# Patient Record
Sex: MALE | Race: WHITE | NOT HISPANIC OR LATINO | Employment: OTHER | URBAN - METROPOLITAN AREA
[De-identification: names, ages, dates, MRNs, and addresses within clinical notes are randomized per-mention and may not be internally consistent; named-entity substitution may affect disease eponyms.]

---

## 2023-02-20 LAB
ANION GAP IN SER/PLAS: 14 MMOL/L (ref 10–20)
BASOPHILS (10*3/UL) IN BLOOD BY AUTOMATED COUNT: 0.04 X10E9/L (ref 0–0.1)
BASOPHILS/100 LEUKOCYTES IN BLOOD BY AUTOMATED COUNT: 0.4 % (ref 0–2)
CALCIUM (MG/DL) IN SER/PLAS: 9.5 MG/DL (ref 8.6–10.3)
CARBON DIOXIDE, TOTAL (MMOL/L) IN SER/PLAS: 29 MMOL/L (ref 21–32)
CHLORIDE (MMOL/L) IN SER/PLAS: 104 MMOL/L (ref 98–107)
CREATININE (MG/DL) IN SER/PLAS: 1.17 MG/DL (ref 0.5–1.3)
EOSINOPHILS (10*3/UL) IN BLOOD BY AUTOMATED COUNT: 0.11 X10E9/L (ref 0–0.7)
EOSINOPHILS/100 LEUKOCYTES IN BLOOD BY AUTOMATED COUNT: 1.2 % (ref 0–6)
ERYTHROCYTE DISTRIBUTION WIDTH (RATIO) BY AUTOMATED COUNT: 12.5 % (ref 11.5–14.5)
ERYTHROCYTE MEAN CORPUSCULAR HEMOGLOBIN CONCENTRATION (G/DL) BY AUTOMATED: 32.6 G/DL (ref 32–36)
ERYTHROCYTE MEAN CORPUSCULAR VOLUME (FL) BY AUTOMATED COUNT: 98 FL (ref 80–100)
ERYTHROCYTES (10*6/UL) IN BLOOD BY AUTOMATED COUNT: 5.91 X10E12/L (ref 4.5–5.9)
GFR MALE: 68 ML/MIN/1.73M2
GLUCOSE (MG/DL) IN SER/PLAS: 81 MG/DL (ref 74–99)
HEMATOCRIT (%) IN BLOOD BY AUTOMATED COUNT: 57.9 % (ref 41–52)
HEMOGLOBIN (G/DL) IN BLOOD: 18.9 G/DL (ref 13.5–17.5)
IMMATURE GRANULOCYTES/100 LEUKOCYTES IN BLOOD BY AUTOMATED COUNT: 0.4 % (ref 0–0.9)
LEUKOCYTES (10*3/UL) IN BLOOD BY AUTOMATED COUNT: 9 X10E9/L (ref 4.4–11.3)
LYMPHOCYTES (10*3/UL) IN BLOOD BY AUTOMATED COUNT: 1.59 X10E9/L (ref 1.2–4.8)
LYMPHOCYTES/100 LEUKOCYTES IN BLOOD BY AUTOMATED COUNT: 17.7 % (ref 13–44)
MONOCYTES (10*3/UL) IN BLOOD BY AUTOMATED COUNT: 0.82 X10E9/L (ref 0.1–1)
MONOCYTES/100 LEUKOCYTES IN BLOOD BY AUTOMATED COUNT: 9.1 % (ref 2–10)
NEUTROPHILS (10*3/UL) IN BLOOD BY AUTOMATED COUNT: 6.39 X10E9/L (ref 1.2–7.7)
NEUTROPHILS/100 LEUKOCYTES IN BLOOD BY AUTOMATED COUNT: 71.2 % (ref 40–80)
PLATELETS (10*3/UL) IN BLOOD AUTOMATED COUNT: 210 X10E9/L (ref 150–450)
POTASSIUM (MMOL/L) IN SER/PLAS: 5 MMOL/L (ref 3.5–5.3)
SODIUM (MMOL/L) IN SER/PLAS: 142 MMOL/L (ref 136–145)
UREA NITROGEN (MG/DL) IN SER/PLAS: 21 MG/DL (ref 6–23)

## 2023-02-21 LAB — URINE CULTURE: NO GROWTH

## 2023-04-05 DIAGNOSIS — N52.8 OTHER MALE ERECTILE DYSFUNCTION: Primary | ICD-10-CM

## 2023-04-05 PROBLEM — G62.9 PERIPHERAL NEUROPATHY: Status: ACTIVE | Noted: 2023-04-05

## 2023-04-05 PROBLEM — R39.89 SUSPECTED UTI: Status: RESOLVED | Noted: 2023-04-05 | Resolved: 2023-04-05

## 2023-04-05 PROBLEM — J44.9 COPD (CHRONIC OBSTRUCTIVE PULMONARY DISEASE) (MULTI): Status: ACTIVE | Noted: 2023-04-05

## 2023-04-05 PROBLEM — D23.62: Status: RESOLVED | Noted: 2023-04-05 | Resolved: 2023-04-05

## 2023-04-05 PROBLEM — H60.502 ACUTE OTITIS EXTERNA OF LEFT EAR: Status: RESOLVED | Noted: 2023-04-05 | Resolved: 2023-04-05

## 2023-04-05 PROBLEM — E78.5 HYPERLIPIDEMIA: Status: ACTIVE | Noted: 2023-04-05

## 2023-04-05 PROBLEM — N45.1 EPIDIDYMITIS: Status: ACTIVE | Noted: 2023-04-05

## 2023-04-05 PROBLEM — R90.89 ABNORMAL FINDING ON MRI OF BRAIN: Status: ACTIVE | Noted: 2023-04-05

## 2023-04-05 PROBLEM — R26.2 DIFFICULTY WALKING: Status: ACTIVE | Noted: 2023-04-05

## 2023-04-05 PROBLEM — M17.10 PRIMARY LOCALIZED OSTEOARTHROSIS, LOWER LEG: Status: ACTIVE | Noted: 2023-04-05

## 2023-04-05 PROBLEM — R73.01 FASTING HYPERGLYCEMIA: Status: RESOLVED | Noted: 2023-04-05 | Resolved: 2023-04-05

## 2023-04-05 PROBLEM — H52.4 HYPEROPIA OF BOTH EYES WITH ASTIGMATISM AND PRESBYOPIA: Status: ACTIVE | Noted: 2023-04-05

## 2023-04-05 PROBLEM — N20.0 CALCULUS OF KIDNEY: Status: ACTIVE | Noted: 2023-04-05

## 2023-04-05 PROBLEM — N32.89 BLADDER MASS: Status: ACTIVE | Noted: 2023-04-05

## 2023-04-05 PROBLEM — H52.4 HYPEROPIA WITH ASTIGMATISM AND PRESBYOPIA: Status: ACTIVE | Noted: 2023-04-05

## 2023-04-05 PROBLEM — N52.9 ERECTILE DYSFUNCTION: Status: ACTIVE | Noted: 2023-04-05

## 2023-04-05 PROBLEM — H90.8 HEARING LOSS, MIXED, UNILATERAL: Status: ACTIVE | Noted: 2023-04-05

## 2023-04-05 PROBLEM — E66.9 OBESITY: Status: ACTIVE | Noted: 2023-04-05

## 2023-04-05 PROBLEM — M25.551 HIP PAIN, RIGHT: Status: RESOLVED | Noted: 2023-04-05 | Resolved: 2023-04-05

## 2023-04-05 PROBLEM — H52.203 HYPEROPIA OF BOTH EYES WITH ASTIGMATISM AND PRESBYOPIA: Status: ACTIVE | Noted: 2023-04-05

## 2023-04-05 PROBLEM — Z96.0 STATUS POST CYSTOSCOPY WITH URETERAL STENT PLACEMENT: Status: ACTIVE | Noted: 2023-04-05

## 2023-04-05 PROBLEM — R05.9 COUGH: Status: RESOLVED | Noted: 2023-04-05 | Resolved: 2023-04-05

## 2023-04-05 PROBLEM — E78.6 LOW HDL (UNDER 40): Status: ACTIVE | Noted: 2023-04-05

## 2023-04-05 PROBLEM — R06.00 DYSPNEA: Status: ACTIVE | Noted: 2023-04-05

## 2023-04-05 PROBLEM — I10 BENIGN ESSENTIAL HYPERTENSION: Status: ACTIVE | Noted: 2023-04-05

## 2023-04-05 PROBLEM — R20.2 PARESTHESIA: Status: ACTIVE | Noted: 2023-04-05

## 2023-04-05 PROBLEM — D75.1 ERYTHROCYTOSIS: Status: ACTIVE | Noted: 2023-04-05

## 2023-04-05 PROBLEM — G44.009 CLUSTER HEADACHE: Status: RESOLVED | Noted: 2023-04-05 | Resolved: 2023-04-05

## 2023-04-05 PROBLEM — M54.12 CERVICAL RADICULITIS: Status: ACTIVE | Noted: 2023-04-05

## 2023-04-05 PROBLEM — H11.121 CONJUNCTIVAL CONCRETIONS OF RIGHT EYE: Status: ACTIVE | Noted: 2023-04-05

## 2023-04-05 PROBLEM — C67.9 BLADDER CANCER (MULTI): Status: ACTIVE | Noted: 2023-04-05

## 2023-04-05 PROBLEM — M25.661 STIFFNESS OF RIGHT KNEE, NOT ELSEWHERE CLASSIFIED: Status: RESOLVED | Noted: 2023-04-05 | Resolved: 2023-04-05

## 2023-04-05 PROBLEM — E11.9 DIABETES MELLITUS TYPE 2 WITHOUT RETINOPATHY (MULTI): Status: ACTIVE | Noted: 2023-04-05

## 2023-04-05 PROBLEM — H52.03 HYPEROPIA OF BOTH EYES WITH ASTIGMATISM AND PRESBYOPIA: Status: ACTIVE | Noted: 2023-04-05

## 2023-04-05 PROBLEM — R22.32 MASS OF FINGER OF LEFT HAND: Status: ACTIVE | Noted: 2023-04-05

## 2023-04-05 PROBLEM — H52.209 HYPEROPIA WITH ASTIGMATISM AND PRESBYOPIA: Status: ACTIVE | Noted: 2023-04-05

## 2023-04-05 PROBLEM — H35.413 LATTICE DEGENERATION OF BOTH RETINAS: Status: RESOLVED | Noted: 2023-04-05 | Resolved: 2023-04-05

## 2023-04-05 PROBLEM — E11.9 DIABETES MELLITUS (MULTI): Status: RESOLVED | Noted: 2023-04-05 | Resolved: 2023-04-05

## 2023-04-05 PROBLEM — H52.00 HYPEROPIA WITH ASTIGMATISM AND PRESBYOPIA: Status: ACTIVE | Noted: 2023-04-05

## 2023-04-05 PROBLEM — R53.83 FATIGUE: Status: ACTIVE | Noted: 2023-04-05

## 2023-04-05 PROBLEM — H66.90 ACUTE OTITIS MEDIA: Status: RESOLVED | Noted: 2023-04-05 | Resolved: 2023-04-05

## 2023-04-05 PROBLEM — C34.90 MALIGNANT NEOPLASM OF LUNG (MULTI): Status: ACTIVE | Noted: 2023-04-05

## 2023-04-05 PROBLEM — J20.9 ACUTE BRONCHITIS WITH BRONCHOSPASM: Status: RESOLVED | Noted: 2023-04-05 | Resolved: 2023-04-05

## 2023-04-05 PROBLEM — Z85.118 HISTORY OF LUNG CANCER: Status: ACTIVE | Noted: 2023-04-05

## 2023-04-05 PROBLEM — H65.90 SEROUS OTITIS MEDIA: Status: RESOLVED | Noted: 2023-04-05 | Resolved: 2023-04-05

## 2023-04-05 PROBLEM — E55.9 VITAMIN D DEFICIENCY: Status: ACTIVE | Noted: 2023-04-05

## 2023-04-05 PROBLEM — D22.9 MULTIPLE NEVI: Status: ACTIVE | Noted: 2023-04-05

## 2023-04-05 PROBLEM — H69.93 DYSFUNCTION OF BOTH EUSTACHIAN TUBES: Status: ACTIVE | Noted: 2023-04-05

## 2023-04-05 PROBLEM — H25.13 NUCLEAR SCLEROSIS OF BOTH EYES: Status: ACTIVE | Noted: 2023-04-05

## 2023-04-05 RX ORDER — LISINOPRIL 10 MG/1
1 TABLET ORAL DAILY
COMMUNITY
Start: 2022-05-12

## 2023-04-05 RX ORDER — ERGOCALCIFEROL 1.25 MG/1
1.25 CAPSULE ORAL
COMMUNITY
Start: 2020-05-14 | End: 2023-06-19

## 2023-04-05 RX ORDER — MELOXICAM 15 MG/1
15 TABLET ORAL DAILY PRN
COMMUNITY
End: 2023-06-07

## 2023-04-05 RX ORDER — DOCUSATE SODIUM 100 MG/1
100 CAPSULE, LIQUID FILLED ORAL 2 TIMES DAILY
COMMUNITY
Start: 2022-05-23

## 2023-04-05 RX ORDER — METFORMIN HYDROCHLORIDE 500 MG/1
2 TABLET, EXTENDED RELEASE ORAL 2 TIMES DAILY
COMMUNITY
Start: 2020-06-16 | End: 2023-04-13

## 2023-04-05 RX ORDER — TADALAFIL 20 MG/1
20 TABLET ORAL AS NEEDED
COMMUNITY
Start: 2019-12-05 | End: 2023-08-25

## 2023-04-05 RX ORDER — GLYBURIDE 5 MG/1
1 TABLET ORAL DAILY
COMMUNITY
Start: 2020-05-14

## 2023-04-05 RX ORDER — TADALAFIL 20 MG/1
TABLET ORAL
Qty: 20 TABLET | Refills: 1 | Status: SHIPPED | OUTPATIENT
Start: 2023-04-05 | End: 2023-06-08

## 2023-04-05 RX ORDER — ALBUTEROL SULFATE 90 UG/1
2 AEROSOL, METERED RESPIRATORY (INHALATION)
COMMUNITY
End: 2023-11-16

## 2023-04-05 RX ORDER — BACILLUS CALMETTE-GUERIN 50 MG/50ML
50 POWDER, FOR SUSPENSION INTRAVESICAL
COMMUNITY
Start: 2022-08-23 | End: 2023-10-19 | Stop reason: ALTCHOICE

## 2023-04-05 RX ORDER — TRAMADOL HYDROCHLORIDE 50 MG/1
50 TABLET ORAL
COMMUNITY
Start: 2022-05-23

## 2023-04-05 RX ORDER — LOSARTAN POTASSIUM 25 MG/1
1 TABLET ORAL DAILY
COMMUNITY
Start: 2022-05-25 | End: 2023-04-11

## 2023-04-05 RX ORDER — PIOGLITAZONEHYDROCHLORIDE 30 MG/1
1 TABLET ORAL DAILY
COMMUNITY
Start: 2020-05-14 | End: 2023-04-13

## 2023-04-11 DIAGNOSIS — I10 ESSENTIAL (PRIMARY) HYPERTENSION: ICD-10-CM

## 2023-04-11 RX ORDER — LOSARTAN POTASSIUM 25 MG/1
TABLET ORAL
Qty: 100 TABLET | Refills: 2 | Status: SHIPPED | OUTPATIENT
Start: 2023-04-11 | End: 2024-02-08

## 2023-04-13 DIAGNOSIS — E11.9 TYPE 2 DIABETES MELLITUS WITHOUT COMPLICATIONS (MULTI): Primary | ICD-10-CM

## 2023-04-13 RX ORDER — METFORMIN HYDROCHLORIDE 500 MG/1
TABLET, EXTENDED RELEASE ORAL
Qty: 360 TABLET | Refills: 3 | Status: SHIPPED | OUTPATIENT
Start: 2023-04-13 | End: 2024-05-02

## 2023-04-13 RX ORDER — PIOGLITAZONEHYDROCHLORIDE 30 MG/1
TABLET ORAL
Qty: 90 TABLET | Refills: 3 | Status: SHIPPED | OUTPATIENT
Start: 2023-04-13 | End: 2023-07-19

## 2023-05-29 DIAGNOSIS — M17.12 UNILATERAL PRIMARY OSTEOARTHRITIS, LEFT KNEE: ICD-10-CM

## 2023-05-30 DIAGNOSIS — N52.8 OTHER MALE ERECTILE DYSFUNCTION: ICD-10-CM

## 2023-06-07 RX ORDER — MELOXICAM 15 MG/1
TABLET ORAL
Qty: 30 TABLET | Refills: 3 | Status: SHIPPED | OUTPATIENT
Start: 2023-06-07 | End: 2023-10-03

## 2023-06-08 RX ORDER — TADALAFIL 20 MG/1
TABLET ORAL
Qty: 20 TABLET | Refills: 1 | Status: SHIPPED | OUTPATIENT
Start: 2023-06-08 | End: 2023-06-22 | Stop reason: SDUPTHER

## 2023-06-17 DIAGNOSIS — E55.9 VITAMIN D DEFICIENCY, UNSPECIFIED: ICD-10-CM

## 2023-06-19 RX ORDER — ERGOCALCIFEROL 1.25 MG/1
CAPSULE ORAL
Qty: 12 CAPSULE | Refills: 2 | Status: SHIPPED | OUTPATIENT
Start: 2023-06-19 | End: 2024-02-05

## 2023-06-22 DIAGNOSIS — N52.8 OTHER MALE ERECTILE DYSFUNCTION: ICD-10-CM

## 2023-06-22 RX ORDER — TADALAFIL 20 MG/1
TABLET ORAL
Qty: 20 TABLET | Refills: 1 | Status: SHIPPED | OUTPATIENT
Start: 2023-06-22 | End: 2023-07-19 | Stop reason: SDUPTHER

## 2023-06-26 ENCOUNTER — HOSPITAL ENCOUNTER (OUTPATIENT)
Dept: DATA CONVERSION | Facility: HOSPITAL | Age: 68
End: 2023-06-26
Attending: ANESTHESIOLOGY
Payer: MEDICARE

## 2023-06-26 DIAGNOSIS — M54.50 LOW BACK PAIN, UNSPECIFIED: ICD-10-CM

## 2023-06-26 DIAGNOSIS — M54.16 RADICULOPATHY, LUMBAR REGION: ICD-10-CM

## 2023-07-07 LAB
ALBUMIN (G/DL) IN SER/PLAS: 4.2 G/DL (ref 3.4–5)
ANION GAP IN SER/PLAS: 9 MMOL/L (ref 10–20)
APPEARANCE, URINE: ABNORMAL
BILIRUBIN, URINE: NEGATIVE
BLOOD, URINE: NEGATIVE
CALCIUM (MG/DL) IN SER/PLAS: 9.7 MG/DL (ref 8.6–10.6)
CARBON DIOXIDE, TOTAL (MMOL/L) IN SER/PLAS: 32 MMOL/L (ref 21–32)
CHLORIDE (MMOL/L) IN SER/PLAS: 107 MMOL/L (ref 98–107)
COLOR, URINE: ABNORMAL
CREATININE (MG/DL) IN SER/PLAS: 1.16 MG/DL (ref 0.5–1.3)
ERYTHROCYTE DISTRIBUTION WIDTH (RATIO) BY AUTOMATED COUNT: 12.3 % (ref 11.5–14.5)
ERYTHROCYTE MEAN CORPUSCULAR HEMOGLOBIN CONCENTRATION (G/DL) BY AUTOMATED: 31.4 G/DL (ref 32–36)
ERYTHROCYTE MEAN CORPUSCULAR VOLUME (FL) BY AUTOMATED COUNT: 102 FL (ref 80–100)
ERYTHROCYTES (10*6/UL) IN BLOOD BY AUTOMATED COUNT: 5.86 X10E12/L (ref 4.5–5.9)
GFR MALE: 68 ML/MIN/1.73M2
GLUCOSE (MG/DL) IN SER/PLAS: 110 MG/DL (ref 74–99)
GLUCOSE, URINE: NEGATIVE MG/DL
HEMATOCRIT (%) IN BLOOD BY AUTOMATED COUNT: 59.6 % (ref 41–52)
HEMOGLOBIN (G/DL) IN BLOOD: 18.7 G/DL (ref 13.5–17.5)
KETONES, URINE: NEGATIVE MG/DL
LEUKOCYTE ESTERASE, URINE: NEGATIVE
LEUKOCYTES (10*3/UL) IN BLOOD BY AUTOMATED COUNT: 6.8 X10E9/L (ref 4.4–11.3)
MUCUS, URINE: NORMAL /LPF
NITRITE, URINE: NEGATIVE
NRBC (PER 100 WBCS) BY AUTOMATED COUNT: 0 /100 WBC (ref 0–0)
PH, URINE: 5 (ref 5–8)
PHOSPHATE (MG/DL) IN SER/PLAS: 3.9 MG/DL (ref 2.5–4.9)
PLATELETS (10*3/UL) IN BLOOD AUTOMATED COUNT: 211 X10E9/L (ref 150–450)
POTASSIUM (MMOL/L) IN SER/PLAS: 5.2 MMOL/L (ref 3.5–5.3)
PROTEIN, URINE: ABNORMAL MG/DL
RBC, URINE: NORMAL /HPF (ref 0–5)
SODIUM (MMOL/L) IN SER/PLAS: 143 MMOL/L (ref 136–145)
SPECIFIC GRAVITY, URINE: 1.02 (ref 1–1.03)
UREA NITROGEN (MG/DL) IN SER/PLAS: 26 MG/DL (ref 6–23)
UROBILINOGEN, URINE: <2 MG/DL (ref 0–1.9)
WBC, URINE: NORMAL /HPF (ref 0–5)

## 2023-07-08 LAB — URINE CULTURE: NO GROWTH

## 2023-07-12 ENCOUNTER — APPOINTMENT (OUTPATIENT)
Dept: PRIMARY CARE | Facility: CLINIC | Age: 68
End: 2023-07-12
Payer: MEDICARE

## 2023-07-19 ENCOUNTER — OFFICE VISIT (OUTPATIENT)
Dept: PRIMARY CARE | Facility: CLINIC | Age: 68
End: 2023-07-19
Payer: MEDICARE

## 2023-07-19 VITALS
RESPIRATION RATE: 16 BRPM | SYSTOLIC BLOOD PRESSURE: 124 MMHG | HEIGHT: 70 IN | BODY MASS INDEX: 32.93 KG/M2 | WEIGHT: 230 LBS | HEART RATE: 72 BPM | DIASTOLIC BLOOD PRESSURE: 76 MMHG

## 2023-07-19 DIAGNOSIS — Z96.0 STATUS POST CYSTOSCOPY WITH URETERAL STENT PLACEMENT: ICD-10-CM

## 2023-07-19 DIAGNOSIS — E78.5 HYPERLIPIDEMIA, UNSPECIFIED HYPERLIPIDEMIA TYPE: ICD-10-CM

## 2023-07-19 DIAGNOSIS — C34.90 MALIGNANT NEOPLASM OF LUNG, UNSPECIFIED LATERALITY, UNSPECIFIED PART OF LUNG (MULTI): ICD-10-CM

## 2023-07-19 DIAGNOSIS — Z85.118 HISTORY OF LUNG CANCER: ICD-10-CM

## 2023-07-19 DIAGNOSIS — R06.09 DYSPNEA ON EXERTION: ICD-10-CM

## 2023-07-19 DIAGNOSIS — G62.89 OTHER POLYNEUROPATHY: ICD-10-CM

## 2023-07-19 DIAGNOSIS — M48.00 SPINAL STENOSIS, UNSPECIFIED SPINAL REGION: ICD-10-CM

## 2023-07-19 DIAGNOSIS — I10 BENIGN ESSENTIAL HYPERTENSION: ICD-10-CM

## 2023-07-19 DIAGNOSIS — C67.9 MALIGNANT NEOPLASM OF URINARY BLADDER, UNSPECIFIED SITE (MULTI): ICD-10-CM

## 2023-07-19 DIAGNOSIS — E11.9 DIABETES MELLITUS TYPE 2 WITHOUT RETINOPATHY (MULTI): ICD-10-CM

## 2023-07-19 DIAGNOSIS — R97.20 ELEVATED PSA: ICD-10-CM

## 2023-07-19 DIAGNOSIS — Z00.00 HEALTHCARE MAINTENANCE: Primary | ICD-10-CM

## 2023-07-19 DIAGNOSIS — E55.9 VITAMIN D DEFICIENCY: ICD-10-CM

## 2023-07-19 DIAGNOSIS — J42 CHRONIC BRONCHITIS, UNSPECIFIED CHRONIC BRONCHITIS TYPE (MULTI): ICD-10-CM

## 2023-07-19 DIAGNOSIS — E66.09 CLASS 1 OBESITY DUE TO EXCESS CALORIES WITHOUT SERIOUS COMORBIDITY WITH BODY MASS INDEX (BMI) OF 33.0 TO 33.9 IN ADULT: ICD-10-CM

## 2023-07-19 PROCEDURE — 1125F AMNT PAIN NOTED PAIN PRSNT: CPT | Performed by: INTERNAL MEDICINE

## 2023-07-19 PROCEDURE — 3008F BODY MASS INDEX DOCD: CPT | Performed by: INTERNAL MEDICINE

## 2023-07-19 PROCEDURE — 1036F TOBACCO NON-USER: CPT | Performed by: INTERNAL MEDICINE

## 2023-07-19 PROCEDURE — G0009 ADMIN PNEUMOCOCCAL VACCINE: HCPCS | Performed by: INTERNAL MEDICINE

## 2023-07-19 PROCEDURE — 3078F DIAST BP <80 MM HG: CPT | Performed by: INTERNAL MEDICINE

## 2023-07-19 PROCEDURE — 99214 OFFICE O/P EST MOD 30 MIN: CPT | Performed by: INTERNAL MEDICINE

## 2023-07-19 PROCEDURE — 1159F MED LIST DOCD IN RCRD: CPT | Performed by: INTERNAL MEDICINE

## 2023-07-19 PROCEDURE — 3044F HG A1C LEVEL LT 7.0%: CPT | Performed by: INTERNAL MEDICINE

## 2023-07-19 PROCEDURE — 4010F ACE/ARB THERAPY RXD/TAKEN: CPT | Performed by: INTERNAL MEDICINE

## 2023-07-19 PROCEDURE — 3074F SYST BP LT 130 MM HG: CPT | Performed by: INTERNAL MEDICINE

## 2023-07-19 PROCEDURE — 90677 PCV20 VACCINE IM: CPT | Performed by: INTERNAL MEDICINE

## 2023-07-19 PROCEDURE — G0439 PPPS, SUBSEQ VISIT: HCPCS | Performed by: INTERNAL MEDICINE

## 2023-07-19 PROCEDURE — 99397 PER PM REEVAL EST PAT 65+ YR: CPT | Performed by: INTERNAL MEDICINE

## 2023-07-19 PROCEDURE — 1160F RVW MEDS BY RX/DR IN RCRD: CPT | Performed by: INTERNAL MEDICINE

## 2023-07-19 PROCEDURE — 1170F FXNL STATUS ASSESSED: CPT | Performed by: INTERNAL MEDICINE

## 2023-07-19 RX ORDER — MELOXICAM 15 MG/1
1 TABLET ORAL DAILY PRN
COMMUNITY
Start: 2022-02-25 | End: 2023-10-19 | Stop reason: SDUPTHER

## 2023-07-19 RX ORDER — ATORVASTATIN CALCIUM 20 MG/1
20 TABLET, FILM COATED ORAL DAILY
COMMUNITY
End: 2023-11-15

## 2023-07-19 RX ORDER — INFLUENZA A VIRUS A/VICTORIA/2570/2019 IVR-215 (H1N1) ANTIGEN (FORMALDEHYDE INACTIVATED), INFLUENZA A VIRUS A/DARWIN/9/2021 SAN-010 (H3N2) ANTIGEN (FORMALDEHYDE INACTIVATED), INFLUENZA B VIRUS B/PHUKET/3073/2013 ANTIGEN (FORMALDEHYDE INACTIVATED), AND INFLUENZA B VIRUS B/MICHIGAN/01/2021 ANTIGEN (FORMALDEHYDE INACTIVATED) 60; 60; 60; 60 UG/.7ML; UG/.7ML; UG/.7ML; UG/.7ML
INJECTION, SUSPENSION INTRAMUSCULAR
COMMUNITY
Start: 2022-10-26 | End: 2023-10-19 | Stop reason: ALTCHOICE

## 2023-07-19 RX ORDER — LOSARTAN POTASSIUM 25 MG/1
1 TABLET ORAL DAILY
COMMUNITY
Start: 2022-05-25 | End: 2023-10-19 | Stop reason: SDUPTHER

## 2023-07-19 RX ORDER — UMECLIDINIUM BROMIDE AND VILANTEROL TRIFENATATE 62.5; 25 UG/1; UG/1
1 POWDER RESPIRATORY (INHALATION) DAILY
COMMUNITY
End: 2024-01-25 | Stop reason: WASHOUT

## 2023-07-19 RX ORDER — BNT162B2 ORIGINAL AND OMICRON BA.4/BA.5 .1125; .1125 MG/2.25ML; MG/2.25ML
INJECTION, SUSPENSION INTRAMUSCULAR
COMMUNITY
Start: 2022-10-26 | End: 2023-10-19 | Stop reason: ALTCHOICE

## 2023-07-19 NOTE — PATIENT INSTRUCTIONS
Overall you are in good health for your age and established medical problems  You have no clinical evidence for active heart disease but multiple risk factors  Normal cardiac stress test December 2015  Recommend resting echocardiogram to evaluate heart function given exertional dyspnea  Age and gender appropriate cancer screening (up-to-date noting therapy for prior lung cancer and bladder cancer  Immunizations consider update pneumonia shot, annual flu shot  Diabetes mellitus-check hemoglobin A1c; discontinue pioglitazone due to bladder cancer  Peripheral neuropathy in feet likely secondary to diabetes, idiopathic, or lumbar spine disease  Obesity associated lifetime risk diabetes, hypertension, heart disease, certain cancers, obstructive sleep apnea and degenerative arthritic joints  Consider sleep apnea test  Consider medication alternatives for weight loss and diabetes

## 2023-07-20 PROBLEM — N50.89 LUMP IN THE TESTICLE: Status: ACTIVE | Noted: 2023-07-20

## 2023-07-20 PROBLEM — R79.89 LOW TESTOSTERONE: Status: ACTIVE | Noted: 2023-07-20

## 2023-07-20 PROBLEM — R31.29 MICROSCOPIC HEMATURIA: Status: ACTIVE | Noted: 2023-07-20

## 2023-07-20 PROBLEM — Z86.010 PERSONAL HISTORY OF COLONIC POLYPS: Status: ACTIVE | Noted: 2023-07-20

## 2023-07-20 PROBLEM — R39.9 UTI SYMPTOMS: Status: RESOLVED | Noted: 2023-07-20 | Resolved: 2023-07-20

## 2023-07-20 PROBLEM — R39.12 WEAK URINARY STREAM: Status: ACTIVE | Noted: 2023-07-20

## 2023-07-20 PROBLEM — R91.1 LUNG NODULE: Status: ACTIVE | Noted: 2023-07-20

## 2023-07-20 PROBLEM — M48.00 SPINAL STENOSIS: Status: ACTIVE | Noted: 2023-07-20

## 2023-07-20 PROBLEM — Z86.0100 PERSONAL HISTORY OF COLONIC POLYPS: Status: ACTIVE | Noted: 2023-07-20

## 2023-07-20 RX ORDER — IBUPROFEN 200 MG
1 CAPSULE ORAL DAILY
COMMUNITY
Start: 2020-05-14

## 2023-08-11 ENCOUNTER — LAB (OUTPATIENT)
Dept: LAB | Facility: LAB | Age: 68
End: 2023-08-11
Payer: MEDICARE

## 2023-08-11 DIAGNOSIS — E11.9 DIABETES MELLITUS TYPE 2 WITHOUT RETINOPATHY (MULTI): ICD-10-CM

## 2023-08-11 DIAGNOSIS — E78.5 HYPERLIPIDEMIA, UNSPECIFIED HYPERLIPIDEMIA TYPE: ICD-10-CM

## 2023-08-11 DIAGNOSIS — R97.20 ELEVATED PSA: ICD-10-CM

## 2023-08-11 DIAGNOSIS — C67.9 MALIGNANT NEOPLASM OF URINARY BLADDER, UNSPECIFIED SITE (MULTI): ICD-10-CM

## 2023-08-11 DIAGNOSIS — E55.9 VITAMIN D DEFICIENCY: ICD-10-CM

## 2023-08-11 LAB
ALBUMIN (G/DL) IN SER/PLAS: 4.6 G/DL (ref 3.4–5)
ANION GAP IN SER/PLAS: 14 MMOL/L (ref 10–20)
APPEARANCE, URINE: ABNORMAL
BILIRUBIN, URINE: NEGATIVE
BLOOD, URINE: NEGATIVE
CALCIDIOL (25 OH VITAMIN D3) (NG/ML) IN SER/PLAS: 96 NG/ML
CALCIUM (MG/DL) IN SER/PLAS: 9.6 MG/DL (ref 8.6–10.6)
CALCIUM OXALATE CRYSTALS, URINE: ABNORMAL /HPF
CARBON DIOXIDE, TOTAL (MMOL/L) IN SER/PLAS: 30 MMOL/L (ref 21–32)
CHLORIDE (MMOL/L) IN SER/PLAS: 106 MMOL/L (ref 98–107)
CHOLESTEROL (MG/DL) IN SER/PLAS: 130 MG/DL (ref 0–199)
CHOLESTEROL IN HDL (MG/DL) IN SER/PLAS: 47.7 MG/DL
CHOLESTEROL/HDL RATIO: 2.7
COLOR, URINE: YELLOW
CREATININE (MG/DL) IN SER/PLAS: 1.27 MG/DL (ref 0.5–1.3)
ERYTHROCYTE DISTRIBUTION WIDTH (RATIO) BY AUTOMATED COUNT: 12.9 % (ref 11.5–14.5)
ERYTHROCYTE MEAN CORPUSCULAR HEMOGLOBIN CONCENTRATION (G/DL) BY AUTOMATED: 33.1 G/DL (ref 32–36)
ERYTHROCYTE MEAN CORPUSCULAR VOLUME (FL) BY AUTOMATED COUNT: 101 FL (ref 80–100)
ERYTHROCYTES (10*6/UL) IN BLOOD BY AUTOMATED COUNT: 5.7 X10E12/L (ref 4.5–5.9)
ESTIMATED AVERAGE GLUCOSE FOR HBA1C: 123 MG/DL
GFR MALE: 61 ML/MIN/1.73M2
GLUCOSE (MG/DL) IN SER/PLAS: 116 MG/DL (ref 74–99)
GLUCOSE, URINE: NEGATIVE MG/DL
HEMATOCRIT (%) IN BLOOD BY AUTOMATED COUNT: 57.4 % (ref 41–52)
HEMOGLOBIN (G/DL) IN BLOOD: 19 G/DL (ref 13.5–17.5)
HEMOGLOBIN A1C/HEMOGLOBIN TOTAL IN BLOOD: 5.9 %
KETONES, URINE: ABNORMAL MG/DL
LDL: 59 MG/DL (ref 0–99)
LEUKOCYTE ESTERASE, URINE: NEGATIVE
LEUKOCYTES (10*3/UL) IN BLOOD BY AUTOMATED COUNT: 6.9 X10E9/L (ref 4.4–11.3)
MUCUS, URINE: ABNORMAL /LPF
NITRITE, URINE: NEGATIVE
NRBC (PER 100 WBCS) BY AUTOMATED COUNT: 0 /100 WBC (ref 0–0)
PH, URINE: 5 (ref 5–8)
PHOSPHATE (MG/DL) IN SER/PLAS: 4.7 MG/DL (ref 2.5–4.9)
PLATELETS (10*3/UL) IN BLOOD AUTOMATED COUNT: 204 X10E9/L (ref 150–450)
POTASSIUM (MMOL/L) IN SER/PLAS: 5.5 MMOL/L (ref 3.5–5.3)
PROSTATE SPECIFIC AG (NG/ML) IN SER/PLAS: 3.64 NG/ML (ref 0–4)
PROTEIN, URINE: ABNORMAL MG/DL
RBC, URINE: 2 /HPF (ref 0–5)
SODIUM (MMOL/L) IN SER/PLAS: 144 MMOL/L (ref 136–145)
SPECIFIC GRAVITY, URINE: 1.02 (ref 1–1.03)
TRIGLYCERIDE (MG/DL) IN SER/PLAS: 116 MG/DL (ref 0–149)
UREA NITROGEN (MG/DL) IN SER/PLAS: 18 MG/DL (ref 6–23)
UROBILINOGEN, URINE: <2 MG/DL (ref 0–1.9)
VLDL: 23 MG/DL (ref 0–40)
WBC, URINE: 3 /HPF (ref 0–5)

## 2023-08-11 PROCEDURE — 83036 HEMOGLOBIN GLYCOSYLATED A1C: CPT

## 2023-08-11 PROCEDURE — 82306 VITAMIN D 25 HYDROXY: CPT

## 2023-08-11 PROCEDURE — 80061 LIPID PANEL: CPT

## 2023-08-11 PROCEDURE — 36415 COLL VENOUS BLD VENIPUNCTURE: CPT

## 2023-08-11 PROCEDURE — 84153 ASSAY OF PSA TOTAL: CPT

## 2023-08-12 LAB — URINE CULTURE: NORMAL

## 2023-08-24 DIAGNOSIS — N52.9 ERECTILE DYSFUNCTION, UNSPECIFIED ERECTILE DYSFUNCTION TYPE: ICD-10-CM

## 2023-08-25 RX ORDER — TADALAFIL 20 MG/1
20 TABLET ORAL AS NEEDED
Qty: 20 TABLET | Refills: 2 | Status: SHIPPED | OUTPATIENT
Start: 2023-08-25 | End: 2023-12-08

## 2023-09-02 PROBLEM — E66.811 CLASS 1 OBESITY WITH BODY MASS INDEX (BMI) OF 30.0 TO 30.9 IN ADULT: Status: ACTIVE | Noted: 2023-09-02

## 2023-09-02 PROBLEM — R07.89 CHEST DISCOMFORT: Status: ACTIVE | Noted: 2023-09-02

## 2023-09-02 PROBLEM — E66.9 CLASS 1 OBESITY WITH BODY MASS INDEX (BMI) OF 30.0 TO 30.9 IN ADULT: Status: ACTIVE | Noted: 2023-09-02

## 2023-09-02 RX ORDER — TADALAFIL 5 MG/1
1 TABLET ORAL DAILY
COMMUNITY
End: 2023-10-19 | Stop reason: ALTCHOICE

## 2023-09-02 RX ORDER — PIOGLITAZONEHYDROCHLORIDE 30 MG/1
1 TABLET ORAL DAILY
COMMUNITY
Start: 2020-05-14

## 2023-09-02 RX ORDER — MULTIVITAMIN
1 TABLET ORAL DAILY
COMMUNITY

## 2023-09-02 RX ORDER — TIOTROPIUM BROMIDE INHALATION SPRAY 1.56 UG/1
2 SPRAY, METERED RESPIRATORY (INHALATION) DAILY
COMMUNITY
Start: 2021-04-14 | End: 2024-01-25 | Stop reason: WASHOUT

## 2023-09-02 RX ORDER — LANCETS
EACH MISCELLANEOUS DAILY
COMMUNITY

## 2023-09-03 PROBLEM — Z00.00 HEALTHCARE MAINTENANCE: Status: ACTIVE | Noted: 2023-09-03

## 2023-09-03 ASSESSMENT — ENCOUNTER SYMPTOMS
POLYDIPSIA: 0
DYSPHORIC MOOD: 0
ARTHRALGIAS: 1
DYSURIA: 0
SLEEP DISTURBANCE: 0
EYES NEGATIVE: 1
TREMORS: 0
SHORTNESS OF BREATH: 0
FATIGUE: 0
POLYPHAGIA: 0
HEMATURIA: 0
TROUBLE SWALLOWING: 0
OCCASIONAL FEELINGS OF UNSTEADINESS: 0
FREQUENCY: 0
NAUSEA: 0
ENDOCRINE NEGATIVE: 1
SORE THROAT: 0
LOSS OF SENSATION IN FEET: 1
COUGH: 0
LIGHT-HEADEDNESS: 0
APNEA: 0
CONSTIPATION: 0
BACK PAIN: 1
VOICE CHANGE: 0
HEADACHES: 0
NERVOUS/ANXIOUS: 0
DECREASED CONCENTRATION: 0
SPEECH DIFFICULTY: 0
WOUND: 0
WEAKNESS: 0
NUMBNESS: 0
CONFUSION: 0
FEVER: 0
BRUISES/BLEEDS EASILY: 0
PALPITATIONS: 0
DIZZINESS: 0
DIARRHEA: 0
ABDOMINAL PAIN: 0
RESPIRATORY NEGATIVE: 1
VOMITING: 0
UNEXPECTED WEIGHT CHANGE: 0

## 2023-09-03 ASSESSMENT — ACTIVITIES OF DAILY LIVING (ADL)
GROCERY_SHOPPING: INDEPENDENT
DRESSING: INDEPENDENT
TAKING_MEDICATION: INDEPENDENT
MANAGING_FINANCES: INDEPENDENT
BATHING: INDEPENDENT
DOING_HOUSEWORK: INDEPENDENT

## 2023-09-03 ASSESSMENT — PATIENT HEALTH QUESTIONNAIRE - PHQ9
SUM OF ALL RESPONSES TO PHQ9 QUESTIONS 1 AND 2: 0
1. LITTLE INTEREST OR PLEASURE IN DOING THINGS: NOT AT ALL
2. FEELING DOWN, DEPRESSED OR HOPELESS: NOT AT ALL

## 2023-09-03 NOTE — PROGRESS NOTES
"Subjective   Reason for Visit: Ravin Camacho is an 68 y.o. male here for a Medicare Wellness visit.     Past Medical, Surgical, and Family History reviewed and updated in chart.         HPI  Overall no acute complaints    Patient Care Team:  Jose Antonio Prado MD as PCP - General  Jose Antonio Prado MD as PCP - [employer] PCP     Review of Systems   Constitutional:  Negative for fatigue, fever and unexpected weight change.   HENT:  Positive for hearing loss. Negative for dental problem, sore throat, tinnitus, trouble swallowing and voice change.         Hearing aids, regular dental care   Eyes: Negative.  Negative for visual disturbance.        Exam 1 month, eyeglasses   Respiratory: Negative.  Negative for apnea, cough and shortness of breath.         Short of breath 1 flight of stairs   Cardiovascular:  Negative for chest pain, palpitations and leg swelling.   Gastrointestinal:  Negative for abdominal pain, constipation, diarrhea, nausea and vomiting.   Endocrine: Negative.  Negative for polydipsia, polyphagia and polyuria.   Genitourinary: Negative.  Negative for dysuria, frequency, hematuria and urgency.        Tadalafil okay   Musculoskeletal:  Positive for arthralgias and back pain. Negative for gait problem.        Knee pain, back pain, right hip pain, had right knee cortisone injection   Skin: Negative.  Negative for rash and wound.        Dermatology consult over 1 year   Allergic/Immunologic: Negative for immunocompromised state.   Neurological:  Negative for dizziness, tremors, speech difficulty, weakness, light-headedness, numbness and headaches.        Cluster headaches resolved   Hematological:  Does not bruise/bleed easily.   Psychiatric/Behavioral:  Negative for confusion, decreased concentration, dysphoric mood and sleep disturbance. The patient is not nervous/anxious.        Objective   Vitals:  /76   Pulse 72   Resp 16   Ht 1.778 m (5' 10\")   Wt 104 kg (230 lb)   BMI 33.00 " kg/m²       Physical Exam  Constitutional:       General: He is not in acute distress.     Appearance: Normal appearance. He is obese.   HENT:      Head: Normocephalic.      Right Ear: Hearing and tympanic membrane normal.      Left Ear: Hearing and tympanic membrane normal.      Ears:      Comments: Hearing aids speech and finger rub     Nose: Nose normal.      Mouth/Throat:      Mouth: Mucous membranes are moist.      Pharynx: Oropharynx is clear.      Comments: Lower dentures  Eyes:      General: No scleral icterus.     Extraocular Movements: Extraocular movements intact.      Conjunctiva/sclera: Conjunctivae normal.   Neck:      Thyroid: No thyromegaly.      Vascular: No carotid bruit or JVD.   Cardiovascular:      Rate and Rhythm: Normal rate and regular rhythm.      Pulses: Normal pulses.      Heart sounds: Normal heart sounds. No murmur heard.  Pulmonary:      Effort: Pulmonary effort is normal.      Breath sounds: Normal breath sounds. No wheezing, rhonchi or rales.   Chest:      Comments: Right posterior thoracic surgical scar  Abdominal:      General: Abdomen is flat. Bowel sounds are normal. There is no distension.      Palpations: Abdomen is soft. There is no mass.      Tenderness: There is no abdominal tenderness. There is no right CVA tenderness, left CVA tenderness or guarding.      Hernia: No hernia is present.   Genitourinary:     Comments: /rectal not examined  Musculoskeletal:         General: Normal range of motion.      Cervical back: Full passive range of motion without pain. No tenderness.      Right lower leg: No edema.      Left lower leg: No edema.      Comments: Joints F ROM, T0 L0 S0  surgical scar right knee  Decreased range of motion lumbar spine and right hip   Lymphadenopathy:      Cervical: No cervical adenopathy.   Skin:     General: Skin is warm.      Capillary Refill: Capillary refill takes less than 2 seconds.      Findings: No lesion or rash.      Comments: Multiple nevi    Neurological:      General: No focal deficit present.      Mental Status: He is alert and oriented to person, place, and time.      Cranial Nerves: No cranial nerve deficit.      Sensory: Sensory deficit present.      Motor: No weakness.      Coordination: Coordination normal.      Gait: Gait normal.      Deep Tendon Reflexes: Reflexes normal.      Comments: Decreased vibration and filament sense plantar feet   Psychiatric:         Mood and Affect: Mood normal.         Behavior: Behavior normal.         Thought Content: Thought content normal.       Data  ECG 2/20 normal sinus rhythm  Ultrasound kidneys 7/17/2023  CT chest 6/5/2023  PET/CT 6/21/2023  X-ray hip 7/20/2022-mild DJD  Lab 7/7 hemoglobin/hematocrit 18.7/60  9/6/2022 hemoglobin A1c 5.7%  Coronary artery calcium CT score 2016  Cardiac stress test December 2015  Colonoscopy 2019  Urology consult/7/11  Pulmonary consult 7/6  Radiation oncology consult 6/22  Pain management consult 6/26  Ophthalmology consult 4/12    Assessment/Plan     Overall doing well considering recent medical problems  No clinical evidence of acute heart disease however check echocardiogram for left ventricular function given dyspnea on exertion  Cardiac risk counseling provided  Age and gender appropriate cancer screening prevention reviewed, emphasis on prior lung cancer and urologic cancer  Colonoscopy 2019  Immunizations reviewed, administer Prevnar 20  DJD the knee, spine with lumbar spinal stenosis  Diabetes satisfactory, check hemoglobin A1c  Peripheral neuropathy likely secondary to diabetes and/or lumbar spinal stenosis  Discontinue pioglitazone  COPD stable  Obesity consider sleep study    Problem List Items Addressed This Visit       Benign essential hypertension    Bladder cancer (CMS/HCC)    Relevant Orders    Prostate Specific Antigen (Completed)    COPD (chronic obstructive pulmonary disease) (CMS/HCC)    Diabetes mellitus type 2 without retinopathy (CMS/HCC)    Relevant  Orders    Hemoglobin A1C (Completed)    Transthoracic Echo (TTE) Complete    Dyspnea    Relevant Orders    Transthoracic Echo (TTE) Complete    History of lung cancer    Hyperlipidemia    Relevant Orders    Lipid Panel (Completed)    Vitamin D deficiency    Relevant Orders    Vitamin D, Total (Completed)    Status post cystoscopy with ureteral stent placement    Peripheral neuropathy    Obesity    Malignant neoplasm of lung (CMS/HCC)    Spinal stenosis    Healthcare maintenance - Primary     Other Visit Diagnoses       Elevated PSA        Relevant Orders    Prostate Specific Antigen (Completed)

## 2023-09-08 LAB
ALBUMIN (G/DL) IN SER/PLAS: 4.4 G/DL (ref 3.4–5)
ANION GAP IN SER/PLAS: 14 MMOL/L (ref 10–20)
APPEARANCE, URINE: ABNORMAL
BILIRUBIN, URINE: NEGATIVE
BLOOD, URINE: NEGATIVE
CALCIUM (MG/DL) IN SER/PLAS: 9.5 MG/DL (ref 8.6–10.6)
CALCIUM OXALATE CRYSTALS, URINE: NORMAL /HPF
CARBON DIOXIDE, TOTAL (MMOL/L) IN SER/PLAS: 29 MMOL/L (ref 21–32)
CHLORIDE (MMOL/L) IN SER/PLAS: 108 MMOL/L (ref 98–107)
COLOR, URINE: ABNORMAL
CREATININE (MG/DL) IN SER/PLAS: 1.11 MG/DL (ref 0.5–1.3)
ERYTHROCYTE DISTRIBUTION WIDTH (RATIO) BY AUTOMATED COUNT: 12.7 % (ref 11.5–14.5)
ERYTHROCYTE MEAN CORPUSCULAR HEMOGLOBIN CONCENTRATION (G/DL) BY AUTOMATED: 32.1 G/DL (ref 32–36)
ERYTHROCYTE MEAN CORPUSCULAR VOLUME (FL) BY AUTOMATED COUNT: 103 FL (ref 80–100)
ERYTHROCYTES (10*6/UL) IN BLOOD BY AUTOMATED COUNT: 5.56 X10E12/L (ref 4.5–5.9)
GFR MALE: 72 ML/MIN/1.73M2
GLUCOSE (MG/DL) IN SER/PLAS: 76 MG/DL (ref 74–99)
GLUCOSE, URINE: NEGATIVE MG/DL
HEMATOCRIT (%) IN BLOOD BY AUTOMATED COUNT: 57.3 % (ref 41–52)
HEMOGLOBIN (G/DL) IN BLOOD: 18.4 G/DL (ref 13.5–17.5)
KETONES, URINE: ABNORMAL MG/DL
LEUKOCYTE ESTERASE, URINE: NEGATIVE
LEUKOCYTES (10*3/UL) IN BLOOD BY AUTOMATED COUNT: 7.5 X10E9/L (ref 4.4–11.3)
NITRITE, URINE: NEGATIVE
NRBC (PER 100 WBCS) BY AUTOMATED COUNT: 0 /100 WBC (ref 0–0)
PH, URINE: 5 (ref 5–8)
PHOSPHATE (MG/DL) IN SER/PLAS: 3.9 MG/DL (ref 2.5–4.9)
PLATELETS (10*3/UL) IN BLOOD AUTOMATED COUNT: 233 X10E9/L (ref 150–450)
POTASSIUM (MMOL/L) IN SER/PLAS: 5.6 MMOL/L (ref 3.5–5.3)
PROTEIN, URINE: ABNORMAL MG/DL
RBC, URINE: 2 /HPF (ref 0–5)
SODIUM (MMOL/L) IN SER/PLAS: 145 MMOL/L (ref 136–145)
SPECIFIC GRAVITY, URINE: 1.02 (ref 1–1.03)
SQUAMOUS EPITHELIAL CELLS, URINE: <1 /HPF
UREA NITROGEN (MG/DL) IN SER/PLAS: 18 MG/DL (ref 6–23)
UROBILINOGEN, URINE: <2 MG/DL (ref 0–1.9)
WBC, URINE: 1 /HPF (ref 0–5)

## 2023-09-09 LAB — URINE CULTURE: NORMAL

## 2023-10-02 NOTE — OP NOTE
Post Operative Note:     Post-Procedure Diagnosis: Right-sided radiculitis   Procedure: 1.   2.   3.   4.   5.   Surgeon: López   Resident/Fellow/Other Assistant: Carolyne   Estimated Blood Loss (mL): none   Specimen: no   Findings: None     Operative Report Dictated:  Dictation: not applicable - note contains Operative  Report   Operative Report:    Preoperative diagnosis:  Lumbar radiculitis  Postoperative diagnosis:  Lumbar radiculitis  Procedure: Right L4 and L5 Lumbar transforaminal epidural steroid injection under fluoroscopic guidance  Surgeon: Dominique Dawn  Assistant:  Fellow  Anesthesia: Local, IV sedation  Complications: Apparently none    Clinical note: Ravin Camacho is a 68-year-old male with a history of low back and right leg pain who presents today for the aforementioned procedure.    Procedure note: The patient was met in the preoperative holding area after risks benefits and alternatives to procedure were discussed with the patient, informed consent was obtained. Patient brought back to the procedure room and placed in the prone  position on the fluoroscopy table. Area over the back was exposed, prepped, draped, in the usual sterile fashion.  Skin and subcutaneous tissues to the neuroforamen was anesthetized using 0.5% lidocaine.  22-gauge Sprotte needles were inserted in the  skin and advanced into the foramen. Needle tip position was confirmed in AP oblique and lateral view.  Contrast was injected which showed appropriate epidural spread, no intravascular or intrathecal uptake. A total of 2 mL of 0.5% lidocaine mixed with  10 mg dexamethasone was injected in divided doses among the 2 needles. Needles removed, bandage applied, patient tolerated the procedure well with no immediate complications.      Attestation:   Note Completion:  Attending Attestation I was present for the entire procedure         Electronic Signatures:  Dominique Dawn)  (Signed 26-Jun-2023 08:11)   Authored:  Post Operative Note, Note Completion      Last Updated: 26-Jun-2023 08:11 by Dominique Dawn)

## 2023-10-03 DIAGNOSIS — M17.12 UNILATERAL PRIMARY OSTEOARTHRITIS, LEFT KNEE: ICD-10-CM

## 2023-10-03 RX ORDER — MELOXICAM 15 MG/1
TABLET ORAL
Qty: 30 TABLET | Refills: 3 | Status: SHIPPED | OUTPATIENT
Start: 2023-10-03 | End: 2024-01-30

## 2023-10-06 ENCOUNTER — LAB (OUTPATIENT)
Dept: LAB | Facility: LAB | Age: 68
End: 2023-10-06
Payer: MEDICARE

## 2023-10-06 DIAGNOSIS — C67.9 MALIGNANT NEOPLASM OF BLADDER, UNSPECIFIED (MULTI): Primary | ICD-10-CM

## 2023-10-06 DIAGNOSIS — R39.89 OTHER SYMPTOMS AND SIGNS INVOLVING THE GENITOURINARY SYSTEM: ICD-10-CM

## 2023-10-06 LAB
ALBUMIN SERPL BCP-MCNC: 4.7 G/DL (ref 3.4–5)
ANION GAP SERPL CALC-SCNC: 17 MMOL/L (ref 10–20)
BUN SERPL-MCNC: 18 MG/DL (ref 6–23)
CALCIUM SERPL-MCNC: 10.4 MG/DL (ref 8.6–10.6)
CHLORIDE SERPL-SCNC: 104 MMOL/L (ref 98–107)
CO2 SERPL-SCNC: 28 MMOL/L (ref 21–32)
CREAT SERPL-MCNC: 1.14 MG/DL (ref 0.5–1.3)
ERYTHROCYTE [DISTWIDTH] IN BLOOD BY AUTOMATED COUNT: 13.2 % (ref 11.5–14.5)
GFR SERPL CREATININE-BSD FRML MDRD: 70 ML/MIN/1.73M*2
GLUCOSE SERPL-MCNC: 85 MG/DL (ref 74–99)
HCT VFR BLD AUTO: 60.7 % (ref 41–52)
HGB BLD-MCNC: 19.8 G/DL (ref 13.5–17.5)
MCH RBC QN AUTO: 32.7 PG (ref 26–34)
MCHC RBC AUTO-ENTMCNC: 32.6 G/DL (ref 32–36)
MCV RBC AUTO: 100 FL (ref 80–100)
NRBC BLD-RTO: 0 /100 WBCS (ref 0–0)
PHOSPHATE SERPL-MCNC: 4.3 MG/DL (ref 2.5–4.9)
PLATELET # BLD AUTO: 233 X10*3/UL (ref 150–450)
PMV BLD AUTO: 10.1 FL (ref 7.5–11.5)
POTASSIUM SERPL-SCNC: 5.5 MMOL/L (ref 3.5–5.3)
RBC # BLD AUTO: 6.06 X10*6/UL (ref 4.5–5.9)
SODIUM SERPL-SCNC: 143 MMOL/L (ref 136–145)
WBC # BLD AUTO: 7.8 X10*3/UL (ref 4.4–11.3)

## 2023-10-06 PROCEDURE — 85027 COMPLETE CBC AUTOMATED: CPT

## 2023-10-06 PROCEDURE — 87086 URINE CULTURE/COLONY COUNT: CPT

## 2023-10-06 PROCEDURE — 80069 RENAL FUNCTION PANEL: CPT

## 2023-10-06 PROCEDURE — 81001 URINALYSIS AUTO W/SCOPE: CPT

## 2023-10-06 PROCEDURE — 36415 COLL VENOUS BLD VENIPUNCTURE: CPT

## 2023-10-07 LAB
APPEARANCE UR: CLEAR
BILIRUB UR STRIP.AUTO-MCNC: NEGATIVE MG/DL
CAOX CRY #/AREA UR COMP ASSIST: ABNORMAL /HPF
COLOR UR: YELLOW
GLUCOSE UR STRIP.AUTO-MCNC: NEGATIVE MG/DL
HYALINE CASTS #/AREA URNS AUTO: ABNORMAL /LPF
KETONES UR STRIP.AUTO-MCNC: NEGATIVE MG/DL
LEUKOCYTE ESTERASE UR QL STRIP.AUTO: NEGATIVE
MUCOUS THREADS #/AREA URNS AUTO: ABNORMAL /LPF
NITRITE UR QL STRIP.AUTO: NEGATIVE
PH UR STRIP.AUTO: 5 [PH]
PROT UR STRIP.AUTO-MCNC: ABNORMAL MG/DL
RBC # UR STRIP.AUTO: NEGATIVE /UL
RBC #/AREA URNS AUTO: ABNORMAL /HPF
SP GR UR STRIP.AUTO: 1.02
UROBILINOGEN UR STRIP.AUTO-MCNC: <2 MG/DL
WBC #/AREA URNS AUTO: ABNORMAL /HPF

## 2023-10-08 LAB — BACTERIA UR CULT: NO GROWTH

## 2023-10-17 ENCOUNTER — PROCEDURE VISIT (OUTPATIENT)
Dept: UROLOGY | Facility: HOSPITAL | Age: 68
End: 2023-10-17
Payer: MEDICARE

## 2023-10-17 DIAGNOSIS — C67.9 MALIGNANT NEOPLASM OF URINARY BLADDER, UNSPECIFIED SITE (MULTI): Primary | ICD-10-CM

## 2023-10-17 PROCEDURE — 2500000004 HC RX 250 GENERAL PHARMACY W/ HCPCS (ALT 636 FOR OP/ED): Performed by: UROLOGY

## 2023-10-17 PROCEDURE — 51702 INSERT TEMP BLADDER CATH: CPT | Mod: CCI | Performed by: NURSE PRACTITIONER

## 2023-10-17 PROCEDURE — 51720 TREATMENT OF BLADDER LESION: CPT | Performed by: NURSE PRACTITIONER

## 2023-10-17 PROCEDURE — 99214 OFFICE O/P EST MOD 30 MIN: CPT | Performed by: NURSE PRACTITIONER

## 2023-10-17 RX ORDER — ALBUTEROL SULFATE 0.83 MG/ML
3 SOLUTION RESPIRATORY (INHALATION) AS NEEDED
Status: DISCONTINUED | OUTPATIENT
Start: 2023-10-17 | End: 2023-10-17 | Stop reason: HOSPADM

## 2023-10-17 RX ORDER — PROCHLORPERAZINE MALEATE 10 MG
10 TABLET ORAL EVERY 6 HOURS PRN
Status: DISCONTINUED | OUTPATIENT
Start: 2023-10-17 | End: 2023-10-17 | Stop reason: HOSPADM

## 2023-10-17 RX ORDER — FAMOTIDINE 10 MG/ML
20 INJECTION INTRAVENOUS ONCE AS NEEDED
Status: DISCONTINUED | OUTPATIENT
Start: 2023-10-17 | End: 2023-10-17 | Stop reason: HOSPADM

## 2023-10-17 RX ORDER — EPINEPHRINE 0.3 MG/.3ML
0.3 INJECTION SUBCUTANEOUS EVERY 5 MIN PRN
Status: DISCONTINUED | OUTPATIENT
Start: 2023-10-17 | End: 2023-10-17 | Stop reason: HOSPADM

## 2023-10-17 RX ORDER — DIPHENHYDRAMINE HYDROCHLORIDE 50 MG/ML
50 INJECTION INTRAMUSCULAR; INTRAVENOUS AS NEEDED
Status: DISCONTINUED | OUTPATIENT
Start: 2023-10-17 | End: 2023-10-17 | Stop reason: HOSPADM

## 2023-10-17 RX ORDER — PROCHLORPERAZINE EDISYLATE 5 MG/ML
10 INJECTION INTRAMUSCULAR; INTRAVENOUS EVERY 6 HOURS PRN
Status: DISCONTINUED | OUTPATIENT
Start: 2023-10-17 | End: 2023-10-17 | Stop reason: HOSPADM

## 2023-10-17 RX ADMIN — GEMCITABINE 1000 MG: 38 INJECTION, SOLUTION INTRAVENOUS at 14:22

## 2023-10-17 RX ADMIN — DOCETAXEL 37.5 MG: 20 INJECTION, SOLUTION, CONCENTRATE INTRAVENOUS at 15:28

## 2023-10-17 NOTE — PROGRESS NOTES
Patient verified by name and date.  Patient here for Gemcitabine/Docetaxel instillation.  Orders verified by 2 certified RN's per policy at bedside.  Medications given intravesical through weston catheter using sterile technique.  Patient tolerated procedure well.

## 2023-10-19 ENCOUNTER — HOSPITAL ENCOUNTER (OUTPATIENT)
Dept: RESPIRATORY THERAPY | Facility: HOSPITAL | Age: 68
Discharge: HOME | End: 2023-10-19
Payer: MEDICARE

## 2023-10-19 ENCOUNTER — OFFICE VISIT (OUTPATIENT)
Dept: PULMONOLOGY | Facility: HOSPITAL | Age: 68
End: 2023-10-19
Payer: MEDICARE

## 2023-10-19 VITALS
WEIGHT: 242.2 LBS | HEIGHT: 70 IN | HEART RATE: 76 BPM | SYSTOLIC BLOOD PRESSURE: 125 MMHG | OXYGEN SATURATION: 95 % | TEMPERATURE: 97.7 F | RESPIRATION RATE: 18 BRPM | BODY MASS INDEX: 34.67 KG/M2 | DIASTOLIC BLOOD PRESSURE: 86 MMHG

## 2023-10-19 DIAGNOSIS — J42 CHRONIC BRONCHITIS, UNSPECIFIED CHRONIC BRONCHITIS TYPE (MULTI): ICD-10-CM

## 2023-10-19 DIAGNOSIS — J44.9 CHRONIC OBSTRUCTIVE PULMONARY DISEASE, UNSPECIFIED COPD TYPE (MULTI): Primary | ICD-10-CM

## 2023-10-19 DIAGNOSIS — R06.00 DYSPNEA, UNSPECIFIED TYPE: ICD-10-CM

## 2023-10-19 PROCEDURE — 1159F MED LIST DOCD IN RCRD: CPT | Performed by: NURSE PRACTITIONER

## 2023-10-19 PROCEDURE — 94010 BREATHING CAPACITY TEST: CPT

## 2023-10-19 PROCEDURE — 1036F TOBACCO NON-USER: CPT | Performed by: NURSE PRACTITIONER

## 2023-10-19 PROCEDURE — 3008F BODY MASS INDEX DOCD: CPT | Performed by: NURSE PRACTITIONER

## 2023-10-19 PROCEDURE — 1160F RVW MEDS BY RX/DR IN RCRD: CPT | Performed by: NURSE PRACTITIONER

## 2023-10-19 PROCEDURE — 3074F SYST BP LT 130 MM HG: CPT | Performed by: NURSE PRACTITIONER

## 2023-10-19 PROCEDURE — 99215 OFFICE O/P EST HI 40 MIN: CPT | Performed by: NURSE PRACTITIONER

## 2023-10-19 PROCEDURE — 3079F DIAST BP 80-89 MM HG: CPT | Performed by: NURSE PRACTITIONER

## 2023-10-19 PROCEDURE — 3044F HG A1C LEVEL LT 7.0%: CPT | Performed by: NURSE PRACTITIONER

## 2023-10-19 PROCEDURE — 1126F AMNT PAIN NOTED NONE PRSNT: CPT | Performed by: NURSE PRACTITIONER

## 2023-10-19 PROCEDURE — 4010F ACE/ARB THERAPY RXD/TAKEN: CPT | Performed by: NURSE PRACTITIONER

## 2023-10-19 RX ORDER — BUDESONIDE, GLYCOPYRROLATE, AND FORMOTEROL FUMARATE 160; 9; 4.8 UG/1; UG/1; UG/1
2 AEROSOL, METERED RESPIRATORY (INHALATION)
Qty: 10.7 G | Refills: 3 | Status: SHIPPED | OUTPATIENT
Start: 2023-10-19 | End: 2024-01-25 | Stop reason: WASHOUT

## 2023-10-19 RX ORDER — FLUTICASONE FUROATE, UMECLIDINIUM BROMIDE AND VILANTEROL TRIFENATATE 100; 62.5; 25 UG/1; UG/1; UG/1
1 POWDER RESPIRATORY (INHALATION) DAILY
Qty: 1 EACH | Refills: 3 | Status: SHIPPED | OUTPATIENT
Start: 2023-10-19 | End: 2024-02-14

## 2023-10-19 SDOH — ECONOMIC STABILITY: FOOD INSECURITY: WITHIN THE PAST 12 MONTHS, THE FOOD YOU BOUGHT JUST DIDN'T LAST AND YOU DIDN'T HAVE MONEY TO GET MORE.: NEVER TRUE

## 2023-10-19 SDOH — ECONOMIC STABILITY: FOOD INSECURITY: WITHIN THE PAST 12 MONTHS, YOU WORRIED THAT YOUR FOOD WOULD RUN OUT BEFORE YOU GOT MONEY TO BUY MORE.: NEVER TRUE

## 2023-10-19 ASSESSMENT — PAIN SCALES - GENERAL: PAINLEVEL: 0-NO PAIN

## 2023-10-19 ASSESSMENT — COLUMBIA-SUICIDE SEVERITY RATING SCALE - C-SSRS
2. HAVE YOU ACTUALLY HAD ANY THOUGHTS OF KILLING YOURSELF?: NO
6. HAVE YOU EVER DONE ANYTHING, STARTED TO DO ANYTHING, OR PREPARED TO DO ANYTHING TO END YOUR LIFE?: NO
1. IN THE PAST MONTH, HAVE YOU WISHED YOU WERE DEAD OR WISHED YOU COULD GO TO SLEEP AND NOT WAKE UP?: NO

## 2023-10-19 ASSESSMENT — PATIENT HEALTH QUESTIONNAIRE - PHQ9
SUM OF ALL RESPONSES TO PHQ9 QUESTIONS 1 AND 2: 0
2. FEELING DOWN, DEPRESSED OR HOPELESS: NOT AT ALL
1. LITTLE INTEREST OR PLEASURE IN DOING THINGS: NOT AT ALL

## 2023-10-19 ASSESSMENT — ENCOUNTER SYMPTOMS
LOSS OF SENSATION IN FEET: 0
OCCASIONAL FEELINGS OF UNSTEADINESS: 0
DEPRESSION: 0

## 2023-10-19 NOTE — PATIENT INSTRUCTIONS
1. COPD: Previous PFTs with severe, but now improved to moderate obstruction. Dyspnea on exertion much improved. 2019 O2 desat - 87 %on RA.   - stop Anoro 1 puff daily --   - start breztri or trelegy - can also try adding flovent to anoro = triple inhaler therapy   - will order you a nebulizer machine   - continue albuterol HFA 2 puffs or albuterol nebulizers every 4-6 hours as needed   - heart healthy diet   - continue to great job getting active!!   - discussed pulmonary rehab - wants to work out     2. Lung cancer: CT in 3/2019 with no evidence of reoccurrence. CT in 4/2021 with new ASHLEY nodule + adenocarcinoma s/p SBRT in 8/2021.   - continue to monitor - following with Rad/ Onc --> Due 12/2023     3. Bladder cancer:   - currently on chemo    Thank you for visiting the Pulmonary clinic today!   Return to clinic 3 months  or sooner if needed   Suly Maria CNP  My office number is [phone]  Meghna is my  and Pallavi is my nurse.   Radiology scheduling [phone]   Appointment scheduling [phone]

## 2023-10-19 NOTE — PROGRESS NOTES
Patient: Ravin Camacho    [id number]  : 1955 -- AGE 68 y.o.    Provider: JAYME Maldonado-CNP     Location [facility]   Service Date: 10/19/2023              [facility]  Follow up visit note      HISTORY OF PRESENT ILLNESS       HISTORY OF PRESENT ILLNESS   Mr. Camacho is a 68 year old male former smoker with COPD and h/o of lung cancer (s/p RUL lobectomy in ) who presents today for follow-up. Last seen in clinic on 21 for a prebronch visit - 21 reoccurrence of adenocarcinoma s/p SBRT in 2021. Last seen in clinic on 23.     He states things have been going pretty crappy. He has had bladder cancer twice now over the last year and a half. Currently on chemo for this. He has been using his Anoro daily - not sure how much it is helping. He has spinal stenosis so he can't play golf anymore.  He is asking about pulmonary rehab. He is using his albuterol HFA 3-4 x a day.  He does not have a nebulizer machine. He has been on chemo for 3-4 months - he is not sure if they could have side effects. He has a slight cough - sometimes productive with clear mucous (usually more productive first thing in the morning). He will notice wheezing at times - notices when he lays down. He has CHAPA with projects around the house. He states he will work for 3-4 minutes and then take a 5 minute break. He feels he recovers quick, but has to take frequent breaks. He denies any SOB at rest, GERD, or CP.  He denies any runny nose/ nasal congestion -- maybe some very mild throat clearing, but not botheresome. He got his flu shot, RSV shot, and COVID booster last week.      CAT today 16 / ACT today 14     23: Since last visit he feels his breathing is worse. He is still using his anoro daily - states if he gets sick he ends up with a bad cough for a long time. He has CHAPA with going up the stairs/ taking the garbage out. He states he wishes  the anoro worked better. He tried spiriva -- states the anoro was better. He will at times notice wheezing. He has PRN albuterol that he will use 2-3 x a day. He uses his albuterol more proactively before exercise. He denies cough except when he is sick. He denies any SOB at rest, allergies, GERD, or chest pain.   CAT today 14     5/20/21: Since last visit he started spiriva started over the last few weeks. He felt the anoro may have worked a little better, but he wants to give it a little more time. He has a little bit of a cough in the morning. He has wheezing sporadically more so when he is lying down. CHAPA is still doing better than previous. He feels he back slide a little with the inhaler switch   He denies any SOB at rest, allergies, GERD, CP, or snoring.     4/14/21: Since last visit his breathing has been doing better with weight loss. He is not sure if the anoro is helping. He usually uses his rescue before exercise, but rarely needs it PRN. His CHAPA with exertion is much better compared to previous. He has lost 45lbs in the last 7-8 months. He previously had CHAPA with going up steps, but now is able to walk a mile before he get SOB. He has a dry cough intermittently that is rarely productive. He had wheezing previously that was especially bothersome to his wife at night. He denies any SOB at rest, allergies, GERD, CP, or snoring.     12/3/18: Mr. Camacho reports a URI in October 2018 for which he was given a Z-pack. He has been having CHAPA for 1-2 years now.  denies fevers, chills, or nights sweats  +CHAPA  denies shortness of breath at rest,   mMRC Dyspnea Score = 2  +Cough, occasional wheeze  denies hemoptysis, or stridor  denies chest pain, orthopnea, or lower extremity edema  +weight gain  denies nausea, vomiting, abdominal pain, odynophagia, dysphagia, heartburn, anorexia, or weight loss  denies sinus congestion, post-nasal drip, epistaxis, or hoarseness of voice  denies snoring, apneas, excessive  daytime somnolence, napping    Inhalers / Nebulized medications / Oxygen:    Anoro   Albuterol    Since the last visit there have been no changes to the past medical history, past surgical history, social history, or family history.      ALLERGIES AND MEDICATIONS     ALLERGIES  No Known Allergies    MEDICATIONS  Current Outpatient Medications   Medication Sig Dispense Refill    albuterol 90 mcg/actuation inhaler 2 puffs.      Anoro Ellipta 62.5-25 mcg/actuation blister with device Inhale 1 puff once daily.      atorvastatin (Lipitor) 20 mg tablet Take 1 tablet (20 mg) by mouth once daily.      blood sugar diagnostic (Blood Glucose Test) strip 1 strip by in vitro route once daily.      ergocalciferol (Vitamin D-2) 1.25 MG (19440 UT) capsule TAKE 1 CAPSULE BY MOUTH ONE TIME PER WEEK 12 capsule 2    lancets (Fingerstix Lancets) misc once daily.      losartan (Cozaar) 25 mg tablet TAKE 1 TABLET BY MOUTH EVERY  tablet 2    meloxicam (Mobic) 15 mg tablet TAKE 1 TABLET EVERY DAY AS NEEDED 30 tablet 3    metFORMIN XR (Glucophage-XR) 500 mg 24 hr tablet TAKE 2 TABLETS TWICE A  tablet 3    multivitamin with minerals (multivitamin with folic acid) tablet Take 1 tablet by mouth once daily.      tadalafil 20 mg tablet take one tablet by mouth one hour before activity as needed 20 tablet 2    docusate sodium (Colace) 100 mg capsule Take 1 capsule (100 mg) by mouth 2 times a day.      glyBURIDE (Diabeta) 5 mg tablet Take 1 tablet (5 mg) by mouth once daily.      lisinopril 10 mg tablet Take 1 tablet (10 mg) by mouth once daily.      pioglitazone (Actos) 30 mg tablet Take 1 tablet (30 mg) by mouth once daily.      tiotropium (Spiriva Respimat) 1.25 mcg/actuation inhaler Inhale 2 puffs once daily.      traMADol (Ultram) 50 mg tablet 1 tablet (50 mg).       No current facility-administered medications for this visit.         PAST HISTORY     PAST MEDICAL HISTORY  - HLD   - HTN   - lung cancer -  (s/p RUL lobectomy in  1999) who presents today for follow-up. Reoccurrence of adenocarcinoma s/p SBRT in 8/2021.  - DMII   - OA   - bladder cancer - s/p surgery in 3/2022- then s/p 6 weeks of BCG then quarterly. Kidney stone s/p surgery 3/2023- this delayed his BCG. He thinks it reoccurred related to delayed BCG -- to start a year of chemo here soon for 12 months       PAST SURGICAL HISTORY  Past Surgical History:   Procedure Laterality Date    COLONOSCOPY  12/01/2016    Complete Colonoscopy    HAND SURGERY  12/01/2016    Hand Surgery                                                                                                                                                          LUNG LOBECTOMY  05/22/2015    Lung Lobectomy    OTHER SURGICAL HISTORY  10/31/2019    Knee replacement    OTHER SURGICAL HISTORY  09/18/2022    Epidural steroid injection    OTHER SURGICAL HISTORY  09/18/2022    Bronchoscopy       IMMUNIZATION HISTORY  Immunization History   Administered Date(s) Administered    Flu vaccine, quadrivalent, high-dose, preservative free, age 65y+ (FLUZONE) 10/26/2022    Influenza, High Dose Seasonal, Preservative Free 09/23/2020, 09/29/2021    Influenza, seasonal, injectable 10/12/2023    Moderna SARS-CoV-2 Vaccination 10/12/2023    Pfizer COVID-19 vaccine, bivalent, age 12 years and older (30 mcg/0.3 mL) 10/26/2022    Pfizer Purple Cap SARS-CoV-2 03/03/2021, 03/24/2021, 10/04/2021    Pneumococcal conjugate vaccine, 13-valent (PREVNAR 13) 09/23/2020    Pneumococcal conjugate vaccine, 20-valent (PREVNAR 20) 07/19/2023    Pneumococcal polysaccharide vaccine, 23-valent, age 2 years and older (PNEUMOVAX 23) 12/03/2018    RSV, 60 Years And Older (AREXVY) 10/12/2023    Tdap vaccine, age 7 year and older (BOOSTRIX) 04/23/2015    Zoster vaccine, recombinant, adult (SHINGRIX) 06/28/2021, 10/18/2021    Zoster, live 04/26/2015       SOCIAL HISTORY / OCCUPATIONAL/ENVIRONMENTAL HISTORY   Tobacco: Former smoker > quit 1999, smoked 2 ppd  prior since age 16 (~45 pack years)   Occupation: former  / EMT,       FAMILY HISTORY  Family History   Problem Relation Name Age of Onset    Hypertension Mother      Lung cancer Father      Cancer Other Multiple Family Members       +cancer   No family history of lung diseases    RESULTS/DATA     Pulmonary Function Test Results       - 11/18/2015 - FEV1/FVC = 45% (LLN= 66%) FEV1 1.48 L (41% predicted)/ FVC 3.65 (81%)   - 2/26/19 - FEV1/FVC 0.48/ FEV1 1.65 (48%)/ FVC 3.65 (81%)  - 4/14/21: FEV1/FVC 0.43/ FEV1 1.75 (52%)/ FVC 4.09 (93%)/ TLC 6.79 (95%)/ DLCO 74%   - 10/19/23 - FEV1/FVC 0.36/FEV1 1.24 (38%)/ FVC 3.45 (80%)/ TLC 6.70 (94%)/ DLCO 59%     6MWT:    - 2/26/19 - 515m 94 -> 87% on RA, PADMA 2   - 10/19/23 - 351m 95 -> 89% on RA, PADMA 4     O2 desat: 2/26/19 95% on RA at rest -> 88% on RA with exertion, 96% on 2L with exertion     Chest Radiograph     No results found for this or any previous visit from the past 2000 days.      Chest CT Scan     --- 2/10/2017 -> s/p RUL lobectomy, emphysema, small bilateral peripheral lung nodules  --- CT chest 3/11/19 - Stable small upper lobe pulmonary nodules. Two new 2-3 mm pulmonary densities in the ASHLEY. post surgical changes consistent with a right upper lobectomy.   - 4/26/21: Significant interval enlargement of a now 18 mm spiculated left upper lobe pulmonary nodule abutting the leftward aspect of the mediastinum.  --- 6/24/21 - Minimal increase in dominant medial left upper lobe mediastinal pleural based pulmonary nodule, which corresponds to intense hypermetabolic activity on recent PET-CT, concerning for primary lung neoplasm. Tissue sampling is recommended. Postsurgical changes status post right upper lobectomy without local evidence of recurrence. No new pulmonary nodules. Multiple subcentimeter mediastinal lymph nodes as described above,  which appears stable compared to chest CT dated 04/26/2021. No new zita disease within chest. Background mild  emphysematous changes. Mild to moderate coronary artery calcifications are seen. The study is not optimized for evaluation of coronary arteries. Additional stable chronic findings as above.  --- 2/8/22- Chest/abdomen/ pelvis: CHEST: Status post right upper lobectomy, with unchanged right lung base  pleural thickening and calcification which may be sequela of treatment or prior pleural inflammatory process. No pleural effusion. No pleural nodularity. The previously noted left upper lung malignancy is significantly smaller, difficult to even measure, about 5 mm oblong nodular focus remains in the area of treated lesion, which may be scar. Continued  attention suggested. No evidence of metastatic disease in the chest.  ABDOMEN-PELVIS: There is an enhancing polypoid 3.5 cm mass in the left posterior bladder wall overlying the left UVJ, with some calcification along the superficial margin. This is highly suspicious for a bladder malignancy. Follow-up with Urology is recommended. No hydronephrosis or hydroureter at this time. No pelvic adenopathy. There is an 11 mm left para-aortic node noted which is indeterminate and if has not substantially changed in size from the prior PET-CT. No other abdominal adenopathy. Some small bilateral adrenal nodules are unchanged, cannot be characterized on this exam, most likely small adenomas. Unchanged hepatic hypodensities, the larger of which can be characterized as simple cysts, some are too small to characterize. Bilateral nonobstructing nephrolithiasis and other incidental  nonacute findings as above.  -- 8/30/22- Postoperative changes compatible with right upper lobectomy and post radiation changes within the left upper lobe without evidence of local recurrence. Interval complete resolution/resection of the posterior bladder  mass. Interval development of a solid 2 mm right middle lobe and a solid 3 mm left lower lobe pulmonary nodules. Attention on follow-up is recommended.  Pancreatic head and uncinate process cystic lesions are unchanged  compared to CT chest 02/07/2022. A pancreatic MRI with MRCP is recommended for further characterization. Nonspecific left periaortic lymphadenopathy without significant metabolic activity on prior PET-CT. Attention on follow-up is recommended. Unchanged left adrenal gland nodules without significant metabolic activity on prior PET-CT. Attention on follow-up is recommended. Bilateral nonobstructing nephrolithiasis. Prostatomegaly. Correlate with serum PSA.  --- 2/27/23 - A few mediastinal lymph nodes have enlarged compared to 6 months ago, the dominant node from 8 mm to 11 mm short axis in the subcarinal region. Metastatic disease is possible. Consider PET/CT for further evaluation. Previously described new pulmonary nodules have resolved. No new suspicious pulmonary nodules.  --- 6/5/23 - Status post right pneumonectomy. [ RUL lobectomy ]. Bilateral lung micronodules, stable since 2/23. Nonobstructing right renal calculus.    PET:   - 5/14/21 - Noncalcified, paramediastinal pulmonary lesion in the ASHLEY demonstrates abnormal hypermetabolic activity and is highly concerning for a neoplastic process. No evidence of hypermetabolic mediastinal lymphadenopathy or  metastatic disease in the rest of body. Postsurgical changes consistent with right upper lobectomy, with  no evidence of hypermetabolic recurrent disease along the surgical margins. Hypermetabolic soft tissue focus in the left parotid gland, which may represent a Warthin's tumor. Ultrasound is recommended for further characterization.  - 6/21/23 -Postsurgical changes in the right upper lobe without PET evidence of recurrent disease. Redemonstration of faint FDG avid soft tissue thickening in the left upper lobe, favored to represent post radiation changes. No PET  evidence of local recurrence. No PET evidence of locoregional zita or distant metastasis. FDG avid focus in the posterior apical  "prostate gland, nonspecific, correlate clinically. Grossly stable of previously seen FDG avid lesion in the left parotid gland, favored to represent a pleomorphic adenoma or Warthin's tumor      Echocardiogram     Echocardiogram - 8/11/23 -   CONCLUSIONS:  1. Left ventricular systolic function is normal with a 65-70% estimated ejection fraction.  RA normal size. The right ventricle is mildly enlarged - There is normal right ventricular global systolic function.       REVIEW OF SYSTEMS     REVIEW OF SYSTEMS  Review of Systems   Constitutional:  Positive for activity change, fatigue and unexpected weight change. Negative for fever.   HENT:  Negative for congestion, postnasal drip, rhinorrhea, sinus pressure and voice change.    Eyes:  Negative for pain and visual disturbance.   Cardiovascular:  Negative for chest pain, palpitations and leg swelling.   Gastrointestinal:  Negative for abdominal pain, diarrhea, nausea and vomiting.   Endocrine: Negative for cold intolerance and heat intolerance.   Musculoskeletal:  Positive for arthralgias and back pain. Negative for joint swelling and myalgias.   Skin:  Negative for rash.   Neurological:  Positive for numbness. Negative for dizziness, weakness and headaches.   Psychiatric/Behavioral:  Negative for agitation. The patient is not nervous/anxious.          PHYSICAL EXAM     VITAL SIGNS: /86   Pulse 76   Temp 36.5 °C (97.7 °F)   Resp 18   Ht 1.778 m (5' 10\")   Wt 110 kg (242 lb 3.2 oz)   SpO2 95%   BMI 34.75 kg/m²      CURRENT WEIGHT: [unfilled]  BMI: [unfilled]  PREVIOUS WEIGHTS:  Wt Readings from Last 3 Encounters:   10/19/23 110 kg (242 lb 3.2 oz)   07/19/23 104 kg (230 lb)   07/06/23 107 kg (236 lb)       Physical Exam  Vitals reviewed.   Constitutional:       General: He is not in acute distress.     Appearance: Normal appearance. He is not ill-appearing or toxic-appearing.   HENT:      Head: Normocephalic.      Nose: No rhinorrhea.   Cardiovascular:      Rate " and Rhythm: Normal rate and regular rhythm.      Heart sounds: Normal heart sounds.   Pulmonary:      Effort: Pulmonary effort is normal. No respiratory distress.      Breath sounds: Normal breath sounds. No stridor.   Abdominal:      General: Abdomen is flat.   Musculoskeletal:         General: No swelling. Normal range of motion.   Skin:     General: Skin is warm and dry.      Nails: There is no clubbing.   Neurological:      General: No focal deficit present.      Mental Status: He is alert.   Psychiatric:         Mood and Affect: Mood normal.         Behavior: Behavior normal.         Judgment: Judgment normal.         ASSESSMENT/PLAN     1. COPD: Previous PFTs with severe, but now improved to moderate obstruction. Dyspnea on exertion much improved. 2019 6MWT - 89% on RA with ambulation.   - stop Anoro 1 puff daily --   - start breztri or trelegy - can also try adding flovent to anoro = triple inhaler therapy   - will order you a nebulizer machine   - continue albuterol HFA 2 puffs or albuterol nebulizers every 4-6 hours as needed   - heart healthy diet   - continue to great job getting active!!   - discussed pulmonary rehab - wants to work out     2. Lung cancer: CT in 3/2019 with no evidence of reoccurrence. CT in 4/2021 with new ASHLEY nodule + adenocarcinoma s/p SBRT in 8/2021.   - continue to monitor - following with Rad/ Onc --> Due 12/2023     3. Bladder cancer:   - currently on chemo

## 2023-11-10 ENCOUNTER — LAB (OUTPATIENT)
Dept: LAB | Facility: LAB | Age: 68
End: 2023-11-10
Payer: MEDICARE

## 2023-11-10 DIAGNOSIS — C67.9 MALIGNANT NEOPLASM OF BLADDER, UNSPECIFIED (MULTI): Primary | ICD-10-CM

## 2023-11-10 DIAGNOSIS — C67.9 MALIGNANT NEOPLASM OF URINARY BLADDER, UNSPECIFIED SITE (MULTI): ICD-10-CM

## 2023-11-10 LAB
ALBUMIN SERPL BCP-MCNC: 4.6 G/DL (ref 3.4–5)
ALBUMIN SERPL BCP-MCNC: 4.6 G/DL (ref 3.4–5)
ANION GAP SERPL CALC-SCNC: 14 MMOL/L (ref 10–20)
ANION GAP SERPL CALC-SCNC: 16 MMOL/L (ref 10–20)
BASOPHILS # BLD AUTO: 0.03 X10*3/UL (ref 0–0.1)
BASOPHILS NFR BLD AUTO: 0.4 %
BUN SERPL-MCNC: 18 MG/DL (ref 6–23)
BUN SERPL-MCNC: 19 MG/DL (ref 6–23)
CALCIUM SERPL-MCNC: 9.5 MG/DL (ref 8.6–10.6)
CALCIUM SERPL-MCNC: 9.6 MG/DL (ref 8.6–10.6)
CHLORIDE SERPL-SCNC: 104 MMOL/L (ref 98–107)
CHLORIDE SERPL-SCNC: 104 MMOL/L (ref 98–107)
CO2 SERPL-SCNC: 27 MMOL/L (ref 21–32)
CO2 SERPL-SCNC: 29 MMOL/L (ref 21–32)
CREAT SERPL-MCNC: 1.13 MG/DL (ref 0.5–1.3)
CREAT SERPL-MCNC: 1.16 MG/DL (ref 0.5–1.3)
EOSINOPHIL # BLD AUTO: 0.08 X10*3/UL (ref 0–0.7)
EOSINOPHIL NFR BLD AUTO: 1.1 %
ERYTHROCYTE [DISTWIDTH] IN BLOOD BY AUTOMATED COUNT: 12.3 % (ref 11.5–14.5)
ERYTHROCYTE [DISTWIDTH] IN BLOOD BY AUTOMATED COUNT: 12.7 % (ref 11.5–14.5)
GFR SERPL CREATININE-BSD FRML MDRD: 69 ML/MIN/1.73M*2
GFR SERPL CREATININE-BSD FRML MDRD: 71 ML/MIN/1.73M*2
GLUCOSE SERPL-MCNC: 96 MG/DL (ref 74–99)
GLUCOSE SERPL-MCNC: 97 MG/DL (ref 74–99)
HCT VFR BLD AUTO: 56.8 % (ref 41–52)
HCT VFR BLD AUTO: 58 % (ref 41–52)
HGB BLD-MCNC: 19.3 G/DL (ref 13.5–17.5)
HGB BLD-MCNC: 19.8 G/DL (ref 13.5–17.5)
IMM GRANULOCYTES # BLD AUTO: 0.05 X10*3/UL (ref 0–0.7)
IMM GRANULOCYTES NFR BLD AUTO: 0.7 % (ref 0–0.9)
LYMPHOCYTES # BLD AUTO: 1.51 X10*3/UL (ref 1.2–4.8)
LYMPHOCYTES NFR BLD AUTO: 20.1 %
MCH RBC QN AUTO: 33.5 PG (ref 26–34)
MCH RBC QN AUTO: 33.6 PG (ref 26–34)
MCHC RBC AUTO-ENTMCNC: 34 G/DL (ref 32–36)
MCHC RBC AUTO-ENTMCNC: 34.1 G/DL (ref 32–36)
MCV RBC AUTO: 98 FL (ref 80–100)
MCV RBC AUTO: 99 FL (ref 80–100)
MONOCYTES # BLD AUTO: 0.7 X10*3/UL (ref 0.1–1)
MONOCYTES NFR BLD AUTO: 9.3 %
NEUTROPHILS # BLD AUTO: 5.14 X10*3/UL (ref 1.2–7.7)
NEUTROPHILS NFR BLD AUTO: 68.4 %
NRBC BLD-RTO: 0 /100 WBCS (ref 0–0)
NRBC BLD-RTO: 0 /100 WBCS (ref 0–0)
PHOSPHATE SERPL-MCNC: 3.5 MG/DL (ref 2.5–4.9)
PHOSPHATE SERPL-MCNC: 3.6 MG/DL (ref 2.5–4.9)
PLATELET # BLD AUTO: 202 X10*3/UL (ref 150–450)
PLATELET # BLD AUTO: 207 X10*3/UL (ref 150–450)
POTASSIUM SERPL-SCNC: 4.8 MMOL/L (ref 3.5–5.3)
POTASSIUM SERPL-SCNC: 5 MMOL/L (ref 3.5–5.3)
RBC # BLD AUTO: 5.74 X10*6/UL (ref 4.5–5.9)
RBC # BLD AUTO: 5.91 X10*6/UL (ref 4.5–5.9)
SODIUM SERPL-SCNC: 142 MMOL/L (ref 136–145)
SODIUM SERPL-SCNC: 142 MMOL/L (ref 136–145)
WBC # BLD AUTO: 7.5 X10*3/UL (ref 4.4–11.3)
WBC # BLD AUTO: 7.7 X10*3/UL (ref 4.4–11.3)

## 2023-11-10 PROCEDURE — 80069 RENAL FUNCTION PANEL: CPT

## 2023-11-10 PROCEDURE — 85025 COMPLETE CBC W/AUTO DIFF WBC: CPT

## 2023-11-10 PROCEDURE — 87086 URINE CULTURE/COLONY COUNT: CPT

## 2023-11-10 PROCEDURE — 81001 URINALYSIS AUTO W/SCOPE: CPT

## 2023-11-12 LAB — BACTERIA UR CULT: NO GROWTH

## 2023-11-13 ASSESSMENT — ENCOUNTER SYMPTOMS
UNEXPECTED WEIGHT CHANGE: 1
DIZZINESS: 0
JOINT SWELLING: 0
BACK PAIN: 1
PALPITATIONS: 0
EYE PAIN: 0
FATIGUE: 1
HEADACHES: 0
RHINORRHEA: 0
MYALGIAS: 0
VOICE CHANGE: 0
AGITATION: 0
NUMBNESS: 1
DIARRHEA: 0
ABDOMINAL PAIN: 0
VOMITING: 0
FEVER: 0
SINUS PRESSURE: 0
WEAKNESS: 0
NERVOUS/ANXIOUS: 0
NAUSEA: 0
ARTHRALGIAS: 1
ACTIVITY CHANGE: 1

## 2023-11-13 NOTE — PROGRESS NOTES
Subjective   Patient ID: Ravin Camacho is a 68 y.o. male who presents for monthly maintenance Gemcitabine/Docetaxel intravesical instillation.     HPI  History of lung CA, bladder mass with surgical pathology on 03/07/22 demonstrating invasive papillary urothelial carcinoma, high grade, s/p TURBT with Dr Mcknight on 03/01/2023.     BCG with maintenance cycle 1 completed on Oct 17th 2022. Renal US on 1/10/23 demonstrated bilateral nonobstructing nephrolithiasis and cystoscopy completed on 2/9/23 demonstrated 1 small bladder tumor to the left posterior wall. Surgical pathology on 3/1/23 demonstrating non-invasive papillary urothelial carcinoma, high grade.     07/11/2023 Cystoscopy with Dr. Frausto following 6-week BCG maintenance was unremarkable.     Review of Systems  All other systems have been reviewed and are negative for complaint.    Objective   Physical Exam  General: Well developed, well nourished, alert and cooperative, appears in no acute distress  Eyes: Non-injected conjunctiva, sclera clear, no proptosis  Cardiac: Extremities are warm and well perfused. No edema, cyanosis or pallor.   Lungs: Breathing is easy, non-labored. Speaking in clear and complete sentences. Normal diaphragmatic movement.  MSK: Ambulatory with steady gait, unassisted  Neuro: alert and oriented to person, place and time  Psych: Demonstrates good judgement and reason, without hallucinations, abnormal affect or abnormal behaviors.  Skin: no obvious lesions, no rashes.    Assessment/Plan        Pts current cysto schedule: q3 months until 03/2025, q6 until 03/2028, and yearly after no recurrences.    Today we discussed in great detail risks, benefits, and possible adverse reactions to Gemcitabine/Docetaxel intravesical Chemo instillations. Gemcitabine 1000 mg and Docetaxel 37.5 mg will be instilled intravesically weekly for 6 weeks with a maximum of 2 cycles. Avoid drinking fluids 4 hours prior to instillation    Pretreatment:    Patient will complete CBC, RFP, Urinalysis, and Urine Culture every Friday prior to Tuesday Bladder instillation.  Hold treatment if patient experiences urinary frequency or urgency lasting > 24 hour, suprapubic pain, grade 3 dysuria, grade 2 gross hematuria (without clots) or grade 3 hematuria (with clots) lasting > 48 hours, or platelets are < 75,000/mm3    After administration:  Contact office for new or worsening signs and symptoms of urinary frequency, urgency, hematuria, dysuria, or myalgia.  Void in a seated position to avoid splashing of urine  Flush toilet twice after first void  Wash hands thoroughly with soap and water after urinating  Drink plenty of water in order to flush the bladder    Patient will return to [facility] for monthly maintenance Gemcitabine/Docetaxel intravesical instillations x 1 year (07/2024) All questions and concerns were addressed. Patient verbalizes understanding and has no other questions at this time.     Charleen Sutton-- ROGERS DELACRUZ  Office Phone: [phone]

## 2023-11-14 ENCOUNTER — PROCEDURE VISIT (OUTPATIENT)
Dept: UROLOGY | Facility: HOSPITAL | Age: 68
End: 2023-11-14
Payer: MEDICARE

## 2023-11-14 DIAGNOSIS — C67.9 MALIGNANT NEOPLASM OF URINARY BLADDER, UNSPECIFIED SITE (MULTI): Primary | ICD-10-CM

## 2023-11-14 PROCEDURE — 2500000004 HC RX 250 GENERAL PHARMACY W/ HCPCS (ALT 636 FOR OP/ED): Performed by: UROLOGY

## 2023-11-14 PROCEDURE — 51720 TREATMENT OF BLADDER LESION: CPT | Performed by: UROLOGY

## 2023-11-14 RX ORDER — FAMOTIDINE 10 MG/ML
20 INJECTION INTRAVENOUS ONCE AS NEEDED
Status: DISCONTINUED | OUTPATIENT
Start: 2023-11-14 | End: 2023-11-14 | Stop reason: HOSPADM

## 2023-11-14 RX ORDER — ALBUTEROL SULFATE 0.83 MG/ML
3 SOLUTION RESPIRATORY (INHALATION) AS NEEDED
Status: DISCONTINUED | OUTPATIENT
Start: 2023-11-14 | End: 2023-11-14 | Stop reason: HOSPADM

## 2023-11-14 RX ORDER — DIPHENHYDRAMINE HYDROCHLORIDE 50 MG/ML
50 INJECTION INTRAMUSCULAR; INTRAVENOUS AS NEEDED
Status: DISCONTINUED | OUTPATIENT
Start: 2023-11-14 | End: 2023-11-14 | Stop reason: HOSPADM

## 2023-11-14 RX ORDER — PROCHLORPERAZINE MALEATE 10 MG
10 TABLET ORAL EVERY 6 HOURS PRN
Status: DISCONTINUED | OUTPATIENT
Start: 2023-11-14 | End: 2023-11-14 | Stop reason: HOSPADM

## 2023-11-14 RX ORDER — PROCHLORPERAZINE EDISYLATE 5 MG/ML
10 INJECTION INTRAMUSCULAR; INTRAVENOUS EVERY 6 HOURS PRN
Status: DISCONTINUED | OUTPATIENT
Start: 2023-11-14 | End: 2023-11-14 | Stop reason: HOSPADM

## 2023-11-14 RX ORDER — EPINEPHRINE 0.3 MG/.3ML
0.3 INJECTION SUBCUTANEOUS EVERY 5 MIN PRN
Status: DISCONTINUED | OUTPATIENT
Start: 2023-11-14 | End: 2023-11-14 | Stop reason: HOSPADM

## 2023-11-14 RX ADMIN — DOCETAXEL 37.5 MG: 20 INJECTION, SOLUTION INTRAVENOUS at 12:34

## 2023-11-14 RX ADMIN — GEMCITABINE 1000 MG: 38 INJECTION, SOLUTION INTRAVENOUS at 11:30

## 2023-11-14 NOTE — PROGRESS NOTES
1130 Gemcitabine intravesicular to bladder instilled and held as directed by pt for 1 hr.  Slight discomfort at the end of the hr per pt, but was able to complete and drained completely.    1230 Docetaxel instilled and catheter removed.  Pt instructed to hold for 2 hrs post.  Verbalized understanding.

## 2023-11-15 DIAGNOSIS — E78.5 HYPERLIPIDEMIA, UNSPECIFIED: ICD-10-CM

## 2023-11-15 DIAGNOSIS — J42 CHRONIC BRONCHITIS, UNSPECIFIED CHRONIC BRONCHITIS TYPE (MULTI): Primary | ICD-10-CM

## 2023-11-15 RX ORDER — ATORVASTATIN CALCIUM 20 MG/1
20 TABLET, FILM COATED ORAL DAILY
Qty: 90 TABLET | Refills: 3 | Status: SHIPPED | OUTPATIENT
Start: 2023-11-15

## 2023-11-16 RX ORDER — ALBUTEROL SULFATE 90 UG/1
AEROSOL, METERED RESPIRATORY (INHALATION)
Qty: 18 G | Refills: 11 | Status: SHIPPED | OUTPATIENT
Start: 2023-11-16

## 2023-12-08 ENCOUNTER — LAB (OUTPATIENT)
Dept: LAB | Facility: LAB | Age: 68
End: 2023-12-08
Payer: MEDICARE

## 2023-12-08 DIAGNOSIS — C67.9 MALIGNANT NEOPLASM OF URINARY BLADDER, UNSPECIFIED SITE (MULTI): ICD-10-CM

## 2023-12-08 DIAGNOSIS — N52.9 ERECTILE DYSFUNCTION, UNSPECIFIED ERECTILE DYSFUNCTION TYPE: ICD-10-CM

## 2023-12-08 LAB
ALBUMIN SERPL BCP-MCNC: 4.5 G/DL (ref 3.4–5)
ANION GAP SERPL CALC-SCNC: 13 MMOL/L (ref 10–20)
APPEARANCE UR: ABNORMAL
BACTERIA #/AREA URNS AUTO: ABNORMAL /HPF
BASOPHILS # BLD AUTO: 0.03 X10*3/UL (ref 0–0.1)
BASOPHILS NFR BLD AUTO: 0.4 %
BILIRUB UR STRIP.AUTO-MCNC: NEGATIVE MG/DL
BUN SERPL-MCNC: 17 MG/DL (ref 6–23)
CALCIUM SERPL-MCNC: 9.8 MG/DL (ref 8.6–10.6)
CHLORIDE SERPL-SCNC: 105 MMOL/L (ref 98–107)
CO2 SERPL-SCNC: 28 MMOL/L (ref 21–32)
COLOR UR: ABNORMAL
CREAT SERPL-MCNC: 1.13 MG/DL (ref 0.5–1.3)
EOSINOPHIL # BLD AUTO: 0.08 X10*3/UL (ref 0–0.7)
EOSINOPHIL NFR BLD AUTO: 1 %
ERYTHROCYTE [DISTWIDTH] IN BLOOD BY AUTOMATED COUNT: 12.8 % (ref 11.5–14.5)
GFR SERPL CREATININE-BSD FRML MDRD: 71 ML/MIN/1.73M*2
GLUCOSE SERPL-MCNC: 109 MG/DL (ref 74–99)
GLUCOSE UR STRIP.AUTO-MCNC: NEGATIVE MG/DL
HCT VFR BLD AUTO: 58.3 % (ref 41–52)
HGB BLD-MCNC: 19.9 G/DL (ref 13.5–17.5)
IMM GRANULOCYTES # BLD AUTO: 0.04 X10*3/UL (ref 0–0.7)
IMM GRANULOCYTES NFR BLD AUTO: 0.5 % (ref 0–0.9)
KETONES UR STRIP.AUTO-MCNC: NEGATIVE MG/DL
LEUKOCYTE ESTERASE UR QL STRIP.AUTO: NEGATIVE
LYMPHOCYTES # BLD AUTO: 1.47 X10*3/UL (ref 1.2–4.8)
LYMPHOCYTES NFR BLD AUTO: 19 %
MCH RBC QN AUTO: 33.8 PG (ref 26–34)
MCHC RBC AUTO-ENTMCNC: 34.1 G/DL (ref 32–36)
MCV RBC AUTO: 99 FL (ref 80–100)
MONOCYTES # BLD AUTO: 0.67 X10*3/UL (ref 0.1–1)
MONOCYTES NFR BLD AUTO: 8.6 %
MUCOUS THREADS #/AREA URNS AUTO: ABNORMAL /LPF
NEUTROPHILS # BLD AUTO: 5.46 X10*3/UL (ref 1.2–7.7)
NEUTROPHILS NFR BLD AUTO: 70.5 %
NITRITE UR QL STRIP.AUTO: NEGATIVE
NRBC BLD-RTO: 0 /100 WBCS (ref 0–0)
PH UR STRIP.AUTO: 5 [PH]
PHOSPHATE SERPL-MCNC: 4.5 MG/DL (ref 2.5–4.9)
PLATELET # BLD AUTO: 208 X10*3/UL (ref 150–450)
POTASSIUM SERPL-SCNC: 5 MMOL/L (ref 3.5–5.3)
PROT UR STRIP.AUTO-MCNC: ABNORMAL MG/DL
RBC # BLD AUTO: 5.89 X10*6/UL (ref 4.5–5.9)
RBC # UR STRIP.AUTO: NEGATIVE /UL
RBC #/AREA URNS AUTO: ABNORMAL /HPF
SODIUM SERPL-SCNC: 141 MMOL/L (ref 136–145)
SP GR UR STRIP.AUTO: 1.02
UROBILINOGEN UR STRIP.AUTO-MCNC: <2 MG/DL
WBC # BLD AUTO: 7.8 X10*3/UL (ref 4.4–11.3)
WBC #/AREA URNS AUTO: ABNORMAL /HPF

## 2023-12-08 PROCEDURE — 81001 URINALYSIS AUTO W/SCOPE: CPT

## 2023-12-08 PROCEDURE — 80069 RENAL FUNCTION PANEL: CPT

## 2023-12-08 PROCEDURE — 36415 COLL VENOUS BLD VENIPUNCTURE: CPT

## 2023-12-08 PROCEDURE — 85025 COMPLETE CBC W/AUTO DIFF WBC: CPT

## 2023-12-08 RX ORDER — TADALAFIL 20 MG/1
20 TABLET ORAL AS NEEDED
Qty: 20 TABLET | Refills: 1 | Status: SHIPPED | OUTPATIENT
Start: 2023-12-08 | End: 2024-01-18 | Stop reason: SDUPTHER

## 2023-12-12 ENCOUNTER — PROCEDURE VISIT (OUTPATIENT)
Dept: UROLOGY | Facility: HOSPITAL | Age: 68
End: 2023-12-12
Payer: MEDICARE

## 2023-12-12 DIAGNOSIS — C67.9 MALIGNANT NEOPLASM OF URINARY BLADDER, UNSPECIFIED SITE (MULTI): Primary | ICD-10-CM

## 2023-12-12 PROCEDURE — 2500000004 HC RX 250 GENERAL PHARMACY W/ HCPCS (ALT 636 FOR OP/ED): Performed by: UROLOGY

## 2023-12-12 PROCEDURE — 51720 TREATMENT OF BLADDER LESION: CPT | Performed by: NURSE PRACTITIONER

## 2023-12-12 RX ORDER — FAMOTIDINE 10 MG/ML
20 INJECTION INTRAVENOUS ONCE AS NEEDED
Status: CANCELLED | OUTPATIENT
Start: 2023-12-12

## 2023-12-12 RX ORDER — PROCHLORPERAZINE EDISYLATE 5 MG/ML
10 INJECTION INTRAMUSCULAR; INTRAVENOUS EVERY 6 HOURS PRN
Status: DISCONTINUED | OUTPATIENT
Start: 2023-12-12 | End: 2023-12-12 | Stop reason: HOSPADM

## 2023-12-12 RX ORDER — EPINEPHRINE 0.3 MG/.3ML
0.3 INJECTION SUBCUTANEOUS EVERY 5 MIN PRN
Status: CANCELLED | OUTPATIENT
Start: 2023-12-12

## 2023-12-12 RX ORDER — DIPHENHYDRAMINE HYDROCHLORIDE 50 MG/ML
50 INJECTION INTRAMUSCULAR; INTRAVENOUS AS NEEDED
Status: CANCELLED | OUTPATIENT
Start: 2023-12-12

## 2023-12-12 RX ORDER — PROCHLORPERAZINE MALEATE 10 MG
10 TABLET ORAL EVERY 6 HOURS PRN
Status: DISCONTINUED | OUTPATIENT
Start: 2023-12-12 | End: 2023-12-12 | Stop reason: HOSPADM

## 2023-12-12 RX ORDER — ALBUTEROL SULFATE 0.83 MG/ML
3 SOLUTION RESPIRATORY (INHALATION) AS NEEDED
Status: CANCELLED | OUTPATIENT
Start: 2023-12-12

## 2023-12-12 RX ADMIN — GEMCITABINE 1000 MG: 38 INJECTION, SOLUTION INTRAVENOUS at 12:28

## 2023-12-12 RX ADMIN — DOCETAXEL ANHYDROUS 37.5 MG: 10 INJECTION, SOLUTION INTRAVENOUS at 13:49

## 2023-12-12 NOTE — PROGRESS NOTES
Subjective   Patient ID: Ravin Camacho is a 68 y.o. male who presents for monthly maintenance Gemcitabine/Docetaxel intravesical instillation. (16 F coude)     HPI  History of lung CA, bladder mass with surgical pathology on 03/07/22 demonstrating invasive papillary urothelial carcinoma, high grade, s/p TURBT with Dr Mcknight on 03/01/2023.     BCG with maintenance cycle 1 completed on Oct 17th 2022. Renal US on 1/10/23 demonstrated bilateral nonobstructing nephrolithiasis and cystoscopy completed on 2/9/23 demonstrated 1 small bladder tumor to the left posterior wall. Surgical pathology on 3/1/23 demonstrating non-invasive papillary urothelial carcinoma, high grade.     07/11/2023 Cystoscopy with Dr. Frausto following 6-week BCG maintenance was unremarkable.       Review of Systems  All other systems have been reviewed and are negative for complaint.    Objective   Physical Exam  General: Well developed, well nourished, alert and cooperative, appears in no acute distress  Eyes: Non-injected conjunctiva, sclera clear, no proptosis  Cardiac: Extremities are warm and well perfused. No edema, cyanosis or pallor.   Lungs: Breathing is easy, non-labored. Speaking in clear and complete sentences. Normal diaphragmatic movement.  MSK: Ambulatory with steady gait, unassisted  Neuro: alert and oriented to person, place and time  Psych: Demonstrates good judgement and reason, without hallucinations, abnormal affect or abnormal behaviors.  Skin: no obvious lesions, no rashes.    Assessment/Plan        Pts current cysto schedule: q3 months until 03/2025, q6 until 03/2028, and yearly after no recurrences.    Today we discussed in great detail risks, benefits, and possible adverse reactions to Gemcitabine/Docetaxel intravesical Chemo instillations. Gemcitabine 1000 mg and Docetaxel 37.5 mg will be instilled intravesically weekly for 6 weeks with a maximum of 2 cycles. Avoid drinking fluids 4 hours prior to  instillation    Pretreatment:   Patient will complete CBC, RFP, Urinalysis, and Urine Culture every Friday prior to Tuesday Bladder instillation.  Hold treatment if patient experiences urinary frequency or urgency lasting > 24 hour, suprapubic pain, grade 3 dysuria, grade 2 gross hematuria (without clots) or grade 3 hematuria (with clots) lasting > 48 hours, or platelets are < 75,000/mm3    After administration:  Contact office for new or worsening signs and symptoms of urinary frequency, urgency, hematuria, dysuria, or myalgia.  Void in a seated position to avoid splashing of urine  Flush toilet twice after first void  Wash hands thoroughly with soap and water after urinating  Drink plenty of water in order to flush the bladder    Patient will return to [facility] for monthly maintenance Gemcitabine/Docetaxel intravesical instillations x 1 year (07/2024) All questions and concerns were addressed. Patient verbalizes understanding and has no other questions at this time.     Charleen Sutton-- ROGERS DELACRUZ  Office Phone: [phone]

## 2023-12-12 NOTE — PROGRESS NOTES
Robertson 14F catheter easily placed, no bleeding present  1000 mg Gemcitabine  instilled after bladder was drained.  catheter clamped, pt had no difficulty retaining urine for one hour  then unclamped, bladder emptied for approx 50 cc clear yellow urine  then instilled Docetaxel 37.5 mg and removed cath. Pt instructed to maintain in bladder for approx. 2hrs. Pt verbalized understanding and agrees to plan. Pt  tolerated well.

## 2023-12-18 ENCOUNTER — ANCILLARY PROCEDURE (OUTPATIENT)
Dept: RADIOLOGY | Facility: CLINIC | Age: 68
End: 2023-12-18
Payer: MEDICARE

## 2023-12-18 DIAGNOSIS — C34.10 MALIGNANT NEOPLASM OF UPPER LOBE, UNSPECIFIED BRONCHUS OR LUNG (MULTI): ICD-10-CM

## 2023-12-18 PROCEDURE — 71250 CT THORAX DX C-: CPT

## 2023-12-18 PROCEDURE — 71250 CT THORAX DX C-: CPT | Performed by: RADIOLOGY

## 2023-12-19 ENCOUNTER — HOSPITAL ENCOUNTER (OUTPATIENT)
Dept: RADIATION ONCOLOGY | Facility: CLINIC | Age: 68
Setting detail: RADIATION/ONCOLOGY SERIES
End: 2023-12-19
Payer: MEDICARE

## 2023-12-20 ENCOUNTER — APPOINTMENT (OUTPATIENT)
Dept: RADIATION ONCOLOGY | Facility: CLINIC | Age: 68
End: 2023-12-20
Payer: MEDICARE

## 2023-12-20 ENCOUNTER — HOSPITAL ENCOUNTER (OUTPATIENT)
Dept: RADIATION ONCOLOGY | Facility: CLINIC | Age: 68
Setting detail: RADIATION/ONCOLOGY SERIES
Discharge: HOME | End: 2023-12-20
Payer: MEDICARE

## 2023-12-20 DIAGNOSIS — C34.12 MALIGNANT NEOPLASM OF UPPER LOBE OF LEFT LUNG (MULTI): Primary | ICD-10-CM

## 2023-12-20 PROCEDURE — 99212 OFFICE O/P EST SF 10 MIN: CPT | Performed by: RADIOLOGY

## 2023-12-20 NOTE — PROGRESS NOTES
Radiation Oncology Follow-Up    Patient Name:  Ravin Camacho  MRN:  [id number]  :  1955    Referring Provider: No ref. provider found  Primary Care Provider: Jose Antonio Prado MD  Care Team: Patient Care Team:  Jose Antonio Prado MD as PCP - General  Jose Antonio Prado MD as PCP - [employer] PCP    Date of Service: 2023     SUBJECTIVE  History of Present Illness:   Ravin Camacho is a 68 y.o. male who was previously seen at the [facility] of Radiation Oncology for NSCLC.  He is s/p RUL lobectomy  for NSCLC, now with stage 1A2 [id number] (by PET) 1.8 cm adenoca of ASHLEY poor PFTs > Definitive SBRT 50 Gy 5 fx completed 21.    Recent surveillance CT 23 showed FINDINGS:  Post treatment change seen in the left upper lobe from prior  radiation with coalescent soft tissue density seen on image 99 this  now measures 1.1 cm previously 1 cm on image 99. Sagittal examination  on image 47 compared image 48 also appears to be somewhat more  prominent with soft tissue density. This could be evolving radiation  fibrosis. Continued surveillance advised. Postsurgical changes seen  from right upper lobectomy. Emphysema. Apical scarring detected.  Pleural calcifications seen on the right not significantly changed.      Fatty infiltration of the liver. Stable hepatic hypodensities. Post  treatment change seen at the GE junction. Vascular calcification  detected      IMPRESSION:  Post treatment change seen in the lungs. Post radiation change seen  in the left lung apex with slightly more coalescent soft tissue  possibly representing radiation fibrosis but progressive tumor is  also consideration.. Follow-up is advised to exclude any local  progression of disease. PET-CT may be of diagnostic benefit to  evaluate vascular calcification.        Performance Status:   The Karnofsky performance scale today is 90, Able to carry on normal activity; minor signs or  symptoms of disease (ECOG equivalent 0).       ASSESSMENT:  Ravin Camacho is a 68 y.o. male with a history of NSCLC s/p RUL lobectomy 1999 and SBRT ASHLEY 2021.   PLAN:  Per radiology recommendations PET CT 3 months .  NCCN Guidelines were applicable to guide this patients treatment plan.  Kelly Holder MD

## 2023-12-21 ENCOUNTER — APPOINTMENT (OUTPATIENT)
Dept: RADIATION ONCOLOGY | Facility: CLINIC | Age: 68
End: 2023-12-21
Payer: MEDICARE

## 2024-01-05 ENCOUNTER — LAB (OUTPATIENT)
Dept: LAB | Facility: LAB | Age: 69
End: 2024-01-05
Payer: MEDICARE

## 2024-01-05 DIAGNOSIS — C67.9 MALIGNANT NEOPLASM OF URINARY BLADDER, UNSPECIFIED SITE (MULTI): ICD-10-CM

## 2024-01-05 LAB
ALBUMIN SERPL BCP-MCNC: 4.5 G/DL (ref 3.4–5)
ANION GAP SERPL CALC-SCNC: 14 MMOL/L (ref 10–20)
BASOPHILS # BLD AUTO: 0.03 X10*3/UL (ref 0–0.1)
BASOPHILS NFR BLD AUTO: 0.4 %
BUN SERPL-MCNC: 19 MG/DL (ref 6–23)
CALCIUM SERPL-MCNC: 9.9 MG/DL (ref 8.6–10.6)
CHLORIDE SERPL-SCNC: 106 MMOL/L (ref 98–107)
CO2 SERPL-SCNC: 31 MMOL/L (ref 21–32)
CREAT SERPL-MCNC: 1.2 MG/DL (ref 0.5–1.3)
EOSINOPHIL # BLD AUTO: 0.08 X10*3/UL (ref 0–0.7)
EOSINOPHIL NFR BLD AUTO: 1 %
ERYTHROCYTE [DISTWIDTH] IN BLOOD BY AUTOMATED COUNT: 13 % (ref 11.5–14.5)
GFR SERPL CREATININE-BSD FRML MDRD: 66 ML/MIN/1.73M*2
GLUCOSE SERPL-MCNC: 120 MG/DL (ref 74–99)
HCT VFR BLD AUTO: 58.8 % (ref 41–52)
HGB BLD-MCNC: 19.7 G/DL (ref 13.5–17.5)
IMM GRANULOCYTES # BLD AUTO: 0.07 X10*3/UL (ref 0–0.7)
IMM GRANULOCYTES NFR BLD AUTO: 0.9 % (ref 0–0.9)
LYMPHOCYTES # BLD AUTO: 1.49 X10*3/UL (ref 1.2–4.8)
LYMPHOCYTES NFR BLD AUTO: 18.2 %
MCH RBC QN AUTO: 34 PG (ref 26–34)
MCHC RBC AUTO-ENTMCNC: 33.5 G/DL (ref 32–36)
MCV RBC AUTO: 102 FL (ref 80–100)
MONOCYTES # BLD AUTO: 0.83 X10*3/UL (ref 0.1–1)
MONOCYTES NFR BLD AUTO: 10.1 %
NEUTROPHILS # BLD AUTO: 5.7 X10*3/UL (ref 1.2–7.7)
NEUTROPHILS NFR BLD AUTO: 69.4 %
NRBC BLD-RTO: 0 /100 WBCS (ref 0–0)
PHOSPHATE SERPL-MCNC: 3.8 MG/DL (ref 2.5–4.9)
PLATELET # BLD AUTO: 224 X10*3/UL (ref 150–450)
POTASSIUM SERPL-SCNC: 5.5 MMOL/L (ref 3.5–5.3)
RBC # BLD AUTO: 5.79 X10*6/UL (ref 4.5–5.9)
SODIUM SERPL-SCNC: 145 MMOL/L (ref 136–145)
WBC # BLD AUTO: 8.2 X10*3/UL (ref 4.4–11.3)

## 2024-01-05 PROCEDURE — 80069 RENAL FUNCTION PANEL: CPT

## 2024-01-05 PROCEDURE — 81001 URINALYSIS AUTO W/SCOPE: CPT

## 2024-01-05 PROCEDURE — 36415 COLL VENOUS BLD VENIPUNCTURE: CPT

## 2024-01-05 PROCEDURE — 85025 COMPLETE CBC W/AUTO DIFF WBC: CPT

## 2024-01-06 LAB
APPEARANCE UR: ABNORMAL
BILIRUB UR STRIP.AUTO-MCNC: NEGATIVE MG/DL
CAOX CRY #/AREA UR COMP ASSIST: NORMAL /HPF
COLOR UR: ABNORMAL
GLUCOSE UR STRIP.AUTO-MCNC: NEGATIVE MG/DL
KETONES UR STRIP.AUTO-MCNC: ABNORMAL MG/DL
LEUKOCYTE ESTERASE UR QL STRIP.AUTO: NEGATIVE
MUCOUS THREADS #/AREA URNS AUTO: NORMAL /LPF
NITRITE UR QL STRIP.AUTO: NEGATIVE
PH UR STRIP.AUTO: 5 [PH]
PROT UR STRIP.AUTO-MCNC: ABNORMAL MG/DL
RBC # UR STRIP.AUTO: NEGATIVE /UL
RBC #/AREA URNS AUTO: NORMAL /HPF
SP GR UR STRIP.AUTO: 1.02
UROBILINOGEN UR STRIP.AUTO-MCNC: 2 MG/DL
WBC #/AREA URNS AUTO: NORMAL /HPF

## 2024-01-08 NOTE — PROGRESS NOTES
Subjective   Patient ID: Ravin Camacho is a 68 y.o. male who presents for monthly maintenance Gemcitabine/Docetaxel intravesical instillation. (16 F coude)     HPI  History of lung CA, bladder mass with surgical pathology on 03/07/22 demonstrating invasive papillary urothelial carcinoma, high grade, s/p TURBT with Dr Mcknight on 03/01/2023.     BCG with maintenance cycle 1 completed on Oct 17th 2022. Renal US on 1/10/23 demonstrated bilateral nonobstructing nephrolithiasis and cystoscopy completed on 2/9/23 demonstrated 1 small bladder tumor to the left posterior wall. Surgical pathology on 3/1/23 demonstrating non-invasive papillary urothelial carcinoma, high grade.       Review of Systems  All other systems have been reviewed and are negative for complaint.    Objective   Physical Exam  General: Well developed, well nourished, alert and cooperative, appears in no acute distress  Eyes: Non-injected conjunctiva, sclera clear, no proptosis  Cardiac: Extremities are warm and well perfused. No edema, cyanosis or pallor.   Lungs: Breathing is easy, non-labored. Speaking in clear and complete sentences. Normal diaphragmatic movement.  MSK: Ambulatory with steady gait, unassisted  Neuro: alert and oriented to person, place and time  Psych: Demonstrates good judgement and reason, without hallucinations, abnormal affect or abnormal behaviors.  Skin: no obvious lesions, no rashes.    Assessment/Plan        Pts current cysto schedule: q3 months until 03/2025, q6 until 03/2028, and yearly after no recurrences.    Today we discussed in great detail risks, benefits, and possible adverse reactions to Gemcitabine/Docetaxel intravesical Chemo instillations. Gemcitabine 1000 mg and Docetaxel 37.5 mg will be instilled intravesically weekly for 6 weeks with a maximum of 2 cycles. Avoid drinking fluids 4 hours prior to instillation    Pretreatment:   Patient will complete CBC, RFP, Urinalysis, and Urine Culture every Friday prior  to Tuesday Bladder instillation.  Hold treatment if patient experiences urinary frequency or urgency lasting > 24 hour, suprapubic pain, grade 3 dysuria, grade 2 gross hematuria (without clots) or grade 3 hematuria (with clots) lasting > 48 hours, or platelets are < 75,000/mm3    After administration:  Contact office for new or worsening signs and symptoms of urinary frequency, urgency, hematuria, dysuria, or myalgia.  Void in a seated position to avoid splashing of urine  Flush toilet twice after first void  Wash hands thoroughly with soap and water after urinating  Drink plenty of water in order to flush the bladder    Patient will return to [facility] for monthly maintenance Gemcitabine/Docetaxel intravesical instillations x 1 year (07/2024) All questions and concerns were addressed. Patient verbalizes understanding and has no other questions at this time.     Next cystoscopy scheduled with Dr. Frausto 01/18/2024    Charleen Sutton-- ROGERS DELACRUZ  Office Phone: [phone]

## 2024-01-09 ENCOUNTER — PROCEDURE VISIT (OUTPATIENT)
Dept: UROLOGY | Facility: HOSPITAL | Age: 69
End: 2024-01-09
Payer: MEDICARE

## 2024-01-09 DIAGNOSIS — C67.9 MALIGNANT NEOPLASM OF URINARY BLADDER, UNSPECIFIED SITE (MULTI): Primary | ICD-10-CM

## 2024-01-09 PROCEDURE — 2500000004 HC RX 250 GENERAL PHARMACY W/ HCPCS (ALT 636 FOR OP/ED): Performed by: UROLOGY

## 2024-01-09 PROCEDURE — 99214 OFFICE O/P EST MOD 30 MIN: CPT | Performed by: NURSE PRACTITIONER

## 2024-01-09 PROCEDURE — 51720 TREATMENT OF BLADDER LESION: CPT | Performed by: NURSE PRACTITIONER

## 2024-01-09 PROCEDURE — 51702 INSERT TEMP BLADDER CATH: CPT | Performed by: NURSE PRACTITIONER

## 2024-01-09 RX ORDER — ALBUTEROL SULFATE 0.83 MG/ML
3 SOLUTION RESPIRATORY (INHALATION) AS NEEDED
Status: CANCELLED | OUTPATIENT
Start: 2024-04-02

## 2024-01-09 RX ORDER — PROCHLORPERAZINE EDISYLATE 5 MG/ML
10 INJECTION INTRAMUSCULAR; INTRAVENOUS EVERY 6 HOURS PRN
Status: CANCELLED | OUTPATIENT
Start: 2024-04-30

## 2024-01-09 RX ORDER — PROCHLORPERAZINE EDISYLATE 5 MG/ML
10 INJECTION INTRAMUSCULAR; INTRAVENOUS EVERY 6 HOURS PRN
Status: CANCELLED | OUTPATIENT
Start: 2024-04-02

## 2024-01-09 RX ORDER — FAMOTIDINE 10 MG/ML
20 INJECTION INTRAVENOUS ONCE AS NEEDED
Status: CANCELLED | OUTPATIENT
Start: 2024-04-30

## 2024-01-09 RX ORDER — ALBUTEROL SULFATE 0.83 MG/ML
3 SOLUTION RESPIRATORY (INHALATION) AS NEEDED
Status: CANCELLED | OUTPATIENT
Start: 2024-03-05

## 2024-01-09 RX ORDER — PROCHLORPERAZINE EDISYLATE 5 MG/ML
10 INJECTION INTRAMUSCULAR; INTRAVENOUS EVERY 6 HOURS PRN
Status: CANCELLED | OUTPATIENT
Start: 2024-03-05

## 2024-01-09 RX ORDER — ALBUTEROL SULFATE 0.83 MG/ML
3 SOLUTION RESPIRATORY (INHALATION) AS NEEDED
Status: CANCELLED | OUTPATIENT
Start: 2024-02-06

## 2024-01-09 RX ORDER — PROCHLORPERAZINE EDISYLATE 5 MG/ML
10 INJECTION INTRAMUSCULAR; INTRAVENOUS EVERY 6 HOURS PRN
Status: CANCELLED | OUTPATIENT
Start: 2024-02-06

## 2024-01-09 RX ORDER — EPINEPHRINE 0.3 MG/.3ML
0.3 INJECTION SUBCUTANEOUS EVERY 5 MIN PRN
Status: CANCELLED | OUTPATIENT
Start: 2024-04-02

## 2024-01-09 RX ORDER — ALBUTEROL SULFATE 0.83 MG/ML
3 SOLUTION RESPIRATORY (INHALATION) AS NEEDED
Status: CANCELLED | OUTPATIENT
Start: 2024-05-28

## 2024-01-09 RX ORDER — PROCHLORPERAZINE MALEATE 10 MG
10 TABLET ORAL EVERY 6 HOURS PRN
Status: CANCELLED | OUTPATIENT
Start: 2024-02-06

## 2024-01-09 RX ORDER — DIPHENHYDRAMINE HYDROCHLORIDE 50 MG/ML
50 INJECTION INTRAMUSCULAR; INTRAVENOUS AS NEEDED
Status: CANCELLED | OUTPATIENT
Start: 2024-03-05

## 2024-01-09 RX ORDER — DIPHENHYDRAMINE HYDROCHLORIDE 50 MG/ML
50 INJECTION INTRAMUSCULAR; INTRAVENOUS AS NEEDED
Status: CANCELLED | OUTPATIENT
Start: 2024-04-02

## 2024-01-09 RX ORDER — PROCHLORPERAZINE MALEATE 10 MG
10 TABLET ORAL EVERY 6 HOURS PRN
Status: DISCONTINUED | OUTPATIENT
Start: 2024-01-09 | End: 2024-01-09 | Stop reason: HOSPADM

## 2024-01-09 RX ORDER — PROCHLORPERAZINE EDISYLATE 5 MG/ML
10 INJECTION INTRAMUSCULAR; INTRAVENOUS EVERY 6 HOURS PRN
Status: DISCONTINUED | OUTPATIENT
Start: 2024-01-09 | End: 2024-01-09 | Stop reason: HOSPADM

## 2024-01-09 RX ORDER — PROCHLORPERAZINE MALEATE 10 MG
10 TABLET ORAL EVERY 6 HOURS PRN
Status: CANCELLED | OUTPATIENT
Start: 2024-04-30

## 2024-01-09 RX ORDER — PROCHLORPERAZINE MALEATE 10 MG
10 TABLET ORAL EVERY 6 HOURS PRN
Status: CANCELLED | OUTPATIENT
Start: 2024-03-05

## 2024-01-09 RX ORDER — ALBUTEROL SULFATE 0.83 MG/ML
3 SOLUTION RESPIRATORY (INHALATION) AS NEEDED
Status: CANCELLED | OUTPATIENT
Start: 2024-04-30

## 2024-01-09 RX ORDER — EPINEPHRINE 0.3 MG/.3ML
0.3 INJECTION SUBCUTANEOUS EVERY 5 MIN PRN
Status: CANCELLED | OUTPATIENT
Start: 2024-02-06

## 2024-01-09 RX ORDER — DIPHENHYDRAMINE HYDROCHLORIDE 50 MG/ML
50 INJECTION INTRAMUSCULAR; INTRAVENOUS AS NEEDED
Status: CANCELLED | OUTPATIENT
Start: 2024-05-28

## 2024-01-09 RX ORDER — FAMOTIDINE 10 MG/ML
20 INJECTION INTRAVENOUS ONCE AS NEEDED
Status: CANCELLED | OUTPATIENT
Start: 2024-04-02

## 2024-01-09 RX ORDER — ALBUTEROL SULFATE 0.83 MG/ML
3 SOLUTION RESPIRATORY (INHALATION) AS NEEDED
Status: DISCONTINUED | OUTPATIENT
Start: 2024-01-09 | End: 2024-01-09 | Stop reason: HOSPADM

## 2024-01-09 RX ORDER — EPINEPHRINE 0.3 MG/.3ML
0.3 INJECTION SUBCUTANEOUS EVERY 5 MIN PRN
Status: CANCELLED | OUTPATIENT
Start: 2024-05-28

## 2024-01-09 RX ORDER — DIPHENHYDRAMINE HYDROCHLORIDE 50 MG/ML
50 INJECTION INTRAMUSCULAR; INTRAVENOUS AS NEEDED
Status: CANCELLED | OUTPATIENT
Start: 2024-02-06

## 2024-01-09 RX ORDER — DIPHENHYDRAMINE HYDROCHLORIDE 50 MG/ML
50 INJECTION INTRAMUSCULAR; INTRAVENOUS AS NEEDED
Status: DISCONTINUED | OUTPATIENT
Start: 2024-01-09 | End: 2024-01-09 | Stop reason: HOSPADM

## 2024-01-09 RX ORDER — PROCHLORPERAZINE MALEATE 10 MG
10 TABLET ORAL EVERY 6 HOURS PRN
Status: CANCELLED | OUTPATIENT
Start: 2024-04-02

## 2024-01-09 RX ORDER — EPINEPHRINE 0.3 MG/.3ML
0.3 INJECTION SUBCUTANEOUS EVERY 5 MIN PRN
Status: CANCELLED | OUTPATIENT
Start: 2024-03-05

## 2024-01-09 RX ORDER — PROCHLORPERAZINE EDISYLATE 5 MG/ML
10 INJECTION INTRAMUSCULAR; INTRAVENOUS EVERY 6 HOURS PRN
Status: CANCELLED | OUTPATIENT
Start: 2024-05-28

## 2024-01-09 RX ORDER — EPINEPHRINE 0.3 MG/.3ML
0.3 INJECTION SUBCUTANEOUS EVERY 5 MIN PRN
Status: CANCELLED | OUTPATIENT
Start: 2024-04-30

## 2024-01-09 RX ORDER — EPINEPHRINE 0.3 MG/.3ML
0.3 INJECTION SUBCUTANEOUS EVERY 5 MIN PRN
Status: DISCONTINUED | OUTPATIENT
Start: 2024-01-09 | End: 2024-01-09 | Stop reason: HOSPADM

## 2024-01-09 RX ORDER — FAMOTIDINE 10 MG/ML
20 INJECTION INTRAVENOUS ONCE AS NEEDED
Status: DISCONTINUED | OUTPATIENT
Start: 2024-01-09 | End: 2024-01-09 | Stop reason: HOSPADM

## 2024-01-09 RX ORDER — PROCHLORPERAZINE MALEATE 10 MG
10 TABLET ORAL EVERY 6 HOURS PRN
Status: CANCELLED | OUTPATIENT
Start: 2024-05-28

## 2024-01-09 RX ORDER — FAMOTIDINE 10 MG/ML
20 INJECTION INTRAVENOUS ONCE AS NEEDED
Status: CANCELLED | OUTPATIENT
Start: 2024-02-06

## 2024-01-09 RX ORDER — FAMOTIDINE 10 MG/ML
20 INJECTION INTRAVENOUS ONCE AS NEEDED
Status: CANCELLED | OUTPATIENT
Start: 2024-05-28

## 2024-01-09 RX ORDER — FAMOTIDINE 10 MG/ML
20 INJECTION INTRAVENOUS ONCE AS NEEDED
Status: CANCELLED | OUTPATIENT
Start: 2024-03-05

## 2024-01-09 RX ORDER — DIPHENHYDRAMINE HYDROCHLORIDE 50 MG/ML
50 INJECTION INTRAMUSCULAR; INTRAVENOUS AS NEEDED
Status: CANCELLED | OUTPATIENT
Start: 2024-04-30

## 2024-01-09 RX ADMIN — DOCETAXEL 37.5 MG: 20 INJECTION, SOLUTION, CONCENTRATE INTRAVENOUS at 13:31

## 2024-01-09 RX ADMIN — GEMCITABINE 1000 MG: 38 INJECTION, SOLUTION INTRAVENOUS at 12:33

## 2024-01-09 NOTE — PROGRESS NOTES
Robertson (size) catheter easily placed, no bleeding present  1000 mg Gemcitabine instilled after bladder was drained.  catheter clamped, pt had no difficulty retaining urine for one hour. Catheter  then unclamped, bladder emptied for approx 50 cc clear yellow urine  then instilled Docetaxel 37.5 mg and removed cath. Pt instructed to maintain chemotherapy in bladder for 2 hours. Pt tolerated well.

## 2024-01-11 ENCOUNTER — TELEPHONE (OUTPATIENT)
Dept: PRIMARY CARE | Facility: CLINIC | Age: 69
End: 2024-01-11
Payer: MEDICARE

## 2024-01-11 NOTE — TELEPHONE ENCOUNTER
Patient wanted to try a different alternative which is Ozempic or your best recommendation for weight loss.

## 2024-01-16 RX ORDER — CIPROFLOXACIN 500 MG/1
500 TABLET ORAL ONCE
Status: COMPLETED | OUTPATIENT
Start: 2024-01-18 | End: 2024-01-18

## 2024-01-16 NOTE — PROGRESS NOTES
KYLAH    Last seen - 9/21/23 and by Charleen Sutton on 1/9/24     HISTORY OF PRESENT ILLNESS:   Ravin Camacho is a 68 y.o. male who is being seen today for continue cystoscopic surveillance.    PAST MEDICAL HISTORY:  Past Medical History:   Diagnosis Date    Acute bronchitis with bronchospasm 04/05/2023    Acute otitis media 04/05/2023    Cluster headache 04/05/2023    Cough 04/05/2023    Hip pain, right 04/05/2023    Lattice degeneration of both retinas 04/05/2023    Personal history of irradiation 09/18/2022    History of radiation therapy    Pure hypercholesterolemia, unspecified     High cholesterol    Serous otitis media 04/05/2023    Stiffness of right knee, not elsewhere classified 04/05/2023    Suspected UTI 04/05/2023    UTI symptoms 07/20/2023       PAST SURGICAL HISTORY:  Past Surgical History:   Procedure Laterality Date    COLONOSCOPY  12/01/2016    Complete Colonoscopy    HAND SURGERY  12/01/2016    Hand Surgery                                                                                                                                                          LUNG LOBECTOMY  05/22/2015    Lung Lobectomy    OTHER SURGICAL HISTORY  10/31/2019    Knee replacement    OTHER SURGICAL HISTORY  09/18/2022    Epidural steroid injection    OTHER SURGICAL HISTORY  09/18/2022    Bronchoscopy        ALLERGIES:   No Known Allergies     MEDICATIONS:   Current Outpatient Medications   Medication Instructions    albuterol 90 mcg/actuation inhaler TAKE 2 PUFFS BY MOUTH EVERY 4 TO 6 HOURS AS NEEDED    Anoro Ellipta 62.5-25 mcg/actuation blister with device 1 puff, inhalation, Daily    atorvastatin (LIPITOR) 20 mg, oral, Daily    blood sugar diagnostic (Blood Glucose Test) strip 1 strip, in vitro, Daily    budesonide-glycopyr-formoterol (Breztri Aerosphere) 160-9-4.8 mcg/actuation HFA aerosol inhaler 2 puffs, inhalation, 2 times daily RT, Rinse mouth with water after use to reduce aftertaste and incidence of  "candidiasis. Do not swallow.    docusate sodium (COLACE) 100 mg, oral, 2 times daily    ergocalciferol (Vitamin D-2) 1.25 MG (58785 UT) capsule TAKE 1 CAPSULE BY MOUTH ONE TIME PER WEEK    fluticasone-umeclidin-vilanter (Trelegy Ellipta) 100-62.5-25 mcg blister with device 1 puff, inhalation, Daily, Rinse mouth with water after use to reduce aftertaste and incidence of candidiasis. Do not swallow.    glyBURIDE (Diabeta) 5 mg tablet 1 tablet, oral, Daily    lancets (Fingerstix Lancets) misc Daily    lisinopril 10 mg tablet 1 tablet, oral, Daily    losartan (Cozaar) 25 mg tablet TAKE 1 TABLET BY MOUTH EVERY DAY    meloxicam (Mobic) 15 mg tablet TAKE 1 TABLET EVERY DAY AS NEEDED    metFORMIN XR (Glucophage-XR) 500 mg 24 hr tablet TAKE 2 TABLETS TWICE A DAY    multivitamin with minerals (multivitamin with folic acid) tablet 1 tablet, oral, Daily    pioglitazone (Actos) 30 mg tablet 1 tablet, oral, Daily    tadalafil (CIALIS) 20 mg, oral, As needed, take one hour before activity    tiotropium (Spiriva Respimat) 1.25 mcg/actuation inhaler 2 puffs, inhalation, Daily    traMADol (ULTRAM) 50 mg        PHYSICAL EXAM:  There were no vitals taken for this visit.  Constitutional: Patient appears well-developed and well-nourished. No distress.    Pulmonary/Chest: Effort normal. No respiratory distress.   Abdominal: Soft, ND NT  : WNL  Musculoskeletal: Normal range of motion.    Neurological: Alert and oriented to person, place, and time.  Psychiatric: Normal mood and affect. Behavior is normal. Thought content normal.      Labs:  No results found for: \"TESTOSTERONE\"  Lab Results   Component Value Date    PSA 3.64 08/11/2023     No components found for: \"CBC\"  Lab Results   Component Value Date    CREATININE 1.20 01/05/2024     No components found for: \"TESTOTMS\"  Lab Results   Component Value Date    TESTF 64.3 10/28/2021       Discussion:  Pt is still receiving monthly maintenance of Gemcitabine/Docetaxel intravesical " instillations x 1 year (07/2024) by Charleen Sutton NP, last was on 1/9/24. He states he has 5 treatments left. Reports he has an enlarged prostate and is experiencing urinary sxs. Recommended daily dosing Cialis, explained it can help with urination and ED. Will try daily dosing Cialis. Pt denies nitrates, explained. Discussed he can titrate up to 20mg prior to sexual activity but to skip the next days daily 5mg dosage. Pt understands.      The cystoscopy was completed today due to T1 papillary urothelial carcinoma, high grade and demonstrated no tumors, stones or lesions. Cytology sent. 1 abx given to patient in clinic today. Patient instructed to follow up in 3 months for continued surveillance.    Cystoscopic surveillance schedule: q3 months until 3/2025, q6 until 3/2028, and yearly after no recurrences.    Assessment:      1. Malignant neoplasm of posterior wall of urinary bladder (CMS/HCC)  Cystoscopy          Ravin Camacho is a 68 y.o. male here for FUV     Plan:   1) Will follow up in 3 months for continue cystoscopic surveillance.  2) Will send a rx for daily dosing cialis 5mg and a refill for 20mg on demand Cialis to patients pharmacy.  3) All questions and concerns were addressed. Patient verbalizes understanding and has no other questions at this time.     Scribe Attestation:  By signing my name below, Jane CARRERA Scribe   attest that this documentation has been prepared under the direction and in the presence of Mello Frausto MD.

## 2024-01-18 ENCOUNTER — APPOINTMENT (OUTPATIENT)
Dept: LAB | Facility: LAB | Age: 69
End: 2024-01-18
Payer: MEDICARE

## 2024-01-18 ENCOUNTER — PROCEDURE VISIT (OUTPATIENT)
Dept: UROLOGY | Facility: HOSPITAL | Age: 69
End: 2024-01-18
Payer: MEDICARE

## 2024-01-18 DIAGNOSIS — C67.4 MALIGNANT NEOPLASM OF POSTERIOR WALL OF URINARY BLADDER (MULTI): Primary | ICD-10-CM

## 2024-01-18 DIAGNOSIS — Z79.2 PROPHYLACTIC ANTIBIOTIC: ICD-10-CM

## 2024-01-18 DIAGNOSIS — N52.9 ERECTILE DYSFUNCTION, UNSPECIFIED ERECTILE DYSFUNCTION TYPE: ICD-10-CM

## 2024-01-18 LAB
POC APPEARANCE, URINE: CLEAR
POC BILIRUBIN, URINE: NEGATIVE
POC BLOOD, URINE: ABNORMAL
POC COLOR, URINE: YELLOW
POC GLUCOSE, URINE: NEGATIVE MG/DL
POC KETONES, URINE: ABNORMAL MG/DL
POC LEUKOCYTES, URINE: NEGATIVE
POC NITRITE,URINE: NEGATIVE
POC PH, URINE: 5.5 PH
POC PROTEIN, URINE: ABNORMAL MG/DL
POC SPECIFIC GRAVITY, URINE: >=1.03
POC UROBILINOGEN, URINE: 0.2 EU/DL

## 2024-01-18 PROCEDURE — 88112 CYTOPATH CELL ENHANCE TECH: CPT | Mod: TC | Performed by: UROLOGY

## 2024-01-18 PROCEDURE — 2500000001 HC RX 250 WO HCPCS SELF ADMINISTERED DRUGS (ALT 637 FOR MEDICARE OP): Performed by: UROLOGY

## 2024-01-18 PROCEDURE — 88112 CYTOPATH CELL ENHANCE TECH: CPT | Performed by: PATHOLOGY

## 2024-01-18 PROCEDURE — 81003 URINALYSIS AUTO W/O SCOPE: CPT | Performed by: UROLOGY

## 2024-01-18 PROCEDURE — 52000 CYSTOURETHROSCOPY: CPT | Performed by: UROLOGY

## 2024-01-18 RX ORDER — TADALAFIL 20 MG/1
20 TABLET ORAL DAILY PRN
Qty: 30 TABLET | Refills: 2 | Status: SHIPPED | OUTPATIENT
Start: 2024-01-18

## 2024-01-18 RX ORDER — TADALAFIL 5 MG/1
5 TABLET ORAL DAILY
Qty: 90 TABLET | Refills: 3 | Status: SHIPPED | OUTPATIENT
Start: 2024-01-18 | End: 2025-01-12

## 2024-01-18 RX ADMIN — CIPROFLOXACIN 500 MG: 500 TABLET ORAL at 12:40

## 2024-01-23 LAB
LABORATORY COMMENT REPORT: NORMAL
LABORATORY COMMENT REPORT: NORMAL
PATH REPORT.FINAL DX SPEC: NORMAL
PATH REPORT.GROSS SPEC: NORMAL
PATH REPORT.RELEVANT HX SPEC: NORMAL
PATH REPORT.TOTAL CANCER: NORMAL

## 2024-01-25 ENCOUNTER — OFFICE VISIT (OUTPATIENT)
Dept: PULMONOLOGY | Facility: HOSPITAL | Age: 69
End: 2024-01-25
Payer: MEDICARE

## 2024-01-25 VITALS
WEIGHT: 251 LBS | OXYGEN SATURATION: 95 % | DIASTOLIC BLOOD PRESSURE: 73 MMHG | HEART RATE: 86 BPM | TEMPERATURE: 97.8 F | SYSTOLIC BLOOD PRESSURE: 127 MMHG | BODY MASS INDEX: 36.01 KG/M2

## 2024-01-25 DIAGNOSIS — J44.9 CHRONIC OBSTRUCTIVE PULMONARY DISEASE, UNSPECIFIED COPD TYPE (MULTI): Primary | ICD-10-CM

## 2024-01-25 DIAGNOSIS — R06.00 DYSPNEA, UNSPECIFIED TYPE: ICD-10-CM

## 2024-01-25 PROCEDURE — 3078F DIAST BP <80 MM HG: CPT | Performed by: NURSE PRACTITIONER

## 2024-01-25 PROCEDURE — 1126F AMNT PAIN NOTED NONE PRSNT: CPT | Performed by: NURSE PRACTITIONER

## 2024-01-25 PROCEDURE — 1159F MED LIST DOCD IN RCRD: CPT | Performed by: NURSE PRACTITIONER

## 2024-01-25 PROCEDURE — 99213 OFFICE O/P EST LOW 20 MIN: CPT | Performed by: NURSE PRACTITIONER

## 2024-01-25 PROCEDURE — 1036F TOBACCO NON-USER: CPT | Performed by: NURSE PRACTITIONER

## 2024-01-25 PROCEDURE — 3008F BODY MASS INDEX DOCD: CPT | Performed by: NURSE PRACTITIONER

## 2024-01-25 PROCEDURE — 4010F ACE/ARB THERAPY RXD/TAKEN: CPT | Performed by: NURSE PRACTITIONER

## 2024-01-25 PROCEDURE — 3074F SYST BP LT 130 MM HG: CPT | Performed by: NURSE PRACTITIONER

## 2024-01-25 RX ORDER — ALBUTEROL SULFATE 0.83 MG/ML
2.5 SOLUTION RESPIRATORY (INHALATION) EVERY 6 HOURS PRN
Qty: 100 ML | Refills: 2 | Status: SHIPPED | OUTPATIENT
Start: 2024-01-25 | End: 2025-01-24

## 2024-01-25 SDOH — ECONOMIC STABILITY: FOOD INSECURITY: WITHIN THE PAST 12 MONTHS, YOU WORRIED THAT YOUR FOOD WOULD RUN OUT BEFORE YOU GOT MONEY TO BUY MORE.: NEVER TRUE

## 2024-01-25 SDOH — ECONOMIC STABILITY: FOOD INSECURITY: WITHIN THE PAST 12 MONTHS, THE FOOD YOU BOUGHT JUST DIDN'T LAST AND YOU DIDN'T HAVE MONEY TO GET MORE.: NEVER TRUE

## 2024-01-25 ASSESSMENT — PATIENT HEALTH QUESTIONNAIRE - PHQ9
1. LITTLE INTEREST OR PLEASURE IN DOING THINGS: NOT AT ALL
2. FEELING DOWN, DEPRESSED OR HOPELESS: NOT AT ALL
SUM OF ALL RESPONSES TO PHQ9 QUESTIONS 1 & 2: 0

## 2024-01-25 ASSESSMENT — ENCOUNTER SYMPTOMS
DIZZINESS: 0
JOINT SWELLING: 0
BACK PAIN: 1
ABDOMINAL PAIN: 0
RHINORRHEA: 0
ARTHRALGIAS: 1
HEADACHES: 0
DIARRHEA: 0
VOICE CHANGE: 0
NAUSEA: 0
SINUS PRESSURE: 0
FATIGUE: 0
WEAKNESS: 0
FEVER: 0
VOMITING: 0
EYE PAIN: 0
NUMBNESS: 1
AGITATION: 0
MYALGIAS: 0
NERVOUS/ANXIOUS: 0
PALPITATIONS: 0

## 2024-01-25 ASSESSMENT — LIFESTYLE VARIABLES: HOW OFTEN DO YOU HAVE A DRINK CONTAINING ALCOHOL: MONTHLY OR LESS

## 2024-01-25 ASSESSMENT — PAIN SCALES - GENERAL: PAINLEVEL: 0-NO PAIN

## 2024-01-25 NOTE — PROGRESS NOTES
Patient: Ravin Camacho    [id number]  : 1955 -- AGE 68 y.o.    Provider: JAYME Maldonado-CNP     Location [facility]   Service Date: 2024              [facility]  Follow up visit note      HISTORY OF PRESENT ILLNESS       HISTORY OF PRESENT ILLNESS   Mr. Camacho is a 68 year old male former smoker with COPD and h/o of lung cancer (s/p RUL lobectomy in ) who presents today for follow-up. Last seen in clinic on 21 for a prebronch visit - 21 reoccurrence of adenocarcinoma s/p SBRT in 2021. Last seen in clinic on 10/19/23.     Since last visit he has been doing ok. He was able to get the trelegy - not sure if its working well. He has been using his rescue 3-4 x per day - usually before exercise/ before bed/ waking up in the morning.  He was able to get the nebulizer machine, but does not have the solution to go in it. He feels his breathing feels about the same. He has a clearing cough every so often. He will bring up mucous in the morning - usually clear. He has noticed some intermittent wheezing. He feels his CHAPA is about the same. He denies any SOB at rest, GERD, or CP. He has some post nasal drip and sneezing for a few days - felt like maybe a slight allergy to something. He did not feel it was a cold. Chemo for bladder cancer - done in . He was following with Dr. Holder with rad/onc - recently left . He has an upcoming appt with Dr. Bowman. He has not been watching his pulse ox - does not have one at home. He states his wife states he only snores when he is on his back. He has not had a sleep study and states he sleeps fine.     CAT today 16    10/19/23: He states things have been going pretty crappy. He has had bladder cancer twice now over the last year and a half. Currently on chemo for this. He has been using his Anoro daily - not sure how much it is helping. He has spinal stenosis so he can't play  golf anymore.  He is asking about pulmonary rehab. He is using his albuterol HFA 3-4 x a day.  He does not have a nebulizer machine. He has been on chemo for 3-4 months - he is not sure if they could have side effects. He has a slight cough - sometimes productive with clear mucous (usually more productive first thing in the morning). He will notice wheezing at times - notices when he lays down. He has CHAPA with projects around the house. He states he will work for 3-4 minutes and then take a 5 minute break. He feels he recovers quick, but has to take frequent breaks. He denies any SOB at rest, GERD, or CP.  He denies any runny nose/ nasal congestion -- maybe some very mild throat clearing, but not botheresome. He got his flu shot, RSV shot, and COVID booster last week.    CAT today 16 // ACT today 14     7/5/23: Since last visit he feels his breathing is worse. He is still using his anoro daily - states if he gets sick he ends up with a bad cough for a long time. He has CHAPA with going up the stairs/ taking the garbage out. He states he wishes the anoro worked better. He tried spiriva -- states the anoro was better. He will at times notice wheezing. He has PRN albuterol that he will use 2-3 x a day. He uses his albuterol more proactively before exercise. He denies cough except when he is sick. He denies any SOB at rest, allergies, GERD, or chest pain.   CAT today 14     5/20/21: Since last visit he started spiriva started over the last few weeks. He felt the anoro may have worked a little better, but he wants to give it a little more time. He has a little bit of a cough in the morning. He has wheezing sporadically more so when he is lying down. CHAPA is still doing better than previous. He feels he back slide a little with the inhaler switch   He denies any SOB at rest, allergies, GERD, CP, or snoring.     4/14/21: Since last visit his breathing has been doing better with weight loss. He is not sure if the anoro is  helping. He usually uses his rescue before exercise, but rarely needs it PRN. His CHAPA with exertion is much better compared to previous. He has lost 45lbs in the last 7-8 months. He previously had CHAPA with going up steps, but now is able to walk a mile before he get SOB. He has a dry cough intermittently that is rarely productive. He had wheezing previously that was especially bothersome to his wife at night. He denies any SOB at rest, allergies, GERD, CP, or snoring.     12/3/18: Mr. Camacho reports a URI in October 2018 for which he was given a Z-pack. He has been having CHAPA for 1-2 years now.  denies fevers, chills, or nights sweats  +CHAPA  denies shortness of breath at rest,   mMRC Dyspnea Score = 2  +Cough, occasional wheeze  denies hemoptysis, or stridor  denies chest pain, orthopnea, or lower extremity edema  +weight gain  denies nausea, vomiting, abdominal pain, odynophagia, dysphagia, heartburn, anorexia, or weight loss  denies sinus congestion, post-nasal drip, epistaxis, or hoarseness of voice  denies snoring, apneas, excessive daytime somnolence, napping    Inhalers / Nebulized medications / Oxygen:    Anoro   Albuterol    Since the last visit there have been no changes to the past medical history, past surgical history, social history, or family history.      ALLERGIES AND MEDICATIONS     ALLERGIES  No Known Allergies    MEDICATIONS  Current Outpatient Medications   Medication Sig Dispense Refill    albuterol 90 mcg/actuation inhaler TAKE 2 PUFFS BY MOUTH EVERY 4 TO 6 HOURS AS NEEDED 18 g 11    atorvastatin (Lipitor) 20 mg tablet TAKE 1 TABLET BY MOUTH EVERY DAY 90 tablet 3    ergocalciferol (Vitamin D-2) 1.25 MG (28977 UT) capsule TAKE 1 CAPSULE BY MOUTH ONE TIME PER WEEK 12 capsule 2    fluticasone-umeclidin-vilanter (Trelegy Ellipta) 100-62.5-25 mcg blister with device Inhale 1 puff once daily. Rinse mouth with water after use to reduce aftertaste and incidence of candidiasis. Do not swallow. 1  each 3    losartan (Cozaar) 25 mg tablet TAKE 1 TABLET BY MOUTH EVERY  tablet 2    meloxicam (Mobic) 15 mg tablet TAKE 1 TABLET EVERY DAY AS NEEDED 30 tablet 3    metFORMIN XR (Glucophage-XR) 500 mg 24 hr tablet TAKE 2 TABLETS TWICE A  tablet 3    multivitamin with minerals (multivitamin with folic acid) tablet Take 1 tablet by mouth once daily.      tadalafil (Cialis) 5 mg tablet Take 1 tablet (5 mg) by mouth once daily. 90 tablet 3    tadalafil 20 mg tablet Take 1 tablet (20 mg) by mouth once daily as needed for erectile dysfunction. take one hour before activity 30 tablet 2    Anoro Ellipta 62.5-25 mcg/actuation blister with device Inhale 1 puff once daily.      blood sugar diagnostic (Blood Glucose Test) strip 1 strip by in vitro route once daily.      budesonide-glycopyr-formoterol (Breztri Aerosphere) 160-9-4.8 mcg/actuation HFA aerosol inhaler Inhale 2 puffs 2 times a day. Rinse mouth with water after use to reduce aftertaste and incidence of candidiasis. Do not swallow. (Patient not taking: Reported on 1/25/2024) 10.7 g 3    docusate sodium (Colace) 100 mg capsule Take 1 capsule (100 mg) by mouth 2 times a day.      glyBURIDE (Diabeta) 5 mg tablet Take 1 tablet (5 mg) by mouth once daily.      lancets (Fingerstix Lancets) misc once daily.      lisinopril 10 mg tablet Take 1 tablet (10 mg) by mouth once daily.      pioglitazone (Actos) 30 mg tablet Take 1 tablet (30 mg) by mouth once daily.      tiotropium (Spiriva Respimat) 1.25 mcg/actuation inhaler Inhale 2 puffs once daily.      traMADol (Ultram) 50 mg tablet 1 tablet (50 mg).       No current facility-administered medications for this visit.         PAST HISTORY     PAST MEDICAL HISTORY  - HLD   - HTN   - lung cancer -  (s/p RUL lobectomy in 1999) who presents today for follow-up. Reoccurrence of adenocarcinoma s/p SBRT in 8/2021.  - DMII   - OA   - bladder cancer - s/p surgery in 3/2022- then s/p 6 weeks of BCG then quarterly. Kidney stone  s/p surgery 3/2023- this delayed his BCG. He thinks it reoccurred related to delayed BCG -- to start a year of chemo here soon for 12 months       PAST SURGICAL HISTORY  Past Surgical History:   Procedure Laterality Date    COLONOSCOPY  12/01/2016    Complete Colonoscopy    HAND SURGERY  12/01/2016    Hand Surgery                                                                                                                                                          LUNG LOBECTOMY  05/22/2015    Lung Lobectomy    OTHER SURGICAL HISTORY  10/31/2019    Knee replacement    OTHER SURGICAL HISTORY  09/18/2022    Epidural steroid injection    OTHER SURGICAL HISTORY  09/18/2022    Bronchoscopy       IMMUNIZATION HISTORY  Immunization History   Administered Date(s) Administered    Flu vaccine, quadrivalent, high-dose, preservative free, age 65y+ (FLUZONE) 10/26/2022    Influenza, High Dose Seasonal, Preservative Free 09/23/2020, 09/29/2021    Influenza, seasonal, injectable 10/12/2023    Moderna SARS-CoV-2 Vaccination 10/12/2023    Pfizer COVID-19 vaccine, bivalent, age 12 years and older (30 mcg/0.3 mL) 10/26/2022    Pfizer Purple Cap SARS-CoV-2 03/03/2021, 03/24/2021, 10/04/2021    Pneumococcal conjugate vaccine, 13-valent (PREVNAR 13) 09/23/2020    Pneumococcal conjugate vaccine, 20-valent (PREVNAR 20) 07/19/2023    Pneumococcal polysaccharide vaccine, 23-valent, age 2 years and older (PNEUMOVAX 23) 12/03/2018    RSV, 60 Years And Older (AREXVY) 10/12/2023    Tdap vaccine, age 7 year and older (BOOSTRIX) 04/23/2015    Zoster vaccine, recombinant, adult (SHINGRIX) 06/28/2021, 10/18/2021    Zoster, live 04/26/2015       SOCIAL HISTORY / OCCUPATIONAL/ENVIRONMENTAL HISTORY   Tobacco: Former smoker > quit 1999, smoked 2 ppd prior since age 16 (~45 pack years)   Occupation: former  / EMT,       FAMILY HISTORY  Family History   Problem Relation Name Age of Onset    Hypertension Mother      Lung cancer Father      Cancer  Other Multiple Family Members       +cancer   No family history of lung diseases    RESULTS/DATA     Pulmonary Function Test Results       - 11/18/2015 - FEV1/FVC = 45% (LLN= 66%) FEV1 1.48 L (41% predicted)/ FVC 3.65 (81%)   - 2/26/19 - FEV1/FVC 0.48/ FEV1 1.65 (48%)/ FVC 3.65 (81%)  - 4/14/21: FEV1/FVC 0.43/ FEV1 1.75 (52%)/ FVC 4.09 (93%)/ TLC 6.79 (95%)/ DLCO 74%   - 10/19/23 - FEV1/FVC 0.36/FEV1 1.24 (38%)/ FVC 3.45 (80%)/ TLC 6.70 (94%)/ DLCO 59%     6MWT:    - 2/26/19 - 515m 94 -> 87% on RA, PADMA 2   - 10/19/23 - 351m 95 -> 89% on RA, PADMA 4     O2 desat: 2/26/19 95% on RA at rest -> 88% on RA with exertion, 96% on 2L with exertion     Chest Radiograph     No results found for this or any previous visit from the past 2000 days.      Chest CT Scan     --- 2/10/2017 -> s/p RUL lobectomy, emphysema, small bilateral peripheral lung nodules  --- CT chest 3/11/19 - Stable small upper lobe pulmonary nodules. Two new 2-3 mm pulmonary densities in the ASHLEY. post surgical changes consistent with a right upper lobectomy.   - 4/26/21: Significant interval enlargement of a now 18 mm spiculated left upper lobe pulmonary nodule abutting the leftward aspect of the mediastinum.  --- 6/24/21 - Minimal increase in dominant medial left upper lobe mediastinal pleural based pulmonary nodule, which corresponds to intense hypermetabolic activity on recent PET-CT, concerning for primary lung neoplasm. Tissue sampling is recommended. Postsurgical changes status post right upper lobectomy without local evidence of recurrence. No new pulmonary nodules. Multiple subcentimeter mediastinal lymph nodes as described above,  which appears stable compared to chest CT dated 04/26/2021. No new zita disease within chest. Background mild emphysematous changes. Mild to moderate coronary artery calcifications are seen. The study is not optimized for evaluation of coronary arteries. Additional stable chronic findings as above.  --- 2/8/22-  Chest/abdomen/ pelvis: CHEST: Status post right upper lobectomy, with unchanged right lung base  pleural thickening and calcification which may be sequela of treatment or prior pleural inflammatory process. No pleural effusion. No pleural nodularity. The previously noted left upper lung malignancy is significantly smaller, difficult to even measure, about 5 mm oblong nodular focus remains in the area of treated lesion, which may be scar. Continued  attention suggested. No evidence of metastatic disease in the chest.  ABDOMEN-PELVIS: There is an enhancing polypoid 3.5 cm mass in the left posterior bladder wall overlying the left UVJ, with some calcification along the superficial margin. This is highly suspicious for a bladder malignancy. Follow-up with Urology is recommended. No hydronephrosis or hydroureter at this time. No pelvic adenopathy. There is an 11 mm left para-aortic node noted which is indeterminate and if has not substantially changed in size from the prior PET-CT. No other abdominal adenopathy. Some small bilateral adrenal nodules are unchanged, cannot be characterized on this exam, most likely small adenomas. Unchanged hepatic hypodensities, the larger of which can be characterized as simple cysts, some are too small to characterize. Bilateral nonobstructing nephrolithiasis and other incidental  nonacute findings as above.  -- 8/30/22- Postoperative changes compatible with right upper lobectomy and post radiation changes within the left upper lobe without evidence of local recurrence. Interval complete resolution/resection of the posterior bladder  mass. Interval development of a solid 2 mm right middle lobe and a solid 3 mm left lower lobe pulmonary nodules. Attention on follow-up is recommended. Pancreatic head and uncinate process cystic lesions are unchanged  compared to CT chest 02/07/2022. A pancreatic MRI with MRCP is recommended for further characterization. Nonspecific left periaortic  lymphadenopathy without significant metabolic activity on prior PET-CT. Attention on follow-up is recommended. Unchanged left adrenal gland nodules without significant metabolic activity on prior PET-CT. Attention on follow-up is recommended. Bilateral nonobstructing nephrolithiasis. Prostatomegaly. Correlate with serum PSA.  --- 2/27/23 - A few mediastinal lymph nodes have enlarged compared to 6 months ago, the dominant node from 8 mm to 11 mm short axis in the subcarinal region. Metastatic disease is possible. Consider PET/CT for further evaluation. Previously described new pulmonary nodules have resolved. No new suspicious pulmonary nodules.  --- 6/5/23 - Status post right pneumonectomy. [ RUL lobectomy ]. Bilateral lung micronodules, stable since 2/23. Nonobstructing right renal calculus.    PET:   - 5/14/21 - Noncalcified, paramediastinal pulmonary lesion in the ASHLEY demonstrates abnormal hypermetabolic activity and is highly concerning for a neoplastic process. No evidence of hypermetabolic mediastinal lymphadenopathy or  metastatic disease in the rest of body. Postsurgical changes consistent with right upper lobectomy, with  no evidence of hypermetabolic recurrent disease along the surgical margins. Hypermetabolic soft tissue focus in the left parotid gland, which may represent a Warthin's tumor. Ultrasound is recommended for further characterization.  - 6/21/23 -Postsurgical changes in the right upper lobe without PET evidence of recurrent disease. Redemonstration of faint FDG avid soft tissue thickening in the left upper lobe, favored to represent post radiation changes. No PET  evidence of local recurrence. No PET evidence of locoregional zita or distant metastasis. FDG avid focus in the posterior apical prostate gland, nonspecific, correlate clinically. Grossly stable of previously seen FDG avid lesion in the left parotid gland, favored to represent a pleomorphic adenoma or Warthin's tumor  - 12/19/23 -   Post treatment change seen in the lungs. Post radiation change seen in the left lung apex with slightly more coalescent soft tissue possibly representing radiation fibrosis but progressive tumor is also consideration.. Follow-up is advised to exclude any local progression of disease. PET-CT may be of diagnostic benefit to evaluate vascular calcification.      Echocardiogram     Echocardiogram - 8/11/23 -   CONCLUSIONS:  1. Left ventricular systolic function is normal with a 65-70% estimated ejection fraction.  RA normal size. The right ventricle is mildly enlarged - There is normal right ventricular global systolic function.       REVIEW OF SYSTEMS     REVIEW OF SYSTEMS  Review of Systems   Constitutional:  Negative for fatigue and fever.   HENT:  Negative for congestion, postnasal drip, rhinorrhea, sinus pressure and voice change.    Eyes:  Negative for pain and visual disturbance.   Cardiovascular:  Negative for chest pain, palpitations and leg swelling.   Gastrointestinal:  Negative for abdominal pain, diarrhea, nausea and vomiting.   Endocrine: Negative for cold intolerance and heat intolerance.   Musculoskeletal:  Positive for arthralgias and back pain. Negative for joint swelling and myalgias.   Skin:  Negative for rash.   Neurological:  Positive for numbness. Negative for dizziness, weakness and headaches.   Psychiatric/Behavioral:  Negative for agitation. The patient is not nervous/anxious.          PHYSICAL EXAM     VITAL SIGNS: /73 (BP Location: Left arm, Patient Position: Sitting)   Pulse 86   Temp 36.6 °C (97.8 °F) (Temporal)   Wt 114 kg (251 lb)   SpO2 95%   BMI 36.01 kg/m²      CURRENT WEIGHT: [unfilled]  BMI: [unfilled]  PREVIOUS WEIGHTS:  Wt Readings from Last 3 Encounters:   01/25/24 114 kg (251 lb)   10/19/23 110 kg (242 lb 3.2 oz)   07/19/23 104 kg (230 lb)       Physical Exam  Vitals reviewed.   Constitutional:       General: He is not in acute distress.     Appearance: Normal appearance.  He is not ill-appearing or toxic-appearing.   HENT:      Head: Normocephalic.      Nose: No rhinorrhea.   Cardiovascular:      Rate and Rhythm: Normal rate and regular rhythm.      Heart sounds: Normal heart sounds.   Pulmonary:      Effort: Pulmonary effort is normal. No respiratory distress.      Breath sounds: Normal breath sounds. No stridor.   Abdominal:      General: Abdomen is flat.   Musculoskeletal:         General: Normal range of motion.      Right lower leg: No edema.      Left lower leg: No edema.   Skin:     General: Skin is warm and dry.      Nails: There is no clubbing.   Neurological:      General: No focal deficit present.      Mental Status: He is alert and oriented to person, place, and time.   Psychiatric:         Mood and Affect: Mood normal.         Behavior: Behavior normal.         Judgment: Judgment normal.       ASSESSMENT/PLAN     1. COPD: Previous PFTs with severe, but now improved to moderate obstruction. Dyspnea on exertion much improved. 2019 6MWT - 89% on RA with ambulation. Echo with normal EF - RV slightly enlarged.   - continue trelegy - 1 inhalation daily   - continue albuterol HFA 2 puffs or albuterol nebulizers every 4-6 hours as needed   - heart healthy diet   - continue to great job getting active!!   - discussed pulmonary rehab - wants to work out   - get pulse ox to check at home   - will get nocturnal oximetry     2. Lung cancer: CT in 3/2019 with no evidence of reoccurrence. CT in 4/2021 with new ASHLEY nodule + adenocarcinoma s/p SBRT in 8/2021.   - continue to monitor - following with Rad/ Onc --> has PET scan coming up in March     3. Bladder cancer:   - currently on chemo

## 2024-01-25 NOTE — PATIENT INSTRUCTIONS
1. COPD: Previous PFTs with severe, but now improved to moderate obstruction. Dyspnea on exertion much improved. 2019 6MWT - 89% on RA with ambulation. Echo with normal EF - RV slightly enlarged.   - continue trelegy - 1 inhalation daily   - continue albuterol HFA 2 puffs or albuterol nebulizers every 4-6 hours as needed   - heart healthy diet   - continue to great job getting active!!   - discussed pulmonary rehab - wants to work out   - get pulse ox to check at home   - will get nocturnal oximetry     2. Lung cancer: CT in 3/2019 with no evidence of reoccurrence. CT in 4/2021 with new ASHLEY nodule + adenocarcinoma s/p SBRT in 8/2021.   - continue to monitor - following with Rad/ Onc --> has PET scan coming up in March     3. Bladder cancer:   - currently on chemo    Thank you for visiting the Pulmonary clinic today!   Return to clinic  6 months  or sooner if needed   Suly Maria CNP  My office -  [phone]- Meghna is my  and Pallavi is my nurse.   Radiology scheduling [phone]   Appointment scheduling [phone]   Pulmonary function testing - [phone]

## 2024-01-29 ENCOUNTER — TELEPHONE (OUTPATIENT)
Dept: PRIMARY CARE | Facility: CLINIC | Age: 69
End: 2024-01-29
Payer: MEDICARE

## 2024-01-29 NOTE — TELEPHONE ENCOUNTER
Patient wanted to try a different alternative which is Ozempic or your best recommendation for weight loss.    Ozempic, or any other option

## 2024-01-30 DIAGNOSIS — M17.12 UNILATERAL PRIMARY OSTEOARTHRITIS, LEFT KNEE: ICD-10-CM

## 2024-01-30 RX ORDER — MELOXICAM 15 MG/1
TABLET ORAL
Qty: 30 TABLET | Refills: 3 | Status: SHIPPED | OUTPATIENT
Start: 2024-01-30 | End: 2024-05-28

## 2024-02-02 ENCOUNTER — LAB (OUTPATIENT)
Dept: LAB | Facility: LAB | Age: 69
End: 2024-02-02
Payer: MEDICARE

## 2024-02-02 DIAGNOSIS — C67.9 MALIGNANT NEOPLASM OF URINARY BLADDER, UNSPECIFIED SITE (MULTI): ICD-10-CM

## 2024-02-02 LAB
ALBUMIN SERPL BCP-MCNC: 4.3 G/DL (ref 3.4–5)
ANION GAP SERPL CALC-SCNC: 15 MMOL/L (ref 10–20)
APPEARANCE UR: ABNORMAL
BACTERIA #/AREA URNS AUTO: ABNORMAL /HPF
BASOPHILS # BLD AUTO: 0.04 X10*3/UL (ref 0–0.1)
BASOPHILS NFR BLD AUTO: 0.4 %
BILIRUB UR STRIP.AUTO-MCNC: NEGATIVE MG/DL
BUN SERPL-MCNC: 24 MG/DL (ref 6–23)
CALCIUM SERPL-MCNC: 10 MG/DL (ref 8.6–10.6)
CHLORIDE SERPL-SCNC: 105 MMOL/L (ref 98–107)
CO2 SERPL-SCNC: 28 MMOL/L (ref 21–32)
COLOR UR: ABNORMAL
CREAT SERPL-MCNC: 1.15 MG/DL (ref 0.5–1.3)
EGFRCR SERPLBLD CKD-EPI 2021: 69 ML/MIN/1.73M*2
EOSINOPHIL # BLD AUTO: 0.11 X10*3/UL (ref 0–0.7)
EOSINOPHIL NFR BLD AUTO: 1.2 %
ERYTHROCYTE [DISTWIDTH] IN BLOOD BY AUTOMATED COUNT: 12.3 % (ref 11.5–14.5)
GLUCOSE SERPL-MCNC: 100 MG/DL (ref 74–99)
GLUCOSE UR STRIP.AUTO-MCNC: NEGATIVE MG/DL
HCT VFR BLD AUTO: 57.7 % (ref 41–52)
HGB BLD-MCNC: 19.1 G/DL (ref 13.5–17.5)
IMM GRANULOCYTES # BLD AUTO: 0.06 X10*3/UL (ref 0–0.7)
IMM GRANULOCYTES NFR BLD AUTO: 0.6 % (ref 0–0.9)
KETONES UR STRIP.AUTO-MCNC: ABNORMAL MG/DL
LEUKOCYTE ESTERASE UR QL STRIP.AUTO: ABNORMAL
LYMPHOCYTES # BLD AUTO: 1.53 X10*3/UL (ref 1.2–4.8)
LYMPHOCYTES NFR BLD AUTO: 16.3 %
MCH RBC QN AUTO: 33 PG (ref 26–34)
MCHC RBC AUTO-ENTMCNC: 33.1 G/DL (ref 32–36)
MCV RBC AUTO: 100 FL (ref 80–100)
MONOCYTES # BLD AUTO: 0.82 X10*3/UL (ref 0.1–1)
MONOCYTES NFR BLD AUTO: 8.8 %
MUCOUS THREADS #/AREA URNS AUTO: ABNORMAL /LPF
NEUTROPHILS # BLD AUTO: 6.81 X10*3/UL (ref 1.2–7.7)
NEUTROPHILS NFR BLD AUTO: 72.7 %
NITRITE UR QL STRIP.AUTO: NEGATIVE
NRBC BLD-RTO: 0 /100 WBCS (ref 0–0)
PH UR STRIP.AUTO: 5 [PH]
PHOSPHATE SERPL-MCNC: 4 MG/DL (ref 2.5–4.9)
PLATELET # BLD AUTO: 213 X10*3/UL (ref 150–450)
POTASSIUM SERPL-SCNC: 5.1 MMOL/L (ref 3.5–5.3)
PROT UR STRIP.AUTO-MCNC: ABNORMAL MG/DL
RBC # BLD AUTO: 5.79 X10*6/UL (ref 4.5–5.9)
RBC # UR STRIP.AUTO: NEGATIVE /UL
RBC #/AREA URNS AUTO: ABNORMAL /HPF
SODIUM SERPL-SCNC: 143 MMOL/L (ref 136–145)
SP GR UR STRIP.AUTO: 1.02
UROBILINOGEN UR STRIP.AUTO-MCNC: 2 MG/DL
WBC # BLD AUTO: 9.4 X10*3/UL (ref 4.4–11.3)
WBC #/AREA URNS AUTO: ABNORMAL /HPF

## 2024-02-02 PROCEDURE — 85025 COMPLETE CBC W/AUTO DIFF WBC: CPT

## 2024-02-02 PROCEDURE — 81001 URINALYSIS AUTO W/SCOPE: CPT

## 2024-02-02 PROCEDURE — 36415 COLL VENOUS BLD VENIPUNCTURE: CPT

## 2024-02-02 PROCEDURE — 80069 RENAL FUNCTION PANEL: CPT

## 2024-02-04 DIAGNOSIS — E55.9 VITAMIN D DEFICIENCY, UNSPECIFIED: ICD-10-CM

## 2024-02-05 RX ORDER — ERGOCALCIFEROL 1.25 MG/1
CAPSULE ORAL
Qty: 12 CAPSULE | Refills: 2 | Status: SHIPPED | OUTPATIENT
Start: 2024-02-05

## 2024-02-05 NOTE — PROGRESS NOTES
Subjective   Patient ID: Ravin Camacho is a 68 y.o. male who presents for monthly maintenance Gemcitabine/Docetaxel intravesical instillation. (16 F coude)     HPI  History of lung CA, bladder mass with surgical pathology on 03/07/22 demonstrating invasive papillary urothelial carcinoma, high grade, s/p TURBT with Dr Mcknight on 03/01/2023.     BCG with maintenance cycle 1 completed on Oct 17th 2022. Renal US on 1/10/23 demonstrated bilateral nonobstructing nephrolithiasis and cystoscopy completed on 2/9/23 demonstrated 1 small bladder tumor to the left posterior wall. Surgical pathology on 3/1/23 demonstrating non-invasive papillary urothelial carcinoma, high grade.     Surveillance cystoscopy with Dr. Frausto 01/18/2024    Review of Systems  All other systems have been reviewed and are negative for complaint.    Objective   Physical Exam  General: Well developed, well nourished, alert and cooperative, appears in no acute distress  Eyes: Non-injected conjunctiva, sclera clear, no proptosis  Cardiac: Extremities are warm and well perfused. No edema, cyanosis or pallor.   Lungs: Breathing is easy, non-labored. Speaking in clear and complete sentences. Normal diaphragmatic movement.  MSK: Ambulatory with steady gait, unassisted  Neuro: alert and oriented to person, place and time  Psych: Demonstrates good judgement and reason, without hallucinations, abnormal affect or abnormal behaviors.  Skin: no obvious lesions, no rashes.    Assessment/Plan        Pts current cysto schedule: q3 months until 03/2025, q6 until 03/2028, and yearly after no recurrences.    Today we discussed in great detail risks, benefits, and possible adverse reactions to Gemcitabine/Docetaxel intravesical Chemo instillations. Gemcitabine 1000 mg and Docetaxel 37.5 mg will be instilled intravesically weekly for 6 weeks with a maximum of 2 cycles. Avoid drinking fluids 4 hours prior to instillation    Pretreatment:   Patient will complete CBC,  RFP, Urinalysis, and Urine Culture every Friday prior to Tuesday Bladder instillation.  Hold treatment if patient experiences urinary frequency or urgency lasting > 24 hour, suprapubic pain, grade 3 dysuria, grade 2 gross hematuria (without clots) or grade 3 hematuria (with clots) lasting > 48 hours, or platelets are < 75,000/mm3    After administration:  Contact office for new or worsening signs and symptoms of urinary frequency, urgency, hematuria, dysuria, or myalgia.  Void in a seated position to avoid splashing of urine  Flush toilet twice after first void  Wash hands thoroughly with soap and water after urinating  Drink plenty of water in order to flush the bladder    Patient will return to [facility] for monthly maintenance Gemcitabine/Docetaxel intravesical instillations x 1 year (07/2024) All questions and concerns were addressed. Patient verbalizes understanding and has no other questions at this time.     Surveillance cystoscopies q3 months until 3/2025, q6 until 3/2028, and yearly after no recurrences.     Charleen Sutton-- ROGERS DELACRUZ  Office Phone: [phone]

## 2024-02-06 ENCOUNTER — PROCEDURE VISIT (OUTPATIENT)
Dept: UROLOGY | Facility: HOSPITAL | Age: 69
End: 2024-02-06
Payer: MEDICARE

## 2024-02-06 DIAGNOSIS — C67.9 MALIGNANT NEOPLASM OF URINARY BLADDER, UNSPECIFIED SITE (MULTI): Primary | ICD-10-CM

## 2024-02-06 PROCEDURE — 99214 OFFICE O/P EST MOD 30 MIN: CPT | Performed by: NURSE PRACTITIONER

## 2024-02-06 PROCEDURE — 51720 TREATMENT OF BLADDER LESION: CPT | Performed by: NURSE PRACTITIONER

## 2024-02-06 PROCEDURE — 2500000004 HC RX 250 GENERAL PHARMACY W/ HCPCS (ALT 636 FOR OP/ED): Performed by: UROLOGY

## 2024-02-06 RX ORDER — DIPHENHYDRAMINE HYDROCHLORIDE 50 MG/ML
50 INJECTION INTRAMUSCULAR; INTRAVENOUS AS NEEDED
Status: DISCONTINUED | OUTPATIENT
Start: 2024-02-06 | End: 2024-02-06 | Stop reason: HOSPADM

## 2024-02-06 RX ORDER — ALBUTEROL SULFATE 0.83 MG/ML
3 SOLUTION RESPIRATORY (INHALATION) AS NEEDED
Status: DISCONTINUED | OUTPATIENT
Start: 2024-02-06 | End: 2024-02-06 | Stop reason: HOSPADM

## 2024-02-06 RX ORDER — PROCHLORPERAZINE EDISYLATE 5 MG/ML
10 INJECTION INTRAMUSCULAR; INTRAVENOUS EVERY 6 HOURS PRN
Status: DISCONTINUED | OUTPATIENT
Start: 2024-02-06 | End: 2024-02-06 | Stop reason: HOSPADM

## 2024-02-06 RX ORDER — PROCHLORPERAZINE MALEATE 10 MG
10 TABLET ORAL EVERY 6 HOURS PRN
Status: DISCONTINUED | OUTPATIENT
Start: 2024-02-06 | End: 2024-02-06 | Stop reason: HOSPADM

## 2024-02-06 RX ORDER — FAMOTIDINE 10 MG/ML
20 INJECTION INTRAVENOUS ONCE AS NEEDED
Status: DISCONTINUED | OUTPATIENT
Start: 2024-02-06 | End: 2024-02-06 | Stop reason: HOSPADM

## 2024-02-06 RX ORDER — EPINEPHRINE 0.3 MG/.3ML
0.3 INJECTION SUBCUTANEOUS EVERY 5 MIN PRN
Status: DISCONTINUED | OUTPATIENT
Start: 2024-02-06 | End: 2024-02-06 | Stop reason: HOSPADM

## 2024-02-06 RX ADMIN — GEMCITABINE 1000 MG: 38 INJECTION, SOLUTION INTRAVENOUS at 11:56

## 2024-02-06 RX ADMIN — DOCETAXEL 37.5 MG: 20 INJECTION, SOLUTION INTRAVENOUS at 13:02

## 2024-02-06 NOTE — PROGRESS NOTES
[place] 14F catheter easily placed, no bleeding present  1000 mg Gemcitabine 76.32 ml NS instilled after bladder was drained.  catheter clamped, pt had no difficulty retaining urine for one hour.   Catheter unclamped, bladder emptied for approx 150 cc clear yellow urine  then instilled Docetaxel 37.5 mg/50 ml NS. Removed cathether  Pt tolerated well. Pt aware to maintain in bladder for 2 hours.

## 2024-02-08 DIAGNOSIS — I10 ESSENTIAL (PRIMARY) HYPERTENSION: ICD-10-CM

## 2024-02-08 RX ORDER — LOSARTAN POTASSIUM 25 MG/1
TABLET ORAL
Qty: 100 TABLET | Refills: 2 | Status: SHIPPED | OUTPATIENT
Start: 2024-02-08

## 2024-02-13 DIAGNOSIS — J44.9 CHRONIC OBSTRUCTIVE PULMONARY DISEASE, UNSPECIFIED COPD TYPE (MULTI): ICD-10-CM

## 2024-02-14 RX ORDER — FLUTICASONE FUROATE, UMECLIDINIUM BROMIDE AND VILANTEROL TRIFENATATE 100; 62.5; 25 UG/1; UG/1; UG/1
1 POWDER RESPIRATORY (INHALATION) DAILY
Qty: 60 EACH | Refills: 3 | Status: SHIPPED | OUTPATIENT
Start: 2024-02-14

## 2024-03-01 ENCOUNTER — LAB (OUTPATIENT)
Dept: LAB | Facility: LAB | Age: 69
End: 2024-03-01
Payer: MEDICARE

## 2024-03-01 DIAGNOSIS — C67.9 MALIGNANT NEOPLASM OF URINARY BLADDER, UNSPECIFIED SITE (MULTI): ICD-10-CM

## 2024-03-01 LAB
ALBUMIN SERPL BCP-MCNC: 4.4 G/DL (ref 3.4–5)
ANION GAP SERPL CALC-SCNC: 16 MMOL/L (ref 10–20)
APPEARANCE UR: ABNORMAL
BACTERIA #/AREA URNS AUTO: ABNORMAL /HPF
BASOPHILS # BLD AUTO: 0.06 X10*3/UL (ref 0–0.1)
BASOPHILS NFR BLD AUTO: 0.7 %
BILIRUB UR STRIP.AUTO-MCNC: NEGATIVE MG/DL
BUN SERPL-MCNC: 19 MG/DL (ref 6–23)
CALCIUM SERPL-MCNC: 9.8 MG/DL (ref 8.6–10.6)
CHLORIDE SERPL-SCNC: 105 MMOL/L (ref 98–107)
CO2 SERPL-SCNC: 28 MMOL/L (ref 21–32)
COLOR UR: YELLOW
CREAT SERPL-MCNC: 1.14 MG/DL (ref 0.5–1.3)
EGFRCR SERPLBLD CKD-EPI 2021: 70 ML/MIN/1.73M*2
EOSINOPHIL # BLD AUTO: 0.07 X10*3/UL (ref 0–0.7)
EOSINOPHIL NFR BLD AUTO: 0.9 %
ERYTHROCYTE [DISTWIDTH] IN BLOOD BY AUTOMATED COUNT: 12.5 % (ref 11.5–14.5)
GLUCOSE SERPL-MCNC: 141 MG/DL (ref 74–99)
GLUCOSE UR STRIP.AUTO-MCNC: NORMAL MG/DL
HCT VFR BLD AUTO: 59.9 % (ref 41–52)
HGB BLD-MCNC: 19.5 G/DL (ref 13.5–17.5)
IMM GRANULOCYTES # BLD AUTO: 0.06 X10*3/UL (ref 0–0.7)
IMM GRANULOCYTES NFR BLD AUTO: 0.7 % (ref 0–0.9)
KETONES UR STRIP.AUTO-MCNC: NEGATIVE MG/DL
LEUKOCYTE ESTERASE UR QL STRIP.AUTO: NEGATIVE
LYMPHOCYTES # BLD AUTO: 1.5 X10*3/UL (ref 1.2–4.8)
LYMPHOCYTES NFR BLD AUTO: 18.3 %
MCH RBC QN AUTO: 32.4 PG (ref 26–34)
MCHC RBC AUTO-ENTMCNC: 32.6 G/DL (ref 32–36)
MCV RBC AUTO: 100 FL (ref 80–100)
MONOCYTES # BLD AUTO: 0.72 X10*3/UL (ref 0.1–1)
MONOCYTES NFR BLD AUTO: 8.8 %
MUCOUS THREADS #/AREA URNS AUTO: ABNORMAL /LPF
NEUTROPHILS # BLD AUTO: 5.78 X10*3/UL (ref 1.2–7.7)
NEUTROPHILS NFR BLD AUTO: 70.6 %
NITRITE UR QL STRIP.AUTO: NEGATIVE
NRBC BLD-RTO: 0 /100 WBCS (ref 0–0)
PH UR STRIP.AUTO: 5.5 [PH]
PHOSPHATE SERPL-MCNC: 3.8 MG/DL (ref 2.5–4.9)
PLATELET # BLD AUTO: 223 X10*3/UL (ref 150–450)
POTASSIUM SERPL-SCNC: 5.2 MMOL/L (ref 3.5–5.3)
PROT UR STRIP.AUTO-MCNC: ABNORMAL MG/DL
RBC # BLD AUTO: 6.01 X10*6/UL (ref 4.5–5.9)
RBC # UR STRIP.AUTO: NEGATIVE /UL
RBC #/AREA URNS AUTO: ABNORMAL /HPF
SODIUM SERPL-SCNC: 144 MMOL/L (ref 136–145)
SP GR UR STRIP.AUTO: 1.03
UROBILINOGEN UR STRIP.AUTO-MCNC: ABNORMAL MG/DL
WBC # BLD AUTO: 8.2 X10*3/UL (ref 4.4–11.3)
WBC #/AREA URNS AUTO: ABNORMAL /HPF

## 2024-03-01 PROCEDURE — 80069 RENAL FUNCTION PANEL: CPT

## 2024-03-01 PROCEDURE — 36415 COLL VENOUS BLD VENIPUNCTURE: CPT

## 2024-03-01 PROCEDURE — 81001 URINALYSIS AUTO W/SCOPE: CPT

## 2024-03-01 PROCEDURE — 87086 URINE CULTURE/COLONY COUNT: CPT

## 2024-03-01 PROCEDURE — 85025 COMPLETE CBC W/AUTO DIFF WBC: CPT

## 2024-03-03 LAB — BACTERIA UR CULT: NO GROWTH

## 2024-03-05 ENCOUNTER — PROCEDURE VISIT (OUTPATIENT)
Dept: UROLOGY | Facility: HOSPITAL | Age: 69
End: 2024-03-05
Payer: MEDICARE

## 2024-03-05 DIAGNOSIS — C67.9 MALIGNANT NEOPLASM OF URINARY BLADDER, UNSPECIFIED SITE (MULTI): ICD-10-CM

## 2024-03-05 DIAGNOSIS — C67.4 MALIGNANT NEOPLASM OF POSTERIOR WALL OF URINARY BLADDER (MULTI): Primary | ICD-10-CM

## 2024-03-05 PROCEDURE — 51702 INSERT TEMP BLADDER CATH: CPT | Performed by: NURSE PRACTITIONER

## 2024-03-05 PROCEDURE — 99214 OFFICE O/P EST MOD 30 MIN: CPT | Performed by: NURSE PRACTITIONER

## 2024-03-05 PROCEDURE — 2500000004 HC RX 250 GENERAL PHARMACY W/ HCPCS (ALT 636 FOR OP/ED): Performed by: UROLOGY

## 2024-03-05 PROCEDURE — 51720 TREATMENT OF BLADDER LESION: CPT | Performed by: NURSE PRACTITIONER

## 2024-03-05 RX ORDER — EPINEPHRINE 0.3 MG/.3ML
0.3 INJECTION SUBCUTANEOUS EVERY 5 MIN PRN
Status: DISCONTINUED | OUTPATIENT
Start: 2024-03-05 | End: 2024-03-05 | Stop reason: HOSPADM

## 2024-03-05 RX ORDER — ALBUTEROL SULFATE 0.83 MG/ML
3 SOLUTION RESPIRATORY (INHALATION) AS NEEDED
Status: DISCONTINUED | OUTPATIENT
Start: 2024-03-05 | End: 2024-03-05 | Stop reason: HOSPADM

## 2024-03-05 RX ORDER — DIPHENHYDRAMINE HYDROCHLORIDE 50 MG/ML
50 INJECTION INTRAMUSCULAR; INTRAVENOUS AS NEEDED
Status: DISCONTINUED | OUTPATIENT
Start: 2024-03-05 | End: 2024-03-05 | Stop reason: HOSPADM

## 2024-03-05 RX ORDER — FAMOTIDINE 10 MG/ML
20 INJECTION INTRAVENOUS ONCE AS NEEDED
Status: DISCONTINUED | OUTPATIENT
Start: 2024-03-05 | End: 2024-03-05 | Stop reason: HOSPADM

## 2024-03-05 RX ORDER — PROCHLORPERAZINE EDISYLATE 5 MG/ML
10 INJECTION INTRAMUSCULAR; INTRAVENOUS EVERY 6 HOURS PRN
Status: DISCONTINUED | OUTPATIENT
Start: 2024-03-05 | End: 2024-03-05 | Stop reason: HOSPADM

## 2024-03-05 RX ORDER — PROCHLORPERAZINE MALEATE 10 MG
10 TABLET ORAL EVERY 6 HOURS PRN
Status: DISCONTINUED | OUTPATIENT
Start: 2024-03-05 | End: 2024-03-05 | Stop reason: HOSPADM

## 2024-03-05 RX ADMIN — GEMCITABINE 1000 MG: 38 INJECTION, SOLUTION INTRAVENOUS at 12:12

## 2024-03-05 RX ADMIN — DOCETAXEL ANHYDROUS 38 MG: 10 INJECTION, SOLUTION INTRAVENOUS at 13:31

## 2024-03-05 NOTE — PROGRESS NOTES
Subjective   Patient ID: Ravin Camacho is a 68 y.o. male who presents for monthly maintenance Gemcitabine/Docetaxel intravesical instillation. (16 F coude)     HPI  History of lung CA, bladder mass with surgical pathology on 03/07/22 demonstrating invasive papillary urothelial carcinoma, high grade, s/p TURBT with Dr Mcknight on 03/01/2023.     BCG with maintenance cycle 1 completed on Oct 17th 2022. Renal US on 1/10/23 demonstrated bilateral nonobstructing nephrolithiasis and cystoscopy completed on 2/9/23 demonstrated 1 small bladder tumor to the left posterior wall. Surgical pathology on 3/1/23 demonstrating non-invasive papillary urothelial carcinoma, high grade.     Surveillance cystoscopy with Dr. Frausto 01/18/2024    Review of Systems  All other systems have been reviewed and are negative for complaint.    Objective   Physical Exam  General: Well developed, well nourished, alert and cooperative, appears in no acute distress  Eyes: Non-injected conjunctiva, sclera clear, no proptosis  Cardiac: Extremities are warm and well perfused. No edema, cyanosis or pallor.   Lungs: Breathing is easy, non-labored. Speaking in clear and complete sentences. Normal diaphragmatic movement.  MSK: Ambulatory with steady gait, unassisted  Neuro: alert and oriented to person, place and time  Psych: Demonstrates good judgement and reason, without hallucinations, abnormal affect or abnormal behaviors.  Skin: no obvious lesions, no rashes.    Assessment/Plan        Pts current cysto schedule: q3 months until 03/2025, q6 until 03/2028, and yearly after no recurrences.    Today we discussed in great detail risks, benefits, and possible adverse reactions to Gemcitabine/Docetaxel intravesical Chemo instillations. Gemcitabine 1000 mg and Docetaxel 37.5 mg will be instilled intravesically weekly for 6 weeks with a maximum of 2 cycles. Avoid drinking fluids 4 hours prior to instillation    Pretreatment:   Patient will complete CBC,  RFP, Urinalysis, and Urine Culture every Friday prior to Tuesday Bladder instillation.  Hold treatment if patient experiences urinary frequency or urgency lasting > 24 hour, suprapubic pain, grade 3 dysuria, grade 2 gross hematuria (without clots) or grade 3 hematuria (with clots) lasting > 48 hours, or platelets are < 75,000/mm3    After administration:  Contact office for new or worsening signs and symptoms of urinary frequency, urgency, hematuria, dysuria, or myalgia.  Void in a seated position to avoid splashing of urine  Flush toilet twice after first void  Wash hands thoroughly with soap and water after urinating  Drink plenty of water in order to flush the bladder    Patient will return to [facility] for monthly maintenance Gemcitabine/Docetaxel intravesical instillations x 1 year (07/2024) All questions and concerns were addressed. Patient verbalizes understanding and has no other questions at this time.     Surveillance cystoscopies q3 months until 3/2025, q6 until 3/2028, and yearly after no recurrences.     Charleen Sutton-- ROGERS DELACRUZ  Office Phone: [phone]

## 2024-03-05 NOTE — PROGRESS NOTES
Pt here for C1D1 monthly [place]/Docetax.  [place] 1 gm instilled via newly placed weston cath at 1200.  Pt's weston bag clamped and pt aware to hold until 1300.  Tolerated well.   Pt then drained of [place]/produced urine and Docetaxel 37.5 mg instilled via same weston cath.  West[place] removed and pt verbalized understanding on the 2 hr hold time at home.  Discharged without incident.

## 2024-03-20 ENCOUNTER — HOSPITAL ENCOUNTER (OUTPATIENT)
Dept: RADIOLOGY | Facility: HOSPITAL | Age: 69
Discharge: HOME | End: 2024-03-20
Payer: MEDICARE

## 2024-03-20 DIAGNOSIS — C34.12 MALIGNANT NEOPLASM OF UPPER LOBE OF LEFT LUNG (MULTI): ICD-10-CM

## 2024-03-20 LAB — GLUCOSE BLD MANUAL STRIP-MCNC: 167 MG/DL (ref 74–99)

## 2024-03-20 PROCEDURE — 3430000001 HC RX 343 DIAGNOSTIC RADIOPHARMACEUTICALS

## 2024-03-20 PROCEDURE — A9552 F18 FDG: HCPCS

## 2024-03-20 PROCEDURE — 82947 ASSAY GLUCOSE BLOOD QUANT: CPT

## 2024-03-20 PROCEDURE — 78815 PET IMAGE W/CT SKULL-THIGH: CPT | Mod: PET TUMOR SUBSQ TX STRATEGY | Performed by: STUDENT IN AN ORGANIZED HEALTH CARE EDUCATION/TRAINING PROGRAM

## 2024-03-20 PROCEDURE — 78815 PET IMAGE W/CT SKULL-THIGH: CPT | Mod: PS

## 2024-03-20 RX ORDER — FLUDEOXYGLUCOSE F 18 200 MCI/ML
13.1 INJECTION, SOLUTION INTRAVENOUS
Status: COMPLETED | OUTPATIENT
Start: 2024-03-20 | End: 2024-03-20

## 2024-03-20 RX ADMIN — FLUDEOXYGLUCOSE F 18 13.1 MILLICURIE: 200 INJECTION, SOLUTION INTRAVENOUS at 08:52

## 2024-03-22 ENCOUNTER — TELEPHONE (OUTPATIENT)
Dept: RADIATION ONCOLOGY | Facility: HOSPITAL | Age: 69
End: 2024-03-22
Payer: MEDICARE

## 2024-03-22 ENCOUNTER — APPOINTMENT (OUTPATIENT)
Dept: RADIATION ONCOLOGY | Facility: CLINIC | Age: 69
End: 2024-03-22
Payer: MEDICARE

## 2024-03-22 NOTE — TELEPHONE ENCOUNTER
Called pt to remind of appointment on 3/25/2024 at 11:00 Pt answered and will be present.    Pt is aware that the appointment is a phone/virtual visit, pt. understands that he/she doesn't have to show up in office.     Nurse and OT evaluation  PT  2 X per week for 3 weeks  PT 1 X per week X 2 weeks  Agree to above recommendations and plan on behalf of PCP Dr Sepulveda.  Lucy Mohan MD on 2/17/2023 at 5:10 PM

## 2024-03-25 ENCOUNTER — HOSPITAL ENCOUNTER (OUTPATIENT)
Dept: RADIATION ONCOLOGY | Facility: HOSPITAL | Age: 69
Setting detail: RADIATION/ONCOLOGY SERIES
Discharge: HOME | End: 2024-03-25
Payer: MEDICARE

## 2024-03-25 DIAGNOSIS — C34.90 MALIGNANT NEOPLASM OF LUNG, UNSPECIFIED LATERALITY, UNSPECIFIED PART OF LUNG (MULTI): Primary | ICD-10-CM

## 2024-03-25 PROCEDURE — 99214 OFFICE O/P EST MOD 30 MIN: CPT | Performed by: NURSE PRACTITIONER

## 2024-03-25 ASSESSMENT — ENCOUNTER SYMPTOMS
DIAPHORESIS: 0
CHILLS: 0
UNEXPECTED WEIGHT CHANGE: 0
APNEA: 0
DYSURIA: 0
HEMATURIA: 0
APPETITE CHANGE: 0
FATIGUE: 0
WHEEZING: 0
FEVER: 0
STRIDOR: 0
GASTROINTESTINAL NEGATIVE: 1
CARDIOVASCULAR NEGATIVE: 1
ACTIVITY CHANGE: 0
CHEST TIGHTNESS: 0
DIFFICULTY URINATING: 0
FLANK PAIN: 0
CHOKING: 0
MUSCULOSKELETAL NEGATIVE: 1
PSYCHIATRIC NEGATIVE: 1
COUGH: 1
HEMATOLOGIC/LYMPHATIC NEGATIVE: 1
SHORTNESS OF BREATH: 1
NEUROLOGICAL NEGATIVE: 1

## 2024-03-25 NOTE — PROGRESS NOTES
Patient ID: [id number]     Ravin Camacho is a 68 y.o. male who was previously seen at the [facility] Department of Radiation Oncology for NSCLC.  He is s/p RUL lobectomy 1999 for NSCLC, now with stage 1A2 [id number] (by PET) 1.8 cm adenoca of ASHLEY poor PFTs > Definitive SBRT 50 Gy 5 fx completed 8/6/21.     History of presenting illness    Telehealth visit with Ravin Camacho today. Patient is a 68 y.o. male who presents today for follow up 2 years and 7 months s/p SBRT to the ASHLEY. Patient is undergoing systemic therapy with Dr. Frausto for Bladder cancer. Treatments are monthly with last treatment scheduled for June of this year. Energy poor. Appetite good. Weight stable. No significant changes in breathing. He endorses SOB and cough at baseline. No fevers, back pain or chest pain. Follows with pulmonologist regularly. Using inhalers as prescribed. Has nebulizer but has not used. No oxygen requirements.  No neurological complaints. Denies urinary symptoms. Takes 5mg Cialis daily.    Review of systems:  Review of Systems   Constitutional:  Negative for activity change, appetite change, chills, diaphoresis, fatigue, fever and unexpected weight change.   HENT: Negative.     Respiratory:  Positive for cough and shortness of breath. Negative for apnea, choking, chest tightness, wheezing and stridor.    Cardiovascular: Negative.    Gastrointestinal: Negative.    Genitourinary:  Negative for decreased urine volume, difficulty urinating, dysuria, flank pain and hematuria.   Musculoskeletal: Negative.    Skin: Negative.    Neurological: Negative.    Hematological: Negative.    Psychiatric/Behavioral: Negative.         Past Medical history  He  has a past surgical history that includes Lung lobectomy (05/22/2015); Hand surgery (12/01/2016); Colonoscopy (12/01/2016); Other surgical history (10/31/2019); Other surgical history (09/18/2022); and Other surgical history  (09/18/2022).      Radiology results:     NM PET CT bone skull base to mid thigh 03/20/2024    Narrative  Interpreted By:  Aubrie Bhatt  and Inocente Haley  STUDY:  NM PET CT SKULL BASE TO MID THIGH;  3/20/2024 10:22 am    INDICATION:  Signs/Symptoms:Follow up NSCLC s/p RUL lobectomy 1999 and ASHLEY  definitive SBRT 2021, recommended PET on last CT 12/23. Patient also  has history of bladder mass demonstrated invasive papillary  urothelial carcinoma high grade status post TURBT on March 2023,  completed BCG, now being considered for adjuvant chemotherapy.    COMPARISON:  CT chest without contrast on 12/18/2023  PET-CT on 06/21/2023    ACCESSION NUMBER(S):  [id number]    ORDERING CLINICIAN:  NAKUL LAWSON    TECHNIQUE:  DIVISION OF NUCLEAR MEDICINE  POSITRON EMISSION TOMOGRAPHY (PET-CT)    The patient received an intravenous dose of 13.1 mCi of Fluorine-18  fluorodeoxyglucose (FDG).  Positron emission tomographic (PET) images  from mid thigh to skull base were then acquired after a one hour  delay. Also acquired was a contemporaneous low dose non-contrast CT  scan performed for attenuation correction of PET images and anatomic  localization.  The PET and CT images were digitally fused for  display.  All images were acquired on a combined PET-CT scanner unit.  Some areas of FDG accumulation may be described in standardized  uptake value (SUV) units.    CODING:  Subsequent Treatment Strategy (PS)    CALIBRATION:  Dose Injection-to-Scan Interval (mins): 62 min  Mediastinal bloodpool SUV (normal 1.5-2.5): 2.5  Blood glucose: 167 mg/dL    FINDINGS:  HEAD AND NECK:  *No evidence of focal FDG avid lesion in the partially visualized  brain parenchyma, noting that evaluation is limited because of the  expected physiologic diffuse FDG uptake in the brain. *Again seen,  FDG avid lesion in the left parotid gland with SUV max of 6.2, as  compared to 6.7 seen on prior PET. *No FDG avid cervical  lymphadenopathy is present. *No  evidence of paranasal sinus disease.  *Thyroid gland is unremarkable    CHEST:  * No focal FDG avid lesion is seen in the lung parenchyma.  * No evidence of FDG avid mediastinal, hilar or axillary  lymphadenopathy. * Postsurgical changes of right upper lobe  lobectomy, without abnormal FDG uptake in the postsurgical bed to  suggest disease recurrence. Post radiation changes seen within the  left upper lobe, with mild FDG avidity with SUV max of 2.4.    ABDOMEN AND PELVIS:  * No FDG avid lymphadenopathy in the abdomen or pelvis.  * Physiologic radiotracer uptake is present in the liver and spleen  with excretion into the bowel loops and the genitourinary tract. Few  liver cysts *Unremarkable bilateral adrenal glands  *There is similar focal FDG activity within the posterior apical  prostate gland, when compared to prior PET.    MUSCULOSKELETAL:  *No concerning FDG avid bone lesion throughout axial and appendicular  skeleton to suggest osseous metastasis.    Impression  1. Status post right upper lobectomy without PET evidence of  recurrent disease in the postsurgical bed.  2. Mild FDG activity within the left upper lobe, likely representing  post radiation changes, attention on follow-up study.  3. No PET evidence of zita or distant metastasis.  4. Similar FDG avid focus in the posterior apical prostate gland,  nonspecific. Correlate clinically.  5. 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    I personally reviewed the image(s) / study and agree with the  findings and interpretation as stated. This study was interpreted at  [facility].    Signed by: Aubrie Bhatt 3/20/2024 8:28 PM  Dictation workstation:   [id number]       Plan:  Assessment/Plan     68 year old male 2 years and 7 months s/p SBRT to the ASHLEY. Patient is in his usual state of health with no acute complaints related to  treatment. PET CT done on 3/20/24. Scan shows RT changes with no evidence of zita or distance mets. Continues systemic therapy and follow up with Dr. Frausto for bladder cancer as scheduled. Continue follow up with pulmonology and use inhalers as prescribed. Patient to return to clinic in 6 months with CT of the chest prior. Instructed to call with any questions or concerns.       I performed this visit using real- time telehealth tools , including an audio/video or telephone connection between Ravin Camacho  , who is in their home and Ethel Oneal CNP at [facility].

## 2024-03-29 ENCOUNTER — LAB (OUTPATIENT)
Dept: LAB | Facility: LAB | Age: 69
End: 2024-03-29
Payer: MEDICARE

## 2024-03-29 DIAGNOSIS — C67.9 MALIGNANT NEOPLASM OF URINARY BLADDER, UNSPECIFIED SITE (MULTI): ICD-10-CM

## 2024-03-29 LAB
ALBUMIN SERPL BCP-MCNC: 4.2 G/DL (ref 3.4–5)
ANION GAP SERPL CALC-SCNC: 14 MMOL/L (ref 10–20)
APPEARANCE UR: ABNORMAL
BASOPHILS # BLD AUTO: 0.04 X10*3/UL (ref 0–0.1)
BASOPHILS NFR BLD AUTO: 0.5 %
BILIRUB UR STRIP.AUTO-MCNC: NEGATIVE MG/DL
BUN SERPL-MCNC: 16 MG/DL (ref 6–23)
CALCIUM SERPL-MCNC: 9.7 MG/DL (ref 8.6–10.6)
CHLORIDE SERPL-SCNC: 104 MMOL/L (ref 98–107)
CO2 SERPL-SCNC: 29 MMOL/L (ref 21–32)
COLOR UR: YELLOW
CREAT SERPL-MCNC: 1.01 MG/DL (ref 0.5–1.3)
EGFRCR SERPLBLD CKD-EPI 2021: 81 ML/MIN/1.73M*2
EOSINOPHIL # BLD AUTO: 0.09 X10*3/UL (ref 0–0.7)
EOSINOPHIL NFR BLD AUTO: 1.1 %
ERYTHROCYTE [DISTWIDTH] IN BLOOD BY AUTOMATED COUNT: 12.8 % (ref 11.5–14.5)
GLUCOSE SERPL-MCNC: 122 MG/DL (ref 74–99)
GLUCOSE UR STRIP.AUTO-MCNC: NORMAL MG/DL
HCT VFR BLD AUTO: 59.6 % (ref 41–52)
HGB BLD-MCNC: 19.2 G/DL (ref 13.5–17.5)
IMM GRANULOCYTES # BLD AUTO: 0.04 X10*3/UL (ref 0–0.7)
IMM GRANULOCYTES NFR BLD AUTO: 0.5 % (ref 0–0.9)
KETONES UR STRIP.AUTO-MCNC: NEGATIVE MG/DL
LEUKOCYTE ESTERASE UR QL STRIP.AUTO: NEGATIVE
LYMPHOCYTES # BLD AUTO: 1.5 X10*3/UL (ref 1.2–4.8)
LYMPHOCYTES NFR BLD AUTO: 19 %
MCH RBC QN AUTO: 32.7 PG (ref 26–34)
MCHC RBC AUTO-ENTMCNC: 32.2 G/DL (ref 32–36)
MCV RBC AUTO: 101 FL (ref 80–100)
MONOCYTES # BLD AUTO: 0.68 X10*3/UL (ref 0.1–1)
MONOCYTES NFR BLD AUTO: 8.6 %
MUCOUS THREADS #/AREA URNS AUTO: NORMAL /LPF
NEUTROPHILS # BLD AUTO: 5.54 X10*3/UL (ref 1.2–7.7)
NEUTROPHILS NFR BLD AUTO: 70.3 %
NITRITE UR QL STRIP.AUTO: NEGATIVE
NRBC BLD-RTO: 0 /100 WBCS (ref 0–0)
PH UR STRIP.AUTO: 5.5 [PH]
PHOSPHATE SERPL-MCNC: 3.8 MG/DL (ref 2.5–4.9)
PLATELET # BLD AUTO: 227 X10*3/UL (ref 150–450)
POTASSIUM SERPL-SCNC: 4.9 MMOL/L (ref 3.5–5.3)
PROT UR STRIP.AUTO-MCNC: ABNORMAL MG/DL
RBC # BLD AUTO: 5.88 X10*6/UL (ref 4.5–5.9)
RBC # UR STRIP.AUTO: NEGATIVE /UL
RBC #/AREA URNS AUTO: NORMAL /HPF
SODIUM SERPL-SCNC: 142 MMOL/L (ref 136–145)
SP GR UR STRIP.AUTO: 1.02
UROBILINOGEN UR STRIP.AUTO-MCNC: NORMAL MG/DL
WBC # BLD AUTO: 7.9 X10*3/UL (ref 4.4–11.3)
WBC #/AREA URNS AUTO: NORMAL /HPF

## 2024-03-29 PROCEDURE — 81001 URINALYSIS AUTO W/SCOPE: CPT

## 2024-03-29 PROCEDURE — 87086 URINE CULTURE/COLONY COUNT: CPT

## 2024-03-29 PROCEDURE — 85025 COMPLETE CBC W/AUTO DIFF WBC: CPT

## 2024-03-29 PROCEDURE — 36415 COLL VENOUS BLD VENIPUNCTURE: CPT

## 2024-03-29 PROCEDURE — 80069 RENAL FUNCTION PANEL: CPT

## 2024-03-31 LAB — BACTERIA UR CULT: NO GROWTH

## 2024-04-01 NOTE — PROGRESS NOTES
Subjective   Patient ID: Ravin Camacho is a 69 y.o. male who presents for monthly maintenance Gemcitabine/Docetaxel intravesical instillation. (16 F coude)     HPI  History of lung CA, bladder mass with surgical pathology on 03/07/22 demonstrating invasive papillary urothelial carcinoma, high grade, s/p TURBT with Dr Mcknight on 03/01/2023.     BCG with maintenance cycle 1 completed on Oct 17th 2022. Renal US on 1/10/23 demonstrated bilateral nonobstructing nephrolithiasis and cystoscopy completed on 2/9/23 demonstrated 1 small bladder tumor to the left posterior wall. Surgical pathology on 3/1/23 demonstrating non-invasive papillary urothelial carcinoma, high grade.     Surveillance cystoscopy with Dr. Frausto 01/18/2024    Review of Systems  All other systems have been reviewed and are negative for complaint.    Objective   Physical Exam  General: Well developed, well nourished, alert and cooperative, appears in no acute distress  Eyes: Non-injected conjunctiva, sclera clear, no proptosis  Cardiac: Extremities are warm and well perfused. No edema, cyanosis or pallor.   Lungs: Breathing is easy, non-labored. Speaking in clear and complete sentences. Normal diaphragmatic movement.  MSK: Ambulatory with steady gait, unassisted  Neuro: alert and oriented to person, place and time  Psych: Demonstrates good judgement and reason, without hallucinations, abnormal affect or abnormal behaviors.  Skin: no obvious lesions, no rashes.    Assessment/Plan        Pts current cysto schedule: q3 months until 03/2025, q6 until 03/2028, and yearly after no recurrences.    Today we discussed in great detail risks, benefits, and possible adverse reactions to Gemcitabine/Docetaxel intravesical Chemo instillations. Gemcitabine 1000 mg and Docetaxel 37.5 mg will be instilled intravesically weekly for 6 weeks with a maximum of 2 cycles. Avoid drinking fluids 4 hours prior to instillation    Pretreatment:   Patient will complete CBC,  RFP, Urinalysis, and Urine Culture every Friday prior to Tuesday Bladder instillation.  Hold treatment if patient experiences urinary frequency or urgency lasting > 24 hour, suprapubic pain, grade 3 dysuria, grade 2 gross hematuria (without clots) or grade 3 hematuria (with clots) lasting > 48 hours, or platelets are < 75,000/mm3    After administration:  Contact office for new or worsening signs and symptoms of urinary frequency, urgency, hematuria, dysuria, or myalgia.  Void in a seated position to avoid splashing of urine  Flush toilet twice after first void  Wash hands thoroughly with soap and water after urinating  Drink plenty of water in order to flush the bladder    Patient will return to [facility] for monthly maintenance Gemcitabine/Docetaxel intravesical instillations x 1 year (07/2024) All questions and concerns were addressed. Patient verbalizes understanding and has no other questions at this time.     Surveillance cystoscopies q3 months until 3/2025, q6 until 3/2028, and yearly after no recurrences.   Cystoscopy with Dr. Frausto 05/02/2024    Charleen Sutton-- ROGERS DELACRUZ  Office Phone: [phone]

## 2024-04-02 ENCOUNTER — PROCEDURE VISIT (OUTPATIENT)
Dept: UROLOGY | Facility: HOSPITAL | Age: 69
End: 2024-04-02
Payer: MEDICARE

## 2024-04-02 DIAGNOSIS — C67.9 MALIGNANT NEOPLASM OF URINARY BLADDER, UNSPECIFIED SITE (MULTI): ICD-10-CM

## 2024-04-02 DIAGNOSIS — C67.4 MALIGNANT NEOPLASM OF POSTERIOR WALL OF URINARY BLADDER (MULTI): Primary | ICD-10-CM

## 2024-04-02 PROCEDURE — 2500000004 HC RX 250 GENERAL PHARMACY W/ HCPCS (ALT 636 FOR OP/ED): Performed by: UROLOGY

## 2024-04-02 PROCEDURE — 51720 TREATMENT OF BLADDER LESION: CPT | Performed by: NURSE PRACTITIONER

## 2024-04-02 RX ORDER — PROCHLORPERAZINE MALEATE 10 MG
10 TABLET ORAL EVERY 6 HOURS PRN
Status: DISCONTINUED | OUTPATIENT
Start: 2024-04-02 | End: 2024-04-02 | Stop reason: HOSPADM

## 2024-04-02 RX ORDER — PROCHLORPERAZINE EDISYLATE 5 MG/ML
10 INJECTION INTRAMUSCULAR; INTRAVENOUS EVERY 6 HOURS PRN
Status: DISCONTINUED | OUTPATIENT
Start: 2024-04-02 | End: 2024-04-02 | Stop reason: HOSPADM

## 2024-04-02 RX ORDER — ALBUTEROL SULFATE 0.83 MG/ML
3 SOLUTION RESPIRATORY (INHALATION) AS NEEDED
Status: DISCONTINUED | OUTPATIENT
Start: 2024-04-02 | End: 2024-04-02 | Stop reason: HOSPADM

## 2024-04-02 RX ORDER — EPINEPHRINE 0.3 MG/.3ML
0.3 INJECTION SUBCUTANEOUS EVERY 5 MIN PRN
Status: DISCONTINUED | OUTPATIENT
Start: 2024-04-02 | End: 2024-04-02 | Stop reason: HOSPADM

## 2024-04-02 RX ORDER — FAMOTIDINE 10 MG/ML
20 INJECTION INTRAVENOUS ONCE AS NEEDED
Status: DISCONTINUED | OUTPATIENT
Start: 2024-04-02 | End: 2024-04-02 | Stop reason: HOSPADM

## 2024-04-02 RX ORDER — DIPHENHYDRAMINE HYDROCHLORIDE 50 MG/ML
50 INJECTION INTRAMUSCULAR; INTRAVENOUS AS NEEDED
Status: DISCONTINUED | OUTPATIENT
Start: 2024-04-02 | End: 2024-04-02 | Stop reason: HOSPADM

## 2024-04-02 RX ADMIN — DOCETAXEL 38 MG: 10 INJECTION, SOLUTION INTRAVENOUS at 13:17

## 2024-04-02 RX ADMIN — GEMCITABINE 1000 MG: 38 INJECTION, SOLUTION INTRAVENOUS at 11:57

## 2024-04-02 NOTE — PROGRESS NOTES
Pt here for C2D1 monthly [place]/Docetax.  [place] 1 gm instilled via newly placed weston cath at 1200.  Pt's weston bag clamped and pt aware to hold until 1300.  Tolerated well.   Pt then drained of [place]/produced urine and Docetaxel 37.5 mg instilled via same weston cath.  [place] removed and pt verbalized understanding on the 2 hr hold time at home.  Discharged without incident.

## 2024-04-18 ENCOUNTER — HOSPITAL ENCOUNTER (OUTPATIENT)
Dept: RADIOLOGY | Facility: CLINIC | Age: 69
Discharge: HOME | End: 2024-04-18
Payer: MEDICARE

## 2024-04-18 DIAGNOSIS — C67.4 MALIGNANT NEOPLASM OF POSTERIOR WALL OF URINARY BLADDER (MULTI): ICD-10-CM

## 2024-04-18 PROCEDURE — 76770 US EXAM ABDO BACK WALL COMP: CPT | Performed by: STUDENT IN AN ORGANIZED HEALTH CARE EDUCATION/TRAINING PROGRAM

## 2024-04-18 PROCEDURE — 76770 US EXAM ABDO BACK WALL COMP: CPT

## 2024-04-24 ENCOUNTER — OFFICE VISIT (OUTPATIENT)
Dept: OPHTHALMOLOGY | Facility: CLINIC | Age: 69
End: 2024-04-24
Payer: MEDICARE

## 2024-04-24 DIAGNOSIS — H52.4 HYPEROPIA OF BOTH EYES WITH ASTIGMATISM AND PRESBYOPIA: ICD-10-CM

## 2024-04-24 DIAGNOSIS — H52.03 HYPEROPIA OF BOTH EYES WITH ASTIGMATISM AND PRESBYOPIA: ICD-10-CM

## 2024-04-24 DIAGNOSIS — H11.121 CONJUNCTIVAL CONCRETIONS OF RIGHT EYE: ICD-10-CM

## 2024-04-24 DIAGNOSIS — H25.13 NUCLEAR SCLEROSIS OF BOTH EYES: ICD-10-CM

## 2024-04-24 DIAGNOSIS — H52.203 HYPEROPIA OF BOTH EYES WITH ASTIGMATISM AND PRESBYOPIA: ICD-10-CM

## 2024-04-24 DIAGNOSIS — E11.9 DIABETES MELLITUS TYPE 2 WITHOUT RETINOPATHY (MULTI): Primary | ICD-10-CM

## 2024-04-24 PROCEDURE — 92014 COMPRE OPH EXAM EST PT 1/>: CPT | Performed by: OPTOMETRIST

## 2024-04-24 PROCEDURE — 92015 DETERMINE REFRACTIVE STATE: CPT | Performed by: OPTOMETRIST

## 2024-04-24 ASSESSMENT — CONF VISUAL FIELD
OD_SUPERIOR_TEMPORAL_RESTRICTION: 0
OS_SUPERIOR_NASAL_RESTRICTION: 0
OD_NORMAL: 1
OS_INFERIOR_NASAL_RESTRICTION: 0
OS_NORMAL: 1
OD_SUPERIOR_NASAL_RESTRICTION: 0
OD_INFERIOR_TEMPORAL_RESTRICTION: 0
OS_INFERIOR_TEMPORAL_RESTRICTION: 0
OS_SUPERIOR_TEMPORAL_RESTRICTION: 0
OD_INFERIOR_NASAL_RESTRICTION: 0

## 2024-04-24 ASSESSMENT — TONOMETRY
OS_IOP_MMHG: 15
IOP_METHOD: TONOPEN
OD_IOP_MMHG: 15

## 2024-04-24 ASSESSMENT — REFRACTION_MANIFEST
OD_ADD: +2.50
OS_ADD: +2.50
OD_CYLINDER: -0.25
OD_SPHERE: +2.50
OS_SPHERE: +2.25
OD_AXIS: 090
OS_CYLINDER: SPHERE

## 2024-04-24 ASSESSMENT — SLIT LAMP EXAM - LIDS
COMMENTS: 1+ MGD, DERMATOCHALASIS
COMMENTS: 1+ MGD, DERMATOCHALASIS

## 2024-04-24 ASSESSMENT — ENCOUNTER SYMPTOMS
HEMATOLOGIC/LYMPHATIC NEGATIVE: 0
ALLERGIC/IMMUNOLOGIC NEGATIVE: 0
PSYCHIATRIC NEGATIVE: 0
NEUROLOGICAL NEGATIVE: 0
RESPIRATORY NEGATIVE: 0
CARDIOVASCULAR NEGATIVE: 0
EYES NEGATIVE: 0
GASTROINTESTINAL NEGATIVE: 0
CONSTITUTIONAL NEGATIVE: 0
ENDOCRINE NEGATIVE: 0
MUSCULOSKELETAL NEGATIVE: 0

## 2024-04-24 ASSESSMENT — VISUAL ACUITY
OD_CC: 20/20
METHOD: SNELLEN - LINEAR
CORRECTION_TYPE: GLASSES
OS_CC: 20/20
OS_CC+: -1

## 2024-04-24 ASSESSMENT — REFRACTION_WEARINGRX
OS_CYLINDER: SPHER
OS_SPHERE: +1.75
OS_ADD: +2.50
OD_SPHERE: +2.25
OD_ADD: +2.50
OD_AXIS: 090
OD_CYLINDER: -0.50

## 2024-04-24 ASSESSMENT — EXTERNAL EXAM - LEFT EYE: OS_EXAM: NORMAL

## 2024-04-24 ASSESSMENT — EXTERNAL EXAM - RIGHT EYE: OD_EXAM: NORMAL

## 2024-04-24 ASSESSMENT — CUP TO DISC RATIO
OS_RATIO: .2, NO NVD
OD_RATIO: .2, NO NVD

## 2024-04-24 NOTE — PROGRESS NOTES
A spectacle prescription was given at the patient`s request. Minimal change.    No retinopathy. I discussed the value of good blood sugar control under your management and annual eye exams.     Lattice degeneration of the retina. The patient was asked to return to our clinic or seek out eye care ASAP if new flashes of light or floaters are noted     Cataracts and will monitor. [facility] corrects to 20/20 OD and OS.     The patient was asked to return to our clinic in one year or sooner if ocular or vision changes occur. Manifest refraction (MR) DFE

## 2024-04-30 ENCOUNTER — TELEMEDICINE (OUTPATIENT)
Dept: UROLOGY | Facility: CLINIC | Age: 69
End: 2024-04-30
Payer: MEDICARE

## 2024-04-30 DIAGNOSIS — N20.0 CALCULUS OF KIDNEY: Primary | ICD-10-CM

## 2024-04-30 PROCEDURE — 99213 OFFICE O/P EST LOW 20 MIN: CPT | Performed by: UROLOGY

## 2024-04-30 PROCEDURE — 3008F BODY MASS INDEX DOCD: CPT | Performed by: UROLOGY

## 2024-04-30 PROCEDURE — G2211 COMPLEX E/M VISIT ADD ON: HCPCS | Performed by: UROLOGY

## 2024-04-30 PROCEDURE — 4010F ACE/ARB THERAPY RXD/TAKEN: CPT | Performed by: UROLOGY

## 2024-04-30 PROCEDURE — 1159F MED LIST DOCD IN RCRD: CPT | Performed by: UROLOGY

## 2024-04-30 NOTE — PROGRESS NOTES
Subjective   This visit was completed via telemedicine. All issues as below were discussed and addressed but no physical exam was performed unless allowed by visual confirmation. If it was felt that the patient should be evaluated in clinic, then they were directed there. Patient verbally consented to visit.    Ravin Camacho is a 69 y.o. male with a history of bladder cancer and nephrolithiasis s/p left ULLS 05/23/2022 and second stage left ULLS 06/13/2022. He presents today for a follow up visit to review renal US.         Past Medical History:   Diagnosis Date    Acute bronchitis with bronchospasm 04/05/2023    Acute otitis media 04/05/2023    Cluster headache 04/05/2023    Cough 04/05/2023    Hip pain, right 04/05/2023    Lattice degeneration of both retinas 04/05/2023    Personal history of irradiation 09/18/2022    History of radiation therapy    Pure hypercholesterolemia, unspecified     High cholesterol    Serous otitis media 04/05/2023    Stiffness of right knee, not elsewhere classified 04/05/2023    Suspected UTI 04/05/2023    UTI symptoms 07/20/2023     Past Surgical History:   Procedure Laterality Date    COLONOSCOPY  12/01/2016    Complete Colonoscopy    HAND SURGERY  12/01/2016    Hand Surgery                                                                                                                                                          LUNG LOBECTOMY  05/22/2015    Lung Lobectomy    OTHER SURGICAL HISTORY  10/31/2019    Knee replacement    OTHER SURGICAL HISTORY  09/18/2022    Epidural steroid injection    OTHER SURGICAL HISTORY  09/18/2022    Bronchoscopy     Family History   Problem Relation Name Age of Onset    Hypertension Mother      Lung cancer Father      Cancer Other Multiple Family Members      Current Outpatient Medications   Medication Sig Dispense Refill    albuterol 2.5 mg /3 mL (0.083 %) nebulizer solution Take 3 mL (2.5 mg) by nebulization every 6 hours if needed for  wheezing or shortness of breath. 100 mL 2    albuterol 90 mcg/actuation inhaler TAKE 2 PUFFS BY MOUTH EVERY 4 TO 6 HOURS AS NEEDED 18 g 11    atorvastatin (Lipitor) 20 mg tablet TAKE 1 TABLET BY MOUTH EVERY DAY 90 tablet 3    blood sugar diagnostic (Blood Glucose Test) strip 1 strip by in vitro route once daily.      docusate sodium (Colace) 100 mg capsule Take 1 capsule (100 mg) by mouth 2 times a day.      ergocalciferol (Vitamin D-2) 1.25 MG (98358 UT) capsule TAKE 1 CAPSULE BY MOUTH ONE TIME PER WEEK 12 capsule 2    glyBURIDE (Diabeta) 5 mg tablet Take 1 tablet (5 mg) by mouth once daily.      lancets (Fingerstix Lancets) misc once daily.      lisinopril 10 mg tablet Take 1 tablet (10 mg) by mouth once daily.      losartan (Cozaar) 25 mg tablet TAKE 1 TABLET BY MOUTH EVERY  tablet 2    meloxicam (Mobic) 15 mg tablet TAKE 1 TABLET EVERY DAY AS NEEDED 30 tablet 3    metFORMIN XR (Glucophage-XR) 500 mg 24 hr tablet TAKE 2 TABLETS TWICE A  tablet 3    multivitamin with minerals (multivitamin with folic acid) tablet Take 1 tablet by mouth once daily.      pioglitazone (Actos) 30 mg tablet Take 1 tablet (30 mg) by mouth once daily.      tadalafil (Cialis) 5 mg tablet Take 1 tablet (5 mg) by mouth once daily. 90 tablet 3    tadalafil 20 mg tablet Take 1 tablet (20 mg) by mouth once daily as needed for erectile dysfunction. take one hour before activity 30 tablet 2    traMADol (Ultram) 50 mg tablet 1 tablet (50 mg).      Trelegy Ellipta 100-62.5-25 mcg blister with device INHALE 1 PUFF ONCE DAILY. RINSE MOUTH WITH WATER AFTER USE TO REDUCE AFTERTASTE AND INCIDENCE OF CANDIDIASIS. DO NOT SWALLOW. 60 each 3     No current facility-administered medications for this visit.     No Known Allergies  Social History     Socioeconomic History    Marital status:      Spouse name: Not on file    Number of children: Not on file    Years of education: Not on file    Highest education level: Not on file    Occupational History    Not on file   Tobacco Use    Smoking status: Former     Current packs/day: 0.00     Types: Cigarettes     Quit date:      Years since quittin.3     Passive exposure: Past    Smokeless tobacco: Never   Vaping Use    Vaping status: Never Used   Substance and Sexual Activity    Alcohol use: Yes     Alcohol/week: 1.0 standard drink of alcohol     Types: 1 Cans of beer per week    Drug use: Never    Sexual activity: Yes   Other Topics Concern    Not on file   Social History Narrative    Not on file     Social Determinants of Health     Financial Resource Strain: Not on file   Food Insecurity: No Food Insecurity (2024)    Hunger Vital Sign     Worried About Running Out of Food in the Last Year: Never true     Ran Out of Food in the Last Year: Never true   Transportation Needs: Not on file   Physical Activity: Not on file   Stress: Not on file   Social Connections: Not on file   Intimate Partner Violence: Not on file   Housing Stability: Not on file       Review of Systems  Pertinent items are noted in HPI.    Objective     Lab Review  Lab Results   Component Value Date    WBC 7.9 2024    RBC 5.88 2024    HGB 19.2 (H) 2024    HCT 59.6 (H) 2024     2024      Lab Results   Component Value Date    BUN 16 2024    CREATININE 1.01 2024      Lab Results   Component Value Date    PSA 3.64 2023         Assessment/Plan   There are no diagnoses linked to this encounter.    Nephrolithiasis s/p left ULLS 2022 and second stage left ULLS 2022   Renal Cysts     I reviewed renal US from 2024 which showed non-obstructing bilateral nephrolithiasis, measuring 4 mm in the upper pole of right kidney and 1.4 cm in the upper pole of the left kidney. No hydronephrosis. Bilateral stable renal cysts.     We will obtain a CT A&P for further evaluation.     Follow up virtually to review.     All questions were answered to the patient's  satisfaction. Patient agrees with the plan and wishes to proceed. Follow-up will be scheduled appropriately.     E&M visit today is associated with current or anticipated ongoing medical care services related to a patient's single, serious condition or a complex condition.     Scribed for Dr. Ambriz by Blanche Hackett. I , Dr Ambriz, have personally reviewed and agreed with the information entered by the Virtual Scribe.

## 2024-04-30 NOTE — PROGRESS NOTES
FUV    Last seen - 1/18/24     HISTORY OF PRESENT ILLNESS:   Ravin Camacho is a 69 y.o. male who is being seen today for continue cystoscopic surveillance.    PAST MEDICAL HISTORY:  Past Medical History:   Diagnosis Date    Acute bronchitis with bronchospasm 04/05/2023    Acute otitis media 04/05/2023    Cluster headache 04/05/2023    Cough 04/05/2023    Hip pain, right 04/05/2023    Lattice degeneration of both retinas 04/05/2023    Personal history of irradiation 09/18/2022    History of radiation therapy    Pure hypercholesterolemia, unspecified     High cholesterol    Serous otitis media 04/05/2023    Stiffness of right knee, not elsewhere classified 04/05/2023    Suspected UTI 04/05/2023    UTI symptoms 07/20/2023       PAST SURGICAL HISTORY:  Past Surgical History:   Procedure Laterality Date    COLONOSCOPY  12/01/2016    Complete Colonoscopy    HAND SURGERY  12/01/2016    Hand Surgery                                                                                                                                                          LUNG LOBECTOMY  05/22/2015    Lung Lobectomy    OTHER SURGICAL HISTORY  10/31/2019    Knee replacement    OTHER SURGICAL HISTORY  09/18/2022    Epidural steroid injection    OTHER SURGICAL HISTORY  09/18/2022    Bronchoscopy        ALLERGIES:   No Known Allergies     MEDICATIONS:   Current Outpatient Medications   Medication Instructions    albuterol 90 mcg/actuation inhaler TAKE 2 PUFFS BY MOUTH EVERY 4 TO 6 HOURS AS NEEDED    albuterol 2.5 mg, nebulization, Every 6 hours PRN    atorvastatin (LIPITOR) 20 mg, oral, Daily    blood sugar diagnostic (Blood Glucose Test) strip 1 strip, in vitro, Daily    docusate sodium (COLACE) 100 mg, oral, 2 times daily    ergocalciferol (Vitamin D-2) 1.25 MG (99623 UT) capsule TAKE 1 CAPSULE BY MOUTH ONE TIME PER WEEK    glyBURIDE (Diabeta) 5 mg tablet 1 tablet, oral, Daily    lancets (Fingerstix Lancets) misc Daily    lisinopril 10 mg  "tablet 1 tablet, oral, Daily    losartan (Cozaar) 25 mg tablet TAKE 1 TABLET BY MOUTH EVERY DAY    meloxicam (Mobic) 15 mg tablet TAKE 1 TABLET EVERY DAY AS NEEDED    metFORMIN XR (Glucophage-XR) 500 mg 24 hr tablet TAKE 2 TABLETS TWICE A DAY    multivitamin with minerals (multivitamin with folic acid) tablet 1 tablet, oral, Daily    pioglitazone (Actos) 30 mg tablet 1 tablet, oral, Daily    tadalafil (CIALIS) 5 mg, oral, Daily    tadalafil (CIALIS) 20 mg, oral, Daily PRN, take one hour before activity    traMADol (ULTRAM) 50 mg    Trelegy Ellipta 100-62.5-25 mcg blister with device 1 puff, inhalation, Daily, Rinse mouth with water after use to reduce aftertaste and incidence of candidiasis. Do not swallow.        PHYSICAL EXAM:  There were no vitals taken for this visit.  Constitutional: Patient appears well-developed and well-nourished. No distress.    Pulmonary/Chest: Effort normal. No respiratory distress.   Abdominal: Soft, ND NT  : WNL  Musculoskeletal: Normal range of motion.    Neurological: Alert and oriented to person, place, and time.  Psychiatric: Normal mood and affect. Behavior is normal. Thought content normal.      Labs:  No results found for: \"TESTOSTERONE\"  Lab Results   Component Value Date    PSA 3.64 08/11/2023     No components found for: \"CBC\"  Lab Results   Component Value Date    CREATININE 1.01 03/29/2024     No components found for: \"TESTOTMS\"  Lab Results   Component Value Date    TESTF 64.3 10/28/2021     Imaging:  - Renal US on 4/18/24 demonstrated No bladder mass. Non obstructing bilateral nephrolithiasis without hydronephrosis. 1.4cm stone in the left kidney, will continue to follow with Dr. Ambriz for treatment on this.    Discussion:  Pt is still receiving monthly maintenance of Gemcitabine/Docetaxel intravesical instillations x 1 year (07/2024) by Charleen Sutton NP, last was on 4/2/24. Denies any dysuria, hematuria, bothersome urinary frequency, urgency, or flank pain.     The " cystoscopy was completed today due to T1 papillary urothelial carcinoma, high grade and demonstrated no tumors, stones or lesions. Cytology sent. 1 abx given to patient in clinic today. Patient instructed to follow up in 3 months for continued surveillance.    Cystoscopic surveillance schedule: q3 months until 3/2025, q6 until 3/2028, and yearly after no recurrences.    Assessment:      No diagnosis found.  T1 papillary urothelial carcinoma, high grade   Renal stone  Ravin Camacho is a 69 y.o. male here for [facility]UV     Plan:   Will follow up in 3 months for continue cystoscopic surveillance.  Pt will continue to follow with Dr. Ambriz for evaluation and treatment of 1.4cm renal stone in the left kidney found on renal US.  All questions and concerns were addressed. Patient verbalizes understanding and has no other questions at this time.     Scribe Attestation:  By signing my name below, Jane CARRERA Scribe   attest that this documentation has been prepared under the direction and in the presence of Mello Frausto MD.

## 2024-05-01 DIAGNOSIS — E11.9 TYPE 2 DIABETES MELLITUS WITHOUT COMPLICATIONS (MULTI): ICD-10-CM

## 2024-05-01 RX ORDER — CIPROFLOXACIN 500 MG/1
500 TABLET ORAL ONCE
Status: COMPLETED | OUTPATIENT
Start: 2024-05-02 | End: 2024-05-02

## 2024-05-02 ENCOUNTER — APPOINTMENT (OUTPATIENT)
Dept: LAB | Facility: LAB | Age: 69
End: 2024-05-02
Payer: MEDICARE

## 2024-05-02 ENCOUNTER — PROCEDURE VISIT (OUTPATIENT)
Dept: UROLOGY | Facility: HOSPITAL | Age: 69
End: 2024-05-02
Payer: MEDICARE

## 2024-05-02 DIAGNOSIS — C67.4 MALIGNANT NEOPLASM OF POSTERIOR WALL OF URINARY BLADDER (MULTI): ICD-10-CM

## 2024-05-02 DIAGNOSIS — C67.9 MALIGNANT NEOPLASM OF URINARY BLADDER, UNSPECIFIED SITE (MULTI): ICD-10-CM

## 2024-05-02 DIAGNOSIS — Z79.2 PROPHYLACTIC ANTIBIOTIC: ICD-10-CM

## 2024-05-02 PROCEDURE — 88112 CYTOPATH CELL ENHANCE TECH: CPT | Mod: TC | Performed by: PATHOLOGY

## 2024-05-02 PROCEDURE — 52000 CYSTOURETHROSCOPY: CPT | Performed by: UROLOGY

## 2024-05-02 PROCEDURE — 88112 CYTOPATH CELL ENHANCE TECH: CPT | Mod: TC,MCY | Performed by: UROLOGY

## 2024-05-02 PROCEDURE — 2500000001 HC RX 250 WO HCPCS SELF ADMINISTERED DRUGS (ALT 637 FOR MEDICARE OP): Performed by: UROLOGY

## 2024-05-02 RX ORDER — METFORMIN HYDROCHLORIDE 500 MG/1
1000 TABLET, EXTENDED RELEASE ORAL 2 TIMES DAILY
Qty: 360 TABLET | Refills: 3 | Status: SHIPPED | OUTPATIENT
Start: 2024-05-02

## 2024-05-02 RX ADMIN — CIPROFLOXACIN 500 MG: 500 TABLET ORAL at 12:50

## 2024-05-03 ENCOUNTER — LAB (OUTPATIENT)
Dept: LAB | Facility: LAB | Age: 69
End: 2024-05-03
Payer: MEDICARE

## 2024-05-03 DIAGNOSIS — C67.9 MALIGNANT NEOPLASM OF URINARY BLADDER, UNSPECIFIED SITE (MULTI): ICD-10-CM

## 2024-05-03 LAB
ALBUMIN SERPL BCP-MCNC: 4.5 G/DL (ref 3.4–5)
ANION GAP SERPL CALC-SCNC: 18 MMOL/L (ref 10–20)
BASOPHILS # BLD AUTO: 0.05 X10*3/UL (ref 0–0.1)
BASOPHILS NFR BLD AUTO: 0.7 %
BUN SERPL-MCNC: 16 MG/DL (ref 6–23)
CALCIUM SERPL-MCNC: 9.5 MG/DL (ref 8.6–10.6)
CHLORIDE SERPL-SCNC: 104 MMOL/L (ref 98–107)
CO2 SERPL-SCNC: 26 MMOL/L (ref 21–32)
CREAT SERPL-MCNC: 1.12 MG/DL (ref 0.5–1.3)
EGFRCR SERPLBLD CKD-EPI 2021: 71 ML/MIN/1.73M*2
EOSINOPHIL # BLD AUTO: 0.06 X10*3/UL (ref 0–0.7)
EOSINOPHIL NFR BLD AUTO: 0.8 %
ERYTHROCYTE [DISTWIDTH] IN BLOOD BY AUTOMATED COUNT: 12.8 % (ref 11.5–14.5)
GLUCOSE SERPL-MCNC: 120 MG/DL (ref 74–99)
HCT VFR BLD AUTO: 59.8 % (ref 41–52)
HGB BLD-MCNC: 20.2 G/DL (ref 13.5–17.5)
IMM GRANULOCYTES # BLD AUTO: 0.04 X10*3/UL (ref 0–0.7)
IMM GRANULOCYTES NFR BLD AUTO: 0.5 % (ref 0–0.9)
LYMPHOCYTES # BLD AUTO: 1.56 X10*3/UL (ref 1.2–4.8)
LYMPHOCYTES NFR BLD AUTO: 21.4 %
MCH RBC QN AUTO: 33.6 PG (ref 26–34)
MCHC RBC AUTO-ENTMCNC: 33.8 G/DL (ref 32–36)
MCV RBC AUTO: 99 FL (ref 80–100)
MONOCYTES # BLD AUTO: 0.64 X10*3/UL (ref 0.1–1)
MONOCYTES NFR BLD AUTO: 8.8 %
NEUTROPHILS # BLD AUTO: 4.95 X10*3/UL (ref 1.2–7.7)
NEUTROPHILS NFR BLD AUTO: 67.8 %
NRBC BLD-RTO: 0 /100 WBCS (ref 0–0)
PHOSPHATE SERPL-MCNC: 3.7 MG/DL (ref 2.5–4.9)
PLATELET # BLD AUTO: 197 X10*3/UL (ref 150–450)
POTASSIUM SERPL-SCNC: 4.9 MMOL/L (ref 3.5–5.3)
RBC # BLD AUTO: 6.02 X10*6/UL (ref 4.5–5.9)
SODIUM SERPL-SCNC: 143 MMOL/L (ref 136–145)
WBC # BLD AUTO: 7.3 X10*3/UL (ref 4.4–11.3)

## 2024-05-03 PROCEDURE — 87086 URINE CULTURE/COLONY COUNT: CPT

## 2024-05-03 PROCEDURE — 85025 COMPLETE CBC W/AUTO DIFF WBC: CPT

## 2024-05-03 PROCEDURE — 80069 RENAL FUNCTION PANEL: CPT

## 2024-05-03 PROCEDURE — 81001 URINALYSIS AUTO W/SCOPE: CPT

## 2024-05-03 PROCEDURE — 36415 COLL VENOUS BLD VENIPUNCTURE: CPT

## 2024-05-04 LAB
APPEARANCE UR: ABNORMAL
BILIRUB UR STRIP.AUTO-MCNC: NEGATIVE MG/DL
COLOR UR: ABNORMAL
GLUCOSE UR STRIP.AUTO-MCNC: NORMAL MG/DL
KETONES UR STRIP.AUTO-MCNC: NEGATIVE MG/DL
LEUKOCYTE ESTERASE UR QL STRIP.AUTO: NEGATIVE
MUCOUS THREADS #/AREA URNS AUTO: NORMAL /LPF
NITRITE UR QL STRIP.AUTO: NEGATIVE
PH UR STRIP.AUTO: 5.5 [PH]
PROT UR STRIP.AUTO-MCNC: ABNORMAL MG/DL
RBC # UR STRIP.AUTO: ABNORMAL /UL
RBC #/AREA URNS AUTO: NORMAL /HPF
SP GR UR STRIP.AUTO: 1.02
SQUAMOUS #/AREA URNS AUTO: NORMAL /HPF
UROBILINOGEN UR STRIP.AUTO-MCNC: NORMAL MG/DL
WBC #/AREA URNS AUTO: NORMAL /HPF

## 2024-05-05 LAB — BACTERIA UR CULT: NO GROWTH

## 2024-05-07 ENCOUNTER — PROCEDURE VISIT (OUTPATIENT)
Dept: UROLOGY | Facility: HOSPITAL | Age: 69
End: 2024-05-07
Payer: MEDICARE

## 2024-05-07 DIAGNOSIS — C67.9 MALIGNANT NEOPLASM OF URINARY BLADDER, UNSPECIFIED SITE (MULTI): ICD-10-CM

## 2024-05-07 PROCEDURE — 51720 TREATMENT OF BLADDER LESION: CPT | Performed by: NURSE PRACTITIONER

## 2024-05-07 PROCEDURE — 2500000004 HC RX 250 GENERAL PHARMACY W/ HCPCS (ALT 636 FOR OP/ED): Performed by: UROLOGY

## 2024-05-07 PROCEDURE — 99214 OFFICE O/P EST MOD 30 MIN: CPT | Performed by: NURSE PRACTITIONER

## 2024-05-07 PROCEDURE — 51702 INSERT TEMP BLADDER CATH: CPT | Performed by: NURSE PRACTITIONER

## 2024-05-07 RX ORDER — EPINEPHRINE 0.3 MG/.3ML
0.3 INJECTION SUBCUTANEOUS EVERY 5 MIN PRN
Status: DISCONTINUED | OUTPATIENT
Start: 2024-05-07 | End: 2024-05-07 | Stop reason: HOSPADM

## 2024-05-07 RX ORDER — PROCHLORPERAZINE EDISYLATE 5 MG/ML
10 INJECTION INTRAMUSCULAR; INTRAVENOUS EVERY 6 HOURS PRN
Status: DISCONTINUED | OUTPATIENT
Start: 2024-05-07 | End: 2024-05-07 | Stop reason: HOSPADM

## 2024-05-07 RX ORDER — DIPHENHYDRAMINE HYDROCHLORIDE 50 MG/ML
50 INJECTION INTRAMUSCULAR; INTRAVENOUS AS NEEDED
Status: DISCONTINUED | OUTPATIENT
Start: 2024-05-07 | End: 2024-05-07 | Stop reason: HOSPADM

## 2024-05-07 RX ORDER — ALBUTEROL SULFATE 0.83 MG/ML
3 SOLUTION RESPIRATORY (INHALATION) AS NEEDED
Status: DISCONTINUED | OUTPATIENT
Start: 2024-05-07 | End: 2024-05-07 | Stop reason: HOSPADM

## 2024-05-07 RX ORDER — FAMOTIDINE 10 MG/ML
20 INJECTION INTRAVENOUS ONCE AS NEEDED
Status: DISCONTINUED | OUTPATIENT
Start: 2024-05-07 | End: 2024-05-07 | Stop reason: HOSPADM

## 2024-05-07 RX ORDER — PROCHLORPERAZINE MALEATE 10 MG
10 TABLET ORAL EVERY 6 HOURS PRN
Status: DISCONTINUED | OUTPATIENT
Start: 2024-05-07 | End: 2024-05-07 | Stop reason: HOSPADM

## 2024-05-07 RX ADMIN — DOCETAXEL ANHYDROUS 38 MG: 10 INJECTION, SOLUTION INTRAVENOUS at 13:37

## 2024-05-07 RX ADMIN — GEMCITABINE 1000 MG: 38 INJECTION, SOLUTION INTRAVENOUS at 12:05

## 2024-05-07 NOTE — PROGRESS NOTES
Subjective   Patient ID: Ravin Camacho is a 69 y.o. male who presents for monthly maintenance Gemcitabine/Docetaxel intravesical instillation. (16 F coude)     HPI  History of lung CA, bladder mass with surgical pathology on 03/07/22 demonstrating invasive papillary urothelial carcinoma, high grade, s/p TURBT with Dr Mcknight on 03/01/2023.     BCG with maintenance cycle 1 completed on Oct 17th 2022. Renal US on 1/10/23 demonstrated bilateral nonobstructing nephrolithiasis and cystoscopy completed on 2/9/23 demonstrated 1 small bladder tumor to the left posterior wall. Surgical pathology on 3/1/23 demonstrating non-invasive papillary urothelial carcinoma, high grade.     Surveillance cystoscopy with Dr. Frausto 04/05/2024  The cystoscopy was completed today due to T1 papillary urothelial carcinoma, high grade and demonstrated no tumors, stones or lesions.     Review of Systems  All other systems have been reviewed and are negative for complaint.    Objective   Physical Exam  General: Well developed, well nourished, alert and cooperative, appears in no acute distress  Eyes: Non-injected conjunctiva, sclera clear, no proptosis  Cardiac: Extremities are warm and well perfused. No edema, cyanosis or pallor.   Lungs: Breathing is easy, non-labored. Speaking in clear and complete sentences. Normal diaphragmatic movement.  MSK: Ambulatory with steady gait, unassisted  Neuro: alert and oriented to person, place and time  Psych: Demonstrates good judgement and reason, without hallucinations, abnormal affect or abnormal behaviors.  Skin: no obvious lesions, no rashes.    Assessment/Plan        Pts current cysto schedule: q3 months until 03/2025, q6 until 03/2028, and yearly after no recurrences.    Today we discussed in great detail risks, benefits, and possible adverse reactions to Gemcitabine/Docetaxel intravesical Chemo instillations. Gemcitabine 1000 mg and Docetaxel 37.5 mg will be instilled intravesically weekly for  6 weeks with a maximum of 2 cycles. Avoid drinking fluids 4 hours prior to instillation    Pretreatment:   Patient will complete CBC, RFP, Urinalysis, and Urine Culture every Friday prior to Tuesday Bladder instillation.  Hold treatment if patient experiences urinary frequency or urgency lasting > 24 hour, suprapubic pain, grade 3 dysuria, grade 2 gross hematuria (without clots) or grade 3 hematuria (with clots) lasting > 48 hours, or platelets are < 75,000/mm3    After administration:  Contact office for new or worsening signs and symptoms of urinary frequency, urgency, hematuria, dysuria, or myalgia.  Void in a seated position to avoid splashing of urine  Flush toilet twice after first void  Wash hands thoroughly with soap and water after urinating  Drink plenty of water in order to flush the bladder    Patient will return to [facility] for monthly maintenance Gemcitabine/Docetaxel intravesical instillations x 1 year (07/2024) All questions and concerns were addressed. Patient verbalizes understanding and has no other questions at this time.     Surveillance cystoscopies q3 months until 3/2025, q6 until 3/2028, and yearly after no recurrences.   Cystoscopy with Dr. Frausto 05/02/2024    Charleen Sutton-- ROGERS DELACRUZ  Office Phone: [phone]

## 2024-05-14 ENCOUNTER — HOSPITAL ENCOUNTER (OUTPATIENT)
Dept: RADIOLOGY | Facility: CLINIC | Age: 69
Discharge: HOME | End: 2024-05-14
Payer: MEDICARE

## 2024-05-14 DIAGNOSIS — N20.0 CALCULUS OF KIDNEY: ICD-10-CM

## 2024-05-14 PROCEDURE — 74176 CT ABD & PELVIS W/O CONTRAST: CPT

## 2024-05-14 PROCEDURE — 74176 CT ABD & PELVIS W/O CONTRAST: CPT | Performed by: RADIOLOGY

## 2024-05-15 ENCOUNTER — TELEPHONE (OUTPATIENT)
Dept: PULMONOLOGY | Facility: HOSPITAL | Age: 69
End: 2024-05-15
Payer: MEDICARE

## 2024-05-15 NOTE — TELEPHONE ENCOUNTER
Per Suly Maria CNP, this nurse called the patient and explained that the results of his nocturnal oxymetry test came back showing his oxygen level fell below 88% for 5 hours and 23 minutes.  Informed the patient that d/t these results, he needs to either see a provider in sleep medicine and have a sleep study completed or use 2L of oxygen at night.  Patient expressed he would rather use the nocturnal oxygen than have a sleep study.  Oxygen ordered from [facility] via parachute.

## 2024-05-27 DIAGNOSIS — M17.12 UNILATERAL PRIMARY OSTEOARTHRITIS, LEFT KNEE: ICD-10-CM

## 2024-05-28 RX ORDER — MELOXICAM 15 MG/1
TABLET ORAL
Qty: 90 TABLET | Refills: 1 | Status: SHIPPED | OUTPATIENT
Start: 2024-05-28 | End: 2024-08-26

## 2024-05-29 ENCOUNTER — TELEMEDICINE (OUTPATIENT)
Dept: UROLOGY | Facility: CLINIC | Age: 69
End: 2024-05-29
Payer: MEDICARE

## 2024-05-29 DIAGNOSIS — N20.0 CALCULUS OF KIDNEY: Primary | ICD-10-CM

## 2024-05-29 PROCEDURE — 99213 OFFICE O/P EST LOW 20 MIN: CPT | Performed by: UROLOGY

## 2024-05-29 PROCEDURE — 1159F MED LIST DOCD IN RCRD: CPT | Performed by: UROLOGY

## 2024-05-29 PROCEDURE — 4010F ACE/ARB THERAPY RXD/TAKEN: CPT | Performed by: UROLOGY

## 2024-05-29 PROCEDURE — 3008F BODY MASS INDEX DOCD: CPT | Performed by: UROLOGY

## 2024-05-29 PROCEDURE — G2211 COMPLEX E/M VISIT ADD ON: HCPCS | Performed by: UROLOGY

## 2024-05-29 NOTE — PROGRESS NOTES
Subjective     This visit was completed via telemedicine. All issues as below were discussed and addressed but no physical exam was performed unless allowed by visual confirmation. If it was felt that the patient should be evaluated in clinic, then they were directed there. Patient verbally consented to visit.      Ravin Camacho is a 69 y.o. male with a history of bladder cancer and nephrolithiasis s/p left ULLS 05/23/2022 and second stage left ULLS 06/13/2022. He presents today to review CT A&P. Patient has no new complaints.         Past Medical History:   Diagnosis Date    Acute bronchitis with bronchospasm 04/05/2023    Acute otitis media 04/05/2023    Cluster headache 04/05/2023    Cough 04/05/2023    Hip pain, right 04/05/2023    Lattice degeneration of both retinas 04/05/2023    Personal history of irradiation 09/18/2022    History of radiation therapy    Pure hypercholesterolemia, unspecified     High cholesterol    Serous otitis media 04/05/2023    Stiffness of right knee, not elsewhere classified 04/05/2023    Suspected UTI 04/05/2023    UTI symptoms 07/20/2023     Past Surgical History:   Procedure Laterality Date    COLONOSCOPY  12/01/2016    Complete Colonoscopy    HAND SURGERY  12/01/2016    Hand Surgery                                                                                                                                                          LUNG LOBECTOMY  05/22/2015    Lung Lobectomy    OTHER SURGICAL HISTORY  10/31/2019    Knee replacement    OTHER SURGICAL HISTORY  09/18/2022    Epidural steroid injection    OTHER SURGICAL HISTORY  09/18/2022    Bronchoscopy     Family History   Problem Relation Name Age of Onset    Hypertension Mother      Lung cancer Father      Cancer Other Multiple Family Members      Current Outpatient Medications   Medication Sig Dispense Refill    albuterol 2.5 mg /3 mL (0.083 %) nebulizer solution Take 3 mL (2.5 mg) by nebulization every 6 hours if  needed for wheezing or shortness of breath. 100 mL 2    albuterol 90 mcg/actuation inhaler TAKE 2 PUFFS BY MOUTH EVERY 4 TO 6 HOURS AS NEEDED 18 g 11    atorvastatin (Lipitor) 20 mg tablet TAKE 1 TABLET BY MOUTH EVERY DAY 90 tablet 3    blood sugar diagnostic (Blood Glucose Test) strip 1 strip by in vitro route once daily.      docusate sodium (Colace) 100 mg capsule Take 1 capsule (100 mg) by mouth 2 times a day.      ergocalciferol (Vitamin D-2) 1.25 MG (62814 UT) capsule TAKE 1 CAPSULE BY MOUTH ONE TIME PER WEEK 12 capsule 2    glyBURIDE (Diabeta) 5 mg tablet Take 1 tablet (5 mg) by mouth once daily.      lancets (Fingerstix Lancets) misc once daily.      lisinopril 10 mg tablet Take 1 tablet (10 mg) by mouth once daily.      losartan (Cozaar) 25 mg tablet TAKE 1 TABLET BY MOUTH EVERY  tablet 2    meloxicam (Mobic) 15 mg tablet TAKE 1 TABLET EVERY DAY AS NEEDED 90 tablet 1    metFORMIN  mg 24 hr tablet TAKE 2 TABLETS BY MOUTH TWICE A  tablet 3    multivitamin with minerals (multivitamin with folic acid) tablet Take 1 tablet by mouth once daily.      pioglitazone (Actos) 30 mg tablet Take 1 tablet (30 mg) by mouth once daily.      tadalafil (Cialis) 5 mg tablet Take 1 tablet (5 mg) by mouth once daily. 90 tablet 3    tadalafil 20 mg tablet Take 1 tablet (20 mg) by mouth once daily as needed for erectile dysfunction. take one hour before activity 30 tablet 2    traMADol (Ultram) 50 mg tablet 1 tablet (50 mg).      Trelegy Ellipta 100-62.5-25 mcg blister with device INHALE 1 PUFF ONCE DAILY. RINSE MOUTH WITH WATER AFTER USE TO REDUCE AFTERTASTE AND INCIDENCE OF CANDIDIASIS. DO NOT SWALLOW. 60 each 3     No current facility-administered medications for this visit.     No Known Allergies  Social History     Socioeconomic History    Marital status:      Spouse name: Not on file    Number of children: Not on file    Years of education: Not on file    Highest education level: Not on file    Occupational History    Not on file   Tobacco Use    Smoking status: Former     Current packs/day: 0.00     Types: Cigarettes     Quit date:      Years since quittin.4     Passive exposure: Past    Smokeless tobacco: Never   Vaping Use    Vaping status: Never Used   Substance and Sexual Activity    Alcohol use: Yes     Alcohol/week: 1.0 standard drink of alcohol     Types: 1 Cans of beer per week    Drug use: Never    Sexual activity: Yes   Other Topics Concern    Not on file   Social History Narrative    Not on file     Social Determinants of Health     Financial Resource Strain: Not on file   Food Insecurity: No Food Insecurity (2024)    Hunger Vital Sign     Worried About Running Out of Food in the Last Year: Never true     Ran Out of Food in the Last Year: Never true   Transportation Needs: Not on file   Physical Activity: Not on file   Stress: Not on file   Social Connections: Not on file   Intimate Partner Violence: Not on file   Housing Stability: Not on file       Review of Systems  Pertinent items are noted in HPI.    Objective       Lab Review  Lab Results   Component Value Date    WBC 7.3 2024    RBC 6.02 (H) 2024    HGB 20.2 (H) 2024    HCT 59.8 (H) 2024     2024      Lab Results   Component Value Date    BUN 16 2024    CREATININE 1.12 2024      Lab Results   Component Value Date    PSA 3.64 2023     Assessment/Plan   There are no diagnoses linked to this encounter.    Nephrolithiasis s/p left ULLS 2022 and second stage left ULLS 2022     I reviewed CT A&P from 2024 which showed multiple bilateral nonobstructing renal stones, including a  7 mm and a 3 mm stones in the lower pole of the left kidney, and 4 mm and 3 mm stones in the upper pole of the right kidney. No ureteral stones or hydroureteronephrosis is evident. Simple bilateral renal cysts again noted.    We will follow up annually with renal US to monitor stone  presence and formation.     All questions were answered to the patient's satisfaction. Patient agrees with the plan and wishes to proceed. Follow-up will be scheduled appropriately.     E&M visit today is associated with current or anticipated ongoing medical care services related to a patient's single, serious condition or a complex condition.    Scribed for Dr. Ambriz by Blanche Hackett. I , Dr Ambriz, have personally reviewed and agreed with the information entered by the Virtual Scribe.

## 2024-05-31 ENCOUNTER — LAB (OUTPATIENT)
Dept: LAB | Facility: LAB | Age: 69
End: 2024-05-31
Payer: MEDICARE

## 2024-05-31 DIAGNOSIS — C67.9 MALIGNANT NEOPLASM OF URINARY BLADDER, UNSPECIFIED SITE (MULTI): ICD-10-CM

## 2024-05-31 LAB
ALBUMIN SERPL BCP-MCNC: 4.5 G/DL (ref 3.4–5)
ANION GAP SERPL CALC-SCNC: 11 MMOL/L (ref 10–20)
APPEARANCE UR: ABNORMAL
BASOPHILS # BLD AUTO: 0.03 X10*3/UL (ref 0–0.1)
BASOPHILS NFR BLD AUTO: 0.4 %
BILIRUB UR STRIP.AUTO-MCNC: NEGATIVE MG/DL
BUN SERPL-MCNC: 16 MG/DL (ref 6–23)
CALCIUM SERPL-MCNC: 9.9 MG/DL (ref 8.6–10.6)
CHLORIDE SERPL-SCNC: 102 MMOL/L (ref 98–107)
CO2 SERPL-SCNC: 34 MMOL/L (ref 21–32)
COLOR UR: YELLOW
CREAT SERPL-MCNC: 1.1 MG/DL (ref 0.5–1.3)
EGFRCR SERPLBLD CKD-EPI 2021: 73 ML/MIN/1.73M*2
EOSINOPHIL # BLD AUTO: 0.1 X10*3/UL (ref 0–0.7)
EOSINOPHIL NFR BLD AUTO: 1.2 %
ERYTHROCYTE [DISTWIDTH] IN BLOOD BY AUTOMATED COUNT: 12.4 % (ref 11.5–14.5)
GLUCOSE SERPL-MCNC: 109 MG/DL (ref 74–99)
GLUCOSE UR STRIP.AUTO-MCNC: NORMAL MG/DL
HCT VFR BLD AUTO: 59.1 % (ref 41–52)
HGB BLD-MCNC: 19.8 G/DL (ref 13.5–17.5)
IMM GRANULOCYTES # BLD AUTO: 0.05 X10*3/UL (ref 0–0.7)
IMM GRANULOCYTES NFR BLD AUTO: 0.6 % (ref 0–0.9)
KETONES UR STRIP.AUTO-MCNC: NEGATIVE MG/DL
LEUKOCYTE ESTERASE UR QL STRIP.AUTO: NEGATIVE
LYMPHOCYTES # BLD AUTO: 1.24 X10*3/UL (ref 1.2–4.8)
LYMPHOCYTES NFR BLD AUTO: 15.3 %
MCH RBC QN AUTO: 34.2 PG (ref 26–34)
MCHC RBC AUTO-ENTMCNC: 33.5 G/DL (ref 32–36)
MCV RBC AUTO: 102 FL (ref 80–100)
MONOCYTES # BLD AUTO: 0.76 X10*3/UL (ref 0.1–1)
MONOCYTES NFR BLD AUTO: 9.4 %
NEUTROPHILS # BLD AUTO: 5.93 X10*3/UL (ref 1.2–7.7)
NEUTROPHILS NFR BLD AUTO: 73.1 %
NITRITE UR QL STRIP.AUTO: NEGATIVE
NRBC BLD-RTO: 0 /100 WBCS (ref 0–0)
PH UR STRIP.AUTO: 5.5 [PH]
PHOSPHATE SERPL-MCNC: 4.4 MG/DL (ref 2.5–4.9)
PLATELET # BLD AUTO: 225 X10*3/UL (ref 150–450)
POTASSIUM SERPL-SCNC: 4.8 MMOL/L (ref 3.5–5.3)
PROT UR STRIP.AUTO-MCNC: ABNORMAL MG/DL
RBC # BLD AUTO: 5.79 X10*6/UL (ref 4.5–5.9)
RBC # UR STRIP.AUTO: ABNORMAL /UL
RBC #/AREA URNS AUTO: NORMAL /HPF
SODIUM SERPL-SCNC: 142 MMOL/L (ref 136–145)
SP GR UR STRIP.AUTO: 1.03
UROBILINOGEN UR STRIP.AUTO-MCNC: ABNORMAL MG/DL
WBC # BLD AUTO: 8.1 X10*3/UL (ref 4.4–11.3)
WBC #/AREA URNS AUTO: NORMAL /HPF

## 2024-05-31 PROCEDURE — 36415 COLL VENOUS BLD VENIPUNCTURE: CPT

## 2024-06-01 LAB — BACTERIA UR CULT: NO GROWTH

## 2024-06-04 ENCOUNTER — PROCEDURE VISIT (OUTPATIENT)
Dept: UROLOGY | Facility: HOSPITAL | Age: 69
End: 2024-06-04
Payer: MEDICARE

## 2024-06-04 VITALS
TEMPERATURE: 97.7 F | OXYGEN SATURATION: 94 % | RESPIRATION RATE: 20 BRPM | BODY MASS INDEX: 34.89 KG/M2 | WEIGHT: 243.17 LBS | DIASTOLIC BLOOD PRESSURE: 69 MMHG | SYSTOLIC BLOOD PRESSURE: 144 MMHG | HEART RATE: 89 BPM

## 2024-06-04 DIAGNOSIS — C67.9 MALIGNANT NEOPLASM OF URINARY BLADDER, UNSPECIFIED SITE (MULTI): Primary | ICD-10-CM

## 2024-06-04 PROCEDURE — 2500000004 HC RX 250 GENERAL PHARMACY W/ HCPCS (ALT 636 FOR OP/ED): Performed by: UROLOGY

## 2024-06-04 PROCEDURE — 51702 INSERT TEMP BLADDER CATH: CPT | Performed by: NURSE PRACTITIONER

## 2024-06-04 PROCEDURE — 99214 OFFICE O/P EST MOD 30 MIN: CPT | Performed by: NURSE PRACTITIONER

## 2024-06-04 PROCEDURE — 51720 TREATMENT OF BLADDER LESION: CPT | Performed by: NURSE PRACTITIONER

## 2024-06-04 RX ORDER — PROCHLORPERAZINE MALEATE 10 MG
10 TABLET ORAL EVERY 6 HOURS PRN
Status: DISCONTINUED | OUTPATIENT
Start: 2024-06-04 | End: 2024-06-04 | Stop reason: HOSPADM

## 2024-06-04 RX ORDER — FAMOTIDINE 10 MG/ML
20 INJECTION INTRAVENOUS ONCE AS NEEDED
Status: DISCONTINUED | OUTPATIENT
Start: 2024-06-04 | End: 2024-06-04 | Stop reason: HOSPADM

## 2024-06-04 RX ORDER — EPINEPHRINE 0.3 MG/.3ML
0.3 INJECTION SUBCUTANEOUS EVERY 5 MIN PRN
Status: DISCONTINUED | OUTPATIENT
Start: 2024-06-04 | End: 2024-06-04 | Stop reason: HOSPADM

## 2024-06-04 RX ORDER — DIPHENHYDRAMINE HYDROCHLORIDE 50 MG/ML
50 INJECTION INTRAMUSCULAR; INTRAVENOUS AS NEEDED
Status: DISCONTINUED | OUTPATIENT
Start: 2024-06-04 | End: 2024-06-04 | Stop reason: HOSPADM

## 2024-06-04 RX ORDER — ALBUTEROL SULFATE 0.83 MG/ML
3 SOLUTION RESPIRATORY (INHALATION) AS NEEDED
Status: DISCONTINUED | OUTPATIENT
Start: 2024-06-04 | End: 2024-06-04 | Stop reason: HOSPADM

## 2024-06-04 RX ORDER — PROCHLORPERAZINE EDISYLATE 5 MG/ML
10 INJECTION INTRAMUSCULAR; INTRAVENOUS EVERY 6 HOURS PRN
Status: DISCONTINUED | OUTPATIENT
Start: 2024-06-04 | End: 2024-06-04 | Stop reason: HOSPADM

## 2024-06-04 RX ADMIN — DOCETAXEL 38 MG: 10 INJECTION INTRAVENOUS at 13:06

## 2024-06-04 RX ADMIN — GEMCITABINE 1000 MG: 38 INJECTION, SOLUTION INTRAVENOUS at 12:01

## 2024-06-04 ASSESSMENT — PAIN SCALES - GENERAL: PAINLEVEL: 0-NO PAIN

## 2024-06-04 NOTE — PROGRESS NOTES
Weston 14F catheter easily placed, no bleeding present. Pt had some slight discomfort during weston placement but was resolved after balloon inflated.   Gemcitabine  instilled after bladder was drained.  catheter clamped had no difficulty retaining urine for one hour  then unclamped, bladder emptied for approx (amount) cc clear yellow urine  then instilled Docetaxel 37.5 mg and removed cath  tolerated well.

## 2024-06-04 NOTE — PROGRESS NOTES
Subjective   Patient ID: Ravin Camacho is a 69 y.o. male who presents for monthly maintenance Gemcitabine/Docetaxel intravesical instillation. (16 F coude)     HPI  History of lung CA, bladder mass with surgical pathology on 03/07/22 demonstrating invasive papillary urothelial carcinoma, high grade, s/p TURBT with Dr Mcknight on 03/01/2023.     BCG with maintenance cycle 1 completed on Oct 17th 2022. Renal US on 1/10/23 demonstrated bilateral nonobstructing nephrolithiasis and cystoscopy completed on 2/9/23 demonstrated 1 small bladder tumor to the left posterior wall. Surgical pathology on 3/1/23 demonstrating non-invasive papillary urothelial carcinoma, high grade.     Surveillance cystoscopy with Dr. Frausto 04/05/2024  The cystoscopy was completed today due to T1 papillary urothelial carcinoma, high grade and demonstrated no tumors, stones or lesions.     Review of Systems  All other systems have been reviewed and are negative for complaint.    Objective   Physical Exam  General: Well developed, well nourished, alert and cooperative, appears in no acute distress  Eyes: Non-injected conjunctiva, sclera clear, no proptosis  Cardiac: Extremities are warm and well perfused. No edema, cyanosis or pallor.   Lungs: Breathing is easy, non-labored. Speaking in clear and complete sentences. Normal diaphragmatic movement.  MSK: Ambulatory with steady gait, unassisted  Neuro: alert and oriented to person, place and time  Psych: Demonstrates good judgement and reason, without hallucinations, abnormal affect or abnormal behaviors.  Skin: no obvious lesions, no rashes.    Assessment/Plan        Pts current cysto schedule: q3 months until 03/2025, q6 until 03/2028, and yearly after no recurrences.    Today we discussed in great detail risks, benefits, and possible adverse reactions to Gemcitabine/Docetaxel intravesical Chemo instillations. Gemcitabine 1000 mg and Docetaxel 37.5 mg will be instilled intravesically weekly for  6 weeks with a maximum of 2 cycles. Avoid drinking fluids 4 hours prior to instillation    Pretreatment:   Patient will complete CBC, RFP, Urinalysis, and Urine Culture every Friday prior to Tuesday Bladder instillation.  Hold treatment if patient experiences urinary frequency or urgency lasting > 24 hour, suprapubic pain, grade 3 dysuria, grade 2 gross hematuria (without clots) or grade 3 hematuria (with clots) lasting > 48 hours, or platelets are < 75,000/mm3    After administration:  Contact office for new or worsening signs and symptoms of urinary frequency, urgency, hematuria, dysuria, or myalgia.  Void in a seated position to avoid splashing of urine  Flush toilet twice after first void  Wash hands thoroughly with soap and water after urinating  Drink plenty of water in order to flush the bladder    Patient will return to [facility] for monthly maintenance Gemcitabine/Docetaxel intravesical instillations x 1 year (07/2024) All questions and concerns were addressed. Patient verbalizes understanding and has no other questions at this time.     Surveillance cystoscopies q3 months until 3/2025, q6 until 3/2028, and yearly after no recurrences.   Cystoscopy with Dr. Frausto 05/02/2024    Charleen Sutton-- ROGERS DELACRUZ  Office Phone: [phone]

## 2024-06-19 DIAGNOSIS — J44.9 CHRONIC OBSTRUCTIVE PULMONARY DISEASE, UNSPECIFIED COPD TYPE (MULTI): ICD-10-CM

## 2024-06-19 RX ORDER — FLUTICASONE FUROATE, UMECLIDINIUM BROMIDE AND VILANTEROL TRIFENATATE 100; 62.5; 25 UG/1; UG/1; UG/1
1 POWDER RESPIRATORY (INHALATION) DAILY
Qty: 60 EACH | Refills: 5 | Status: SHIPPED | OUTPATIENT
Start: 2024-06-19

## 2024-06-19 RX ORDER — ALBUTEROL SULFATE 0.83 MG/ML
2.5 SOLUTION RESPIRATORY (INHALATION) EVERY 6 HOURS PRN
Qty: 75 ML | Refills: 5 | Status: SHIPPED | OUTPATIENT
Start: 2024-06-19 | End: 2025-06-19

## 2024-07-25 ENCOUNTER — APPOINTMENT (OUTPATIENT)
Dept: RADIOLOGY | Facility: CLINIC | Age: 69
End: 2024-07-25
Payer: MEDICARE

## 2024-07-25 DIAGNOSIS — N52.9 ERECTILE DYSFUNCTION, UNSPECIFIED ERECTILE DYSFUNCTION TYPE: ICD-10-CM

## 2024-07-26 RX ORDER — TADALAFIL 20 MG/1
TABLET ORAL
Qty: 30 TABLET | Refills: 0 | Status: SHIPPED | OUTPATIENT
Start: 2024-07-26

## 2024-08-01 ENCOUNTER — OFFICE VISIT (OUTPATIENT)
Dept: PULMONOLOGY | Facility: HOSPITAL | Age: 69
End: 2024-08-01
Payer: MEDICARE

## 2024-08-01 ENCOUNTER — HOSPITAL ENCOUNTER (OUTPATIENT)
Dept: RADIOLOGY | Facility: HOSPITAL | Age: 69
Discharge: HOME | End: 2024-08-01
Payer: MEDICARE

## 2024-08-01 ENCOUNTER — HOSPITAL ENCOUNTER (OUTPATIENT)
Dept: RESPIRATORY THERAPY | Facility: HOSPITAL | Age: 69
Discharge: HOME | End: 2024-08-01
Payer: MEDICARE

## 2024-08-01 VITALS
OXYGEN SATURATION: 92 % | HEART RATE: 80 BPM | DIASTOLIC BLOOD PRESSURE: 77 MMHG | TEMPERATURE: 97.2 F | WEIGHT: 230 LBS | SYSTOLIC BLOOD PRESSURE: 122 MMHG | BODY MASS INDEX: 33 KG/M2

## 2024-08-01 DIAGNOSIS — R06.00 DYSPNEA, UNSPECIFIED TYPE: ICD-10-CM

## 2024-08-01 DIAGNOSIS — C34.90 MALIGNANT NEOPLASM OF LUNG, UNSPECIFIED LATERALITY, UNSPECIFIED PART OF LUNG (MULTI): ICD-10-CM

## 2024-08-01 DIAGNOSIS — J44.9 CHRONIC OBSTRUCTIVE PULMONARY DISEASE, UNSPECIFIED COPD TYPE (MULTI): ICD-10-CM

## 2024-08-01 DIAGNOSIS — J44.9 CHRONIC OBSTRUCTIVE PULMONARY DISEASE, UNSPECIFIED COPD TYPE (MULTI): Primary | ICD-10-CM

## 2024-08-01 PROCEDURE — 94618 PULMONARY STRESS TESTING: CPT

## 2024-08-01 PROCEDURE — 99215 OFFICE O/P EST HI 40 MIN: CPT | Performed by: NURSE PRACTITIONER

## 2024-08-01 PROCEDURE — 3078F DIAST BP <80 MM HG: CPT | Performed by: NURSE PRACTITIONER

## 2024-08-01 PROCEDURE — 1159F MED LIST DOCD IN RCRD: CPT | Performed by: NURSE PRACTITIONER

## 2024-08-01 PROCEDURE — 4010F ACE/ARB THERAPY RXD/TAKEN: CPT | Performed by: NURSE PRACTITIONER

## 2024-08-01 PROCEDURE — 71046 X-RAY EXAM CHEST 2 VIEWS: CPT

## 2024-08-01 PROCEDURE — 3074F SYST BP LT 130 MM HG: CPT | Performed by: NURSE PRACTITIONER

## 2024-08-01 PROCEDURE — 1126F AMNT PAIN NOTED NONE PRSNT: CPT | Performed by: NURSE PRACTITIONER

## 2024-08-01 PROCEDURE — 71046 X-RAY EXAM CHEST 2 VIEWS: CPT | Performed by: RADIOLOGY

## 2024-08-01 ASSESSMENT — ENCOUNTER SYMPTOMS
HEADACHES: 0
PALPITATIONS: 0
NAUSEA: 0
EYE PAIN: 0
ABDOMINAL PAIN: 0
FATIGUE: 0
NUMBNESS: 1
VOMITING: 0
RHINORRHEA: 0
JOINT SWELLING: 0
SLEEP DISTURBANCE: 1
ARTHRALGIAS: 0
WEAKNESS: 0
BACK PAIN: 0
VOICE CHANGE: 0
NERVOUS/ANXIOUS: 0
MYALGIAS: 0
FEVER: 0
DIARRHEA: 0
DIZZINESS: 0
AGITATION: 0
SINUS PRESSURE: 0

## 2024-08-01 ASSESSMENT — PAIN SCALES - GENERAL: PAINLEVEL: 0-NO PAIN

## 2024-08-01 NOTE — PROGRESS NOTES
Patient: Ravin Camacho    [id number]  : 1955 -- AGE 69 y.o.    Provider: JAYME Maldonado-CNP     Location [facility]   Service Date: 2024              [facility]  Follow up visit note      HISTORY OF PRESENT ILLNESS       HISTORY OF PRESENT ILLNESS   Mr. Camacho is a 69 year old male former smoker with COPD and h/o of lung cancer (s/p RUL lobectomy in ) who presents today for follow-up. Last seen in clinic on 21 for a prebronch visit - 21 reoccurrence of adenocarcinoma s/p SBRT in 2021. Last seen in clinic on 24.     Since last visit he has had issued with his chemo for his bladder cancer. He continues to use his Trelegy daily and PRN albuterol 4-5 x a day. He states after his 11th chemo in May - he has had issues since. He sees him a week from today -- if cancer shows back up they are going to remove his bladder. His chemo goes into his bladder. He states after his chemo he felt he had a chills, fatigue, decreased appetite, and shortness of breath. He states most of these symptoms resolved after a day or two except for the shortness of breath. He states his breathing has never returned back to where it was. He did do the nocturnal oximetry. He had to be on 2L lying flat for his most recent CAT scan.  He feels the 2L at night has been helpful.  He has an occasional cough -- [place] post nasal drip in the morning. He does notice some wheezing.  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 He has SOB with lying flat, otherwise does not have SOB at rest. He states his SOB with lying back has been worse over the last 2 months. He denies any allergies, GERD, or chest pain. He has decreased appetite and early satiety- has not been eating as much.      CAT today 24: Since last visit he has been doing ok. He was able to get the trelegy - not sure if its working well. He has been using his rescue 3-4 x per  day - usually before exercise/ before bed/ waking up in the morning.  He was able to get the nebulizer machine, but does not have the solution to go in it. He feels his breathing feels about the same. He has a clearing cough every so often. He will bring up mucous in the morning - usually clear. He has noticed some intermittent wheezing. He feels his CHAPA is about the same. He denies any SOB at rest, GERD, or CP. He has some post nasal drip and sneezing for a few days - felt like maybe a slight allergy to something. He did not feel it was a cold. Chemo for bladder cancer - done in June. He was following with Dr. Holder with rad/onc - recently left . He has an upcoming appt with Dr. Bowman. He has not been watching his pulse ox - does not have one at home. He states his wife states he only snores when he is on his back. He has not had a sleep study and states he sleeps fine.   CAT today 16    10/19/23: He states things have been going pretty crappy. He has had bladder cancer twice now over the last year and a half. Currently on chemo for this. He has been using his Anoro daily - not sure how much it is helping. He has spinal stenosis so he can't play golf anymore.  He is asking about pulmonary rehab. He is using his albuterol HFA 3-4 x a day.  He does not have a nebulizer machine. He has been on chemo for 3-4 months - he is not sure if they could have side effects. He has a slight cough - sometimes productive with clear mucous (usually more productive first thing in the morning). He will notice wheezing at times - notices when he lays down. He has CHAPA with projects around the house. He states he will work for 3-4 minutes and then take a 5 minute break. He feels he recovers quick, but has to take frequent breaks. He denies any SOB at rest, GERD, or CP.  He denies any runny nose/ nasal congestion -- maybe some very mild throat clearing, but not botheresome. He got his flu shot, RSV shot, and COVID booster last week.     CAT today 16 // ACT today 14     7/5/23: Since last visit he feels his breathing is worse. He is still using his anoro daily - states if he gets sick he ends up with a bad cough for a long time. He has CHAPA with going up the stairs/ taking the garbage out. He states he wishes the anoro worked better. He tried spiriva -- states the anoro was better. He will at times notice wheezing. He has PRN albuterol that he will use 2-3 x a day. He uses his albuterol more proactively before exercise. He denies cough except when he is sick. He denies any SOB at rest, allergies, GERD, or chest pain.   CAT today 14     5/20/21: Since last visit he started spiriva started over the last few weeks. He felt the anoro may have worked a little better, but he wants to give it a little more time. He has a little bit of a cough in the morning. He has wheezing sporadically more so when he is lying down. CHAPA is still doing better than previous. He feels he back slide a little with the inhaler switch   He denies any SOB at rest, allergies, GERD, CP, or snoring.     4/14/21: Since last visit his breathing has been doing better with weight loss. He is not sure if the anoro is helping. He usually uses his rescue before exercise, but rarely needs it PRN. His CHAPA with exertion is much better compared to previous. He has lost 45lbs in the last 7-8 months. He previously had CHAPA with going up steps, but now is able to walk a mile before he get SOB. He has a dry cough intermittently that is rarely productive. He had wheezing previously that was especially bothersome to his wife at night. He denies any SOB at rest, allergies, GERD, CP, or snoring.     12/3/18: Mr. Camacho reports a URI in October 2018 for which he was given a Z-pack. He has been having CHAPA for 1-2 years now.  denies fevers, chills, or nights sweats  +CHAPA  denies shortness of breath at rest,   mMRC Dyspnea Score = 2  +Cough, occasional wheeze  denies hemoptysis, or stridor  denies  chest pain, orthopnea, or lower extremity edema  +weight gain  denies nausea, vomiting, abdominal pain, odynophagia, dysphagia, heartburn, anorexia, or weight loss  denies sinus congestion, post-nasal drip, epistaxis, or hoarseness of voice  denies snoring, apneas, excessive daytime somnolence, napping    Inhalers / Nebulized medications / Oxygen:    Anoro   Albuterol    Since the last visit there have been no changes to the past medical history, past surgical history, social history, or family history.      ALLERGIES AND MEDICATIONS     ALLERGIES  No Known Allergies    MEDICATIONS  Current Outpatient Medications   Medication Sig Dispense Refill    albuterol 2.5 mg /3 mL (0.083 %) nebulizer solution TAKE 3 ML (2.5 MG) BY NEBULIZATION EVERY 6 HOURS IF NEEDED FOR WHEEZING OR SHORTNESS OF BREATH. 75 mL 5    albuterol 90 mcg/actuation inhaler TAKE 2 PUFFS BY MOUTH EVERY 4 TO 6 HOURS AS NEEDED 18 g 11    atorvastatin (Lipitor) 20 mg tablet TAKE 1 TABLET BY MOUTH EVERY DAY 90 tablet 3    blood sugar diagnostic (Blood Glucose Test) strip 1 strip by in vitro route once daily.      ergocalciferol (Vitamin D-2) 1.25 MG (71147 UT) capsule TAKE 1 CAPSULE BY MOUTH ONE TIME PER WEEK 12 capsule 2    lancets (Fingerstix Lancets) misc once daily.      losartan (Cozaar) 25 mg tablet TAKE 1 TABLET BY MOUTH EVERY  tablet 2    meloxicam (Mobic) 15 mg tablet TAKE 1 TABLET EVERY DAY AS NEEDED 90 tablet 1    metFORMIN  mg 24 hr tablet TAKE 2 TABLETS BY MOUTH TWICE A  tablet 3    multivitamin with minerals (multivitamin with folic acid) tablet Take 1 tablet by mouth once daily.      tadalafil (Cialis) 5 mg tablet Take 1 tablet (5 mg) by mouth once daily. 90 tablet 3    Trelegy Ellipta 100-62.5-25 mcg blister with device INHALE 1 PUFF ONCE DAILY. RINSE MOUTH WITH WATER AFTER USE TO REDUCE AFTERTASTE AND INCIDENCE OF CANDIDIASIS. DO NOT SWALLOW. 60 each 5    docusate sodium (Colace) 100 mg capsule Take 1 capsule (100 mg)  by mouth 2 times a day.      glyBURIDE (Diabeta) 5 mg tablet Take 1 tablet (5 mg) by mouth once daily.      lisinopril 10 mg tablet Take 1 tablet (10 mg) by mouth once daily.      pioglitazone (Actos) 30 mg tablet Take 1 tablet (30 mg) by mouth once daily.      tadalafil 20 mg tablet TAKE ONE TABLET BY MOUTH once DAILY AS NEEDED for erectile dysfunction. take one hour before activity (Patient not taking: Reported on 8/1/2024) 30 tablet 0    traMADol (Ultram) 50 mg tablet 1 tablet (50 mg).       No current facility-administered medications for this visit.         PAST HISTORY     PAST MEDICAL HISTORY  - HLD   - HTN   - lung cancer -  (s/p RUL lobectomy in 1999) who presents today for follow-up. Reoccurrence of adenocarcinoma s/p SBRT in 8/2021.  - DMII   - OA   - bladder cancer - s/p surgery in 3/2022- then s/p 6 weeks of BCG then quarterly. Kidney stone s/p surgery 3/2023- this delayed his BCG. He thinks it reoccurred related to delayed BCG -- currently on chemo - gemcitabine/ docetaxel (instilled into bladder).       PAST SURGICAL HISTORY  Past Surgical History:   Procedure Laterality Date    COLONOSCOPY  12/01/2016    Complete Colonoscopy    HAND SURGERY  12/01/2016    Hand Surgery                                                                                                                                                          LUNG LOBECTOMY  05/22/2015    Lung Lobectomy    OTHER SURGICAL HISTORY  10/31/2019    Knee replacement    OTHER SURGICAL HISTORY  09/18/2022    Epidural steroid injection    OTHER SURGICAL HISTORY  09/18/2022    Bronchoscopy       IMMUNIZATION HISTORY  Immunization History   Administered Date(s) Administered    Flu vaccine, quadrivalent, high-dose, preservative free, age 65y+ (FLUZONE) 10/26/2022    Influenza, High Dose Seasonal, Preservative Free 09/23/2020, 09/29/2021    Influenza, seasonal, injectable 10/12/2023    Moderna SARS-CoV-2 Vaccination 10/12/2023    Pfizer COVID-19 vaccine,  bivalent, age 12 years and older (30 mcg/0.3 mL) 10/26/2022    Pfizer Purple Cap SARS-CoV-2 03/03/2021, 03/24/2021, 10/04/2021    Pneumococcal conjugate vaccine, 13-valent (PREVNAR 13) 09/23/2020    Pneumococcal conjugate vaccine, 20-valent (PREVNAR 20) 07/19/2023    Pneumococcal polysaccharide vaccine, 23-valent, age 2 years and older (PNEUMOVAX 23) 12/03/2018    RSV, 60 Years And Older (AREXVY) 10/12/2023    Tdap vaccine, age 7 year and older (BOOSTRIX, ADACEL) 04/23/2015    Zoster vaccine, recombinant, adult (SHINGRIX) 06/28/2021, 10/18/2021    Zoster, live 04/26/2015       SOCIAL HISTORY / OCCUPATIONAL/ENVIRONMENTAL HISTORY   Tobacco: Former smoker > quit 1999, smoked 2 ppd prior since age 16 (~45 pack years)   Occupation: former  / EMT,       FAMILY HISTORY  Family History   Problem Relation Name Age of Onset    Hypertension Mother      Lung cancer Father      Cancer Other Multiple Family Members       +cancer   No family history of lung diseases    RESULTS/DATA     Pulmonary Function Test Results       - 11/18/2015 - FEV1/FVC = 45% (LLN= 66%) FEV1 1.48 L (41% predicted)/ FVC 3.65 (81%)   - 2/26/19 - FEV1/FVC 0.48/ FEV1 1.65 (48%)/ FVC 3.65 (81%)  - 4/14/21: FEV1/FVC 0.43/ FEV1 1.75 (52%)/ FVC 4.09 (93%)/ TLC 6.79 (95%)/ DLCO 74%   - 10/19/23 - FEV1/FVC 0.36/FEV1 1.24 (38%)/ FVC 3.45 (80%)/ TLC 6.70 (94%)/ DLCO 59%     6MWT:    - 2/26/19 - 515m 94 -> 87% on RA, PADMA 2   - 10/19/23 - 351m 95 -> 89% on RA, PADMA 4     O2 desat: 2/26/19 95% on RA at rest -> 88% on RA with exertion, 96% on 2L with exertion     Chest Radiograph     No results found for this or any previous visit from the past 2000 days.      Chest CT Scan     --- 2/10/2017 -> s/p RUL lobectomy, emphysema, small bilateral peripheral lung nodules  --- CT chest 3/11/19 - Stable small upper lobe pulmonary nodules. Two new 2-3 mm pulmonary densities in the ASHLEY. post surgical changes consistent with a right upper lobectomy.   - 4/26/21: Significant  interval enlargement of a now 18 mm spiculated left upper lobe pulmonary nodule abutting the leftward aspect of the mediastinum.  --- 6/24/21 - Minimal increase in dominant medial left upper lobe mediastinal pleural based pulmonary nodule, which corresponds to intense hypermetabolic activity on recent PET-CT, concerning for primary lung neoplasm. Tissue sampling is recommended. Postsurgical changes status post right upper lobectomy without local evidence of recurrence. No new pulmonary nodules. Multiple subcentimeter mediastinal lymph nodes as described above,  which appears stable compared to chest CT dated 04/26/2021. No new zita disease within chest. Background mild emphysematous changes. Mild to moderate coronary artery calcifications are seen. The study is not optimized for evaluation of coronary arteries. Additional stable chronic findings as above.  --- 2/8/22- Chest/abdomen/ pelvis: CHEST: Status post right upper lobectomy, with unchanged right lung base  pleural thickening and calcification which may be sequela of treatment or prior pleural inflammatory process. No pleural effusion. No pleural nodularity. The previously noted left upper lung malignancy is significantly smaller, difficult to even measure, about 5 mm oblong nodular focus remains in the area of treated lesion, which may be scar. Continued  attention suggested. No evidence of metastatic disease in the chest.  ABDOMEN-PELVIS: There is an enhancing polypoid 3.5 cm mass in the left posterior bladder wall overlying the left UVJ, with some calcification along the superficial margin. This is highly suspicious for a bladder malignancy. Follow-up with Urology is recommended. No hydronephrosis or hydroureter at this time. No pelvic adenopathy. There is an 11 mm left para-aortic node noted which is indeterminate and if has not substantially changed in size from the prior PET-CT. No other abdominal adenopathy. Some small bilateral adrenal nodules are  unchanged, cannot be characterized on this exam, most likely small adenomas. Unchanged hepatic hypodensities, the larger of which can be characterized as simple cysts, some are too small to characterize. Bilateral nonobstructing nephrolithiasis and other incidental  nonacute findings as above.  -- 8/30/22- Postoperative changes compatible with right upper lobectomy and post radiation changes within the left upper lobe without evidence of local recurrence. Interval complete resolution/resection of the posterior bladder  mass. Interval development of a solid 2 mm right middle lobe and a solid 3 mm left lower lobe pulmonary nodules. Attention on follow-up is recommended. Pancreatic head and uncinate process cystic lesions are unchanged  compared to CT chest 02/07/2022. A pancreatic MRI with MRCP is recommended for further characterization. Nonspecific left periaortic lymphadenopathy without significant metabolic activity on prior PET-CT. Attention on follow-up is recommended. Unchanged left adrenal gland nodules without significant metabolic activity on prior PET-CT. Attention on follow-up is recommended. Bilateral nonobstructing nephrolithiasis. Prostatomegaly. Correlate with serum PSA.  --- 2/27/23 - A few mediastinal lymph nodes have enlarged compared to 6 months ago, the dominant node from 8 mm to 11 mm short axis in the subcarinal region. Metastatic disease is possible. Consider PET/CT for further evaluation. Previously described new pulmonary nodules have resolved. No new suspicious pulmonary nodules.  --- 6/5/23 - Status post right pneumonectomy. [ RUL lobectomy ]. Bilateral lung micronodules, stable since 2/23. Nonobstructing right renal calculus.    PET:   - 5/14/21 - Noncalcified, paramediastinal pulmonary lesion in the ASHLEY demonstrates abnormal hypermetabolic activity and is highly concerning for a neoplastic process. No evidence of hypermetabolic mediastinal lymphadenopathy or  metastatic disease in the  rest of body. Postsurgical changes consistent with right upper lobectomy, with  no evidence of hypermetabolic recurrent disease along the surgical margins. Hypermetabolic soft tissue focus in the left parotid gland, which may represent a Warthin's tumor. Ultrasound is recommended for further characterization.  - 6/21/23 -Postsurgical changes in the right upper lobe without PET evidence of recurrent disease. Redemonstration of faint FDG avid soft tissue thickening in the left upper lobe, favored to represent post radiation changes. No PET  evidence of local recurrence. No PET evidence of locoregional zita or distant metastasis. FDG avid focus in the posterior apical prostate gland, nonspecific, correlate clinically. 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 Post radiation change seen in the left lung apex with slightly more coalescent soft tissue possibly representing radiation fibrosis but progressive tumor is also consideration.. Follow-up is advised to exclude any local progression of disease. PET-CT may be of diagnostic benefit to evaluate vascular calcification.      Echocardiogram     Echocardiogram - 8/11/23 -   CONCLUSIONS:  1. Left ventricular systolic function is normal with a 65-70% estimated ejection fraction.  RA normal size. The right ventricle is mildly enlarged - There is normal right ventricular global systolic function.       REVIEW OF SYSTEMS     REVIEW OF SYSTEMS  Review of Systems   Constitutional:  Negative for fatigue and fever.   HENT:  Negative for congestion, postnasal drip, rhinorrhea, sinus pressure and voice change.    Eyes:  Negative for pain and visual disturbance.   Cardiovascular:  Negative for chest pain, palpitations and leg swelling.   Gastrointestinal:  Negative for abdominal pain, diarrhea, nausea and vomiting.   Endocrine:  Negative for cold intolerance and heat intolerance.   Musculoskeletal:  Negative for arthralgias, back pain, joint swelling and myalgias.   Skin:  Negative for rash.   Neurological:  Positive for numbness. Negative for dizziness, weakness and headaches.   Psychiatric/Behavioral:  Positive for sleep disturbance. Negative for agitation. The patient is not nervous/anxious.          PHYSICAL EXAM     VITAL SIGNS: /77   Pulse 80   Temp 36.2 °C (97.2 °F)   Wt 104 kg (230 lb)   SpO2 92% Comment: RA  BMI 33.00 kg/m²      CURRENT WEIGHT: [unfilled]  BMI: [unfilled]  PREVIOUS WEIGHTS:  Wt Readings from Last 3 Encounters:   08/01/24 104 kg (230 lb)   06/04/24 110 kg (243 lb 2.7 oz)   01/25/24 114 kg (251 lb)       Physical Exam  Vitals reviewed.   Constitutional:       General: He is not in acute distress.     Appearance: Normal appearance. He is not ill-appearing or toxic-appearing.   HENT:      Head: Normocephalic.      Nose: No rhinorrhea.   Cardiovascular:      Rate and Rhythm: Normal rate and regular rhythm.      Heart sounds: Normal heart sounds.   Pulmonary:      Effort: Pulmonary effort is normal. No respiratory distress.      Breath sounds: Normal breath sounds. No stridor. No wheezing, rhonchi or rales.   Abdominal:      General: Abdomen is flat.   Musculoskeletal:         General: Normal range of motion.      Right lower leg: No edema.      Left lower leg: No edema.   Skin:     General: Skin is warm and dry.      Nails: There is no clubbing.   Neurological:      General: No focal deficit present.      Mental Status: He is alert and oriented to person, place, and time.   Psychiatric:         Mood and Affect: Mood normal.         Behavior: Behavior normal.         Judgment: Judgment normal.         ASSESSMENT/PLAN     1. COPD: Previous PFTs with severe, but now improved to moderate obstruction. Dyspnea on exertion much improved. 2019 6MWT - 89% on RA with ambulation. Echo with normal EF - RV slightly enlarged.  Started on 2L nocturnal oximetry.   - continue trelegy - 1 inhalation daily   - continue albuterol HFA 2 puffs or albuterol nebulizers every 4-6 hours as needed   - heart healthy diet   - continue to great job getting active!!   - discussed pulmonary rehab - wants to work out   - get pulse ox to check at home   - will get CXR today - CXR with what appears to be an interstitial infiltrate, which could be volume or pneumonitis - will get CT chest to further evaluate. CXR reviewed and discussed with Dr. Sanderson.   - will get updated echo given SOB with lying flat     2. Chronic hypoxic respiratory failure: nocturnal oximetry showed he needs 2L at night. O2 desat today shows he needs 4l with exertion   - continue 2L at night - and 4L with exertion  - will order portable oxygen concentrator     3. Lung cancer: CT in 3/2019 with no evidence of reoccurrence. CT in 4/2021 with new ASHLEY nodule + adenocarcinoma s/p SBRT in 8/2021.   - continue to monitor    4.  Bladder cancer: completed a year of chemo- upcoming appt with Dr. Frausto     Thank you for visiting the Pulmonary clinic today!   Return to clinic 2-3 months  or sooner if needed   Suly Maria CNP  My office -  [phone]- Pallavi is my nurse.   Radiology scheduling [phone]   Appointment scheduling [phone]   Pulmonary function testing - [phone]

## 2024-08-01 NOTE — PATIENT INSTRUCTIONS
1. COPD: Previous PFTs with severe, but now improved to moderate obstruction. Dyspnea on exertion much improved. 2019 6MWT - 89% on RA with ambulation. Echo with normal EF - RV slightly enlarged. Started on 2L nocturnal oximetry.   - continue trelegy - 1 inhalation daily   - continue albuterol HFA 2 puffs or albuterol nebulizers every 4-6 hours as needed   - heart healthy diet   - continue to great job getting active!!   - discussed pulmonary rehab - wants to work out   - get pulse ox to check at home   - will get CXR today - has CT abdomen scheduled in September   - will get updated echo given SOB with lying flat     2. Chronic hypoxic respiratory failure: nocturnal oximetry showed he needs 2L at night. O2 desat today shows he needs 4l with exertion   - continue 2L at night - and 4L with exertion  - will order portable oxygen concentrator     3. Lung cancer: CT in 3/2019 with no evidence of reoccurrence. CT in 4/2021 with new ASHLEY nodule + adenocarcinoma s/p SBRT in 8/2021.   - continue to monitor    4.  Bladder cancer: completed a year of chemo- upcoming appt with Dr. Frausto     Thank you for visiting the Pulmonary clinic today!   Return to clinic 2-3 months  or sooner if needed   Suly Maria CNP  My office -  [phone]- Pallavi is my nurse.   Radiology scheduling [phone]   Appointment scheduling [phone]   Pulmonary function testing - [phone]

## 2024-08-07 NOTE — PROGRESS NOTES
FUV    Last seen - 5/2/24     HISTORY OF PRESENT ILLNESS:   Ravin Camacho is a 69 y.o. male who is being seen today for continue cystoscopic surveillance.    PAST MEDICAL HISTORY:  Past Medical History:   Diagnosis Date    Acute bronchitis with bronchospasm 04/05/2023    Acute otitis media 04/05/2023    Cluster headache 04/05/2023    Cough 04/05/2023    Hip pain, right 04/05/2023    Lattice degeneration of both retinas 04/05/2023    Personal history of irradiation 09/18/2022    History of radiation therapy    Pure hypercholesterolemia, unspecified     High cholesterol    Serous otitis media 04/05/2023    Stiffness of right knee, not elsewhere classified 04/05/2023    Suspected UTI 04/05/2023    UTI symptoms 07/20/2023       PAST SURGICAL HISTORY:  Past Surgical History:   Procedure Laterality Date    COLONOSCOPY  12/01/2016    Complete Colonoscopy    HAND SURGERY  12/01/2016    Hand Surgery                                                                                                                                                          LUNG LOBECTOMY  05/22/2015    Lung Lobectomy    OTHER SURGICAL HISTORY  10/31/2019    Knee replacement    OTHER SURGICAL HISTORY  09/18/2022    Epidural steroid injection    OTHER SURGICAL HISTORY  09/18/2022    Bronchoscopy        ALLERGIES:   No Known Allergies     MEDICATIONS:   Current Outpatient Medications   Medication Instructions    albuterol 90 mcg/actuation inhaler TAKE 2 PUFFS BY MOUTH EVERY 4 TO 6 HOURS AS NEEDED    albuterol 2.5 mg, nebulization, Every 6 hours PRN    atorvastatin (LIPITOR) 20 mg, oral, Daily    blood sugar diagnostic (Blood Glucose Test) strip 1 strip, in vitro, Daily    docusate sodium (COLACE) 100 mg, oral, 2 times daily    ergocalciferol (Vitamin D-2) 1.25 MG (87644 UT) capsule TAKE 1 CAPSULE BY MOUTH ONE TIME PER WEEK    glyBURIDE (Diabeta) 5 mg tablet 1 tablet, oral, Daily    lancets (Fingerstix Lancets) misc Daily    lisinopril 10 mg  "tablet 1 tablet, oral, Daily    losartan (Cozaar) 25 mg tablet TAKE 1 TABLET BY MOUTH EVERY DAY    meloxicam (Mobic) 15 mg tablet TAKE 1 TABLET EVERY DAY AS NEEDED    metFORMIN XR (GLUCOPHAGE-XR) 1,000 mg, oral, 2 times daily    multivitamin with minerals (multivitamin with folic acid) tablet 1 tablet, oral, Daily    pioglitazone (Actos) 30 mg tablet 1 tablet, oral, Daily    tadalafil (CIALIS) 5 mg, oral, Daily    tadalafil 20 mg tablet TAKE ONE TABLET BY MOUTH once DAILY AS NEEDED for erectile dysfunction. take one hour before activity    traMADol (ULTRAM) 50 mg    Trelegy Ellipta 100-62.5-25 mcg blister with device 1 puff, inhalation, Daily, Rinse mouth with water after use to reduce aftertaste and incidence of candidiasis. Do not swallow.        PHYSICAL EXAM:  There were no vitals taken for this visit.  Constitutional: Patient appears well-developed and well-nourished. No distress.    Pulmonary/Chest: Effort normal. No respiratory distress.   Abdominal: Soft, ND NT  : WNL  Musculoskeletal: Normal range of motion.    Neurological: Alert and oriented to person, place, and time.  Psychiatric: Normal mood and affect. Behavior is normal. Thought content normal.      Labs:  No results found for: \"TESTOSTERONE\"  Lab Results   Component Value Date    PSA 3.64 08/11/2023     No components found for: \"CBC\"  Lab Results   Component Value Date    CREATININE 1.10 05/31/2024     No components found for: \"TESTOTMS\"  Lab Results   Component Value Date    TESTF 64.3 10/28/2021     Imaging:  - Renal US on 4/18/24 demonstrated No bladder mass. Non obstructing bilateral nephrolithiasis without hydronephrosis. 1.4cm stone in the left kidney, will continue to follow with Dr. Ambriz for treatment on this.  - Results reviewed with patient. Patient reassured.      Discussion:  Doing well, no complaints. Pt has completed 12 months of maintenance of Gemcitabine/Docetaxel intravesical instillations by Charleen Sutton NP, last was on 6/4/24. " Denies any dysuria, hematuria, bothersome urinary frequency, urgency, or flank pain.    The cystoscopy was completed today due to T1 papillary urothelial carcinoma, high grade and demonstrated no tumors, stones or lesions. Cytology sent. 1 abx given to patient in clinic today. Due to enlarged prostate I offered to send a referral to Dr. Navarro to discuss minimally invasive procedures. Patient instructed to follow up in 3 months for continued surveillance.    Cystoscopic surveillance schedule: q3 months until 3/2025, q6 until 3/2028, and yearly after no recurrences.  Assessment:      No diagnosis found.  T1 papillary urothelial carcinoma, high grade     Ravin Camacho is a 69 y.o. male here for FUV     Plan:   Will follow up in 3 months for continue cystoscopic surveillance.  Will send referral to Dr. Navarro to discuss minimally invasive procedures.    All questions and concerns were addressed. Patient verbalizes understanding and has no other questions at this time.     Scribe Attestation  By signing my name below, IJane Scribe   attest that this documentation has been prepared under the direction and in the presence of Mello Frausto MD.

## 2024-08-08 ENCOUNTER — PROCEDURE VISIT (OUTPATIENT)
Dept: UROLOGY | Facility: HOSPITAL | Age: 69
End: 2024-08-08
Payer: MEDICARE

## 2024-08-08 VITALS — DIASTOLIC BLOOD PRESSURE: 81 MMHG | HEART RATE: 80 BPM | SYSTOLIC BLOOD PRESSURE: 142 MMHG

## 2024-08-08 DIAGNOSIS — C67.9 MALIGNANT NEOPLASM OF URINARY BLADDER, UNSPECIFIED SITE (MULTI): Primary | ICD-10-CM

## 2024-08-08 LAB
POC APPEARANCE, URINE: CLEAR
POC BILIRUBIN, URINE: ABNORMAL
POC BLOOD, URINE: NEGATIVE
POC COLOR, URINE: YELLOW
POC GLUCOSE, URINE: NEGATIVE MG/DL
POC KETONES, URINE: NEGATIVE MG/DL
POC LEUKOCYTES, URINE: NEGATIVE
POC NITRITE,URINE: NEGATIVE
POC PH, URINE: 6 PH
POC PROTEIN, URINE: ABNORMAL MG/DL
POC SPECIFIC GRAVITY, URINE: >=1.03
POC UROBILINOGEN, URINE: 1 EU/DL

## 2024-08-08 PROCEDURE — 81003 URINALYSIS AUTO W/O SCOPE: CPT | Performed by: UROLOGY

## 2024-08-08 PROCEDURE — 52000 CYSTOURETHROSCOPY: CPT | Performed by: UROLOGY

## 2024-08-09 ENCOUNTER — HOSPITAL ENCOUNTER (OUTPATIENT)
Dept: CARDIOLOGY | Facility: HOSPITAL | Age: 69
Discharge: HOME | End: 2024-08-09
Payer: MEDICARE

## 2024-08-09 DIAGNOSIS — R06.02 SHORTNESS OF BREATH: ICD-10-CM

## 2024-08-09 DIAGNOSIS — J44.9 CHRONIC OBSTRUCTIVE PULMONARY DISEASE, UNSPECIFIED COPD TYPE (MULTI): ICD-10-CM

## 2024-08-09 DIAGNOSIS — R06.00 DYSPNEA, UNSPECIFIED TYPE: ICD-10-CM

## 2024-08-09 LAB
AORTIC VALVE MEAN GRADIENT: 3 MMHG
AORTIC VALVE PEAK VELOCITY: 1.29 M/S
AV PEAK GRADIENT: 6.7 MMHG
EJECTION FRACTION APICAL 4 CHAMBER: 68.3
EJECTION FRACTION: 65 %
LEFT ATRIUM VOLUME AREA LENGTH INDEX BSA: 20.4 ML/M2
LEFT VENTRICLE INTERNAL DIMENSION DIASTOLE: 5.17 CM (ref 3.5–6)
MITRAL VALVE E/A RATIO: 0.86
RIGHT VENTRICLE FREE WALL PEAK S': 16.6 CM/S
TRICUSPID ANNULAR PLANE SYSTOLIC EXCURSION: 3 CM

## 2024-08-09 PROCEDURE — 93306 TTE W/DOPPLER COMPLETE: CPT | Performed by: STUDENT IN AN ORGANIZED HEALTH CARE EDUCATION/TRAINING PROGRAM

## 2024-08-09 PROCEDURE — 93306 TTE W/DOPPLER COMPLETE: CPT

## 2024-08-16 ENCOUNTER — HOSPITAL ENCOUNTER (OUTPATIENT)
Dept: RADIOLOGY | Facility: CLINIC | Age: 69
Discharge: HOME | End: 2024-08-16
Payer: MEDICARE

## 2024-08-16 DIAGNOSIS — R06.00 DYSPNEA, UNSPECIFIED TYPE: ICD-10-CM

## 2024-08-16 PROCEDURE — 71250 CT THORAX DX C-: CPT

## 2024-08-19 ENCOUNTER — TELEPHONE (OUTPATIENT)
Dept: RADIATION ONCOLOGY | Facility: HOSPITAL | Age: 69
End: 2024-08-19
Payer: MEDICARE

## 2024-08-19 NOTE — TELEPHONE ENCOUNTER
Called pt to remind of appointment on 8/20/2024 at 4:00 Pt answered and will be present.    Pt is aware that the appointment is a phone/virtual visit, pt. understands that he/she doesn't have to show up in office.

## 2024-08-20 ENCOUNTER — HOSPITAL ENCOUNTER (OUTPATIENT)
Dept: RADIATION ONCOLOGY | Facility: HOSPITAL | Age: 69
Setting detail: RADIATION/ONCOLOGY SERIES
Discharge: HOME | End: 2024-08-20
Payer: MEDICARE

## 2024-08-20 DIAGNOSIS — C34.90 MALIGNANT NEOPLASM OF LUNG, UNSPECIFIED LATERALITY, UNSPECIFIED PART OF LUNG (MULTI): Primary | ICD-10-CM

## 2024-08-20 PROCEDURE — 99214 OFFICE O/P EST MOD 30 MIN: CPT | Performed by: NURSE PRACTITIONER

## 2024-08-20 ASSESSMENT — ENCOUNTER SYMPTOMS
COUGH: 1
UNEXPECTED WEIGHT CHANGE: 0
GASTROINTESTINAL NEGATIVE: 1
STRIDOR: 0
HEMATURIA: 0
ACTIVITY CHANGE: 0
CHOKING: 0
FLANK PAIN: 0
PSYCHIATRIC NEGATIVE: 1
HEMATOLOGIC/LYMPHATIC NEGATIVE: 1
DYSURIA: 0
DIFFICULTY URINATING: 0
FATIGUE: 0
NEUROLOGICAL NEGATIVE: 1
CHILLS: 0
FEVER: 0
MUSCULOSKELETAL NEGATIVE: 1
DIAPHORESIS: 0
CARDIOVASCULAR NEGATIVE: 1
APNEA: 0
APPETITE CHANGE: 0
WHEEZING: 0
CHEST TIGHTNESS: 0
SHORTNESS OF BREATH: 1

## 2024-08-20 NOTE — PROGRESS NOTES
Patient ID: [id number]     Ravin Camacho is a 69 y.o. male who was previously seen at the [facility] Department of Radiation Oncology for NSCLC.  He is s/p RUL lobectomy 1999 for NSCLC, now with stage 1A2 [id number] (by PET) 1.8 cm adenoca of ASHLEY poor PFTs > Definitive SBRT 50 Gy 5 fx completed 8/6/21.     History of presenting illness    Telehealth visit with Ravin Camacho today. Patient is a 69 y.o. male who presents today for follow up 3 years and 7 s/p SBRT to the ASHLEY. Patient was seen in March with PET CT. UVALDO on scan. Originally scheduled for CT and follow up with me in September. Patient saw Suly Maria 8/1/24. He was having worsening SOB and Xray showed infiltrates. CT scan ordered and was completed 8/16/24.  Energy poor. Appetite good. Weight stable. He endorses SOB and cough at baseline. He is now more SOB and struggles more when laying down. He is wearing 3LO2 pretty much continuously.  No fevers, back pain or chest pain.  Using inhalers and nebulizer as prescribed.  No neurological complaints. Patient is completed systemic therapy with Dr. Frausto for Bladder cancer in June. Recent follow up and cystoscopy in 8/8/24 showed no evidence of disease. He does have an enlarged prostate and was referred to DR. Navarro. Feels breathing was worse when on chemo. Tried to get omit last cycle. Dr. Frausto felt it was not related to chemo and he completed last cycle.  Denies urinary symptoms. Takes 5mg Cialis daily.    Review of systems:  Review of Systems   Constitutional:  Negative for activity change, appetite change, chills, diaphoresis, fatigue, fever and unexpected weight change.   HENT: Negative.     Respiratory:  Positive for cough and shortness of breath. Negative for apnea, choking, chest tightness, wheezing and stridor.    Cardiovascular: Negative.    Gastrointestinal: Negative.    Genitourinary:  Negative for decreased urine volume, difficulty urinating,  dysuria, flank pain and hematuria.   Musculoskeletal: Negative.    Skin: Negative.    Neurological: Negative.    Hematological: Negative.    Psychiatric/Behavioral: Negative.         Past Medical history  He  has a past surgical history that includes Lung lobectomy (05/22/2015); Hand surgery (12/01/2016); Colonoscopy (12/01/2016); Other surgical history (10/31/2019); Other surgical history (09/18/2022); and Other surgical history (09/18/2022).      Radiology results:     NM PET CT bone skull base to mid thigh 03/20/2024    Narrative  Interpreted By:  Aubrie Bhatt,  and Inocente Haley  STUDY:  NM PET CT SKULL BASE TO MID THIGH;  3/20/2024 10:22 am    INDICATION:  Signs/Symptoms:Follow up NSCLC s/p RUL lobectomy 1999 and ASHLEY  definitive SBRT 2021, recommended PET on last CT 12/23. Patient also  has history of bladder mass demonstrated invasive papillary  urothelial carcinoma high grade status post TURBT on March 2023,  completed BCG, now being considered for adjuvant chemotherapy.    COMPARISON:  CT chest without contrast on 12/18/2023  PET-CT on 06/21/2023    ACCESSION NUMBER(S):  [id number]    ORDERING CLINICIAN:  NAKUL LAWSON    TECHNIQUE:  DIVISION OF NUCLEAR MEDICINE  POSITRON EMISSION TOMOGRAPHY (PET-CT)    The patient received an intravenous dose of 13.1 mCi of Fluorine-18  fluorodeoxyglucose (FDG).  Positron emission tomographic (PET) images  from mid thigh to skull base were then acquired after a one hour  delay. Also acquired was a contemporaneous low dose non-contrast CT  scan performed for attenuation correction of PET images and anatomic  localization.  The PET and CT images were digitally fused for  display.  All images were acquired on a combined PET-CT scanner unit.  Some areas of FDG accumulation may be described in standardized  uptake value (SUV) units.    CODING:  Subsequent Treatment Strategy (PS)    CALIBRATION:  Dose Injection-to-Scan Interval (mins): 62 min  Mediastinal bloodpool SUV (normal  1.5-2.5): 2.5  Blood glucose: 167 mg/dL    FINDINGS:  HEAD AND NECK:  *No evidence of focal FDG avid lesion in the partially visualized  brain parenchyma, noting that evaluation is limited because of the  expected physiologic diffuse FDG uptake in the brain. *Again seen,  FDG avid lesion in the left parotid gland with SUV max of 6.2, as  compared to 6.7 seen on prior PET. *No FDG avid cervical  lymphadenopathy is present. *No evidence of paranasal sinus disease.  *Thyroid gland is unremarkable    CHEST:  * No focal FDG avid lesion is seen in the lung parenchyma.  * No evidence of FDG avid mediastinal, hilar or axillary  lymphadenopathy. * Postsurgical changes of right upper lobe  lobectomy, without abnormal FDG uptake in the postsurgical bed to  suggest disease recurrence. Post radiation changes seen within the  left upper lobe, with mild FDG avidity with SUV max of 2.4.    ABDOMEN AND PELVIS:  * No FDG avid lymphadenopathy in the abdomen or pelvis.  * Physiologic radiotracer uptake is present in the liver and spleen  with excretion into the bowel loops and the genitourinary tract. Few  liver cysts *Unremarkable bilateral adrenal glands  *There is similar focal FDG activity within the posterior apical  prostate gland, when compared to prior PET.    MUSCULOSKELETAL:  *No concerning FDG avid bone lesion throughout axial and appendicular  skeleton to suggest osseous metastasis.    Impression  1. Status post right upper lobectomy without PET evidence of  recurrent disease in the postsurgical bed.  2. Mild FDG activity within the left upper lobe, likely representing  post radiation changes, attention on follow-up study.  3. No PET evidence of zita or distant metastasis.  4. Similar FDG avid focus in the posterior apical prostate gland,  nonspecific. Correlate clinically.  5. 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    I  personally reviewed the image(s) / study and agree with the  findings and interpretation as stated. This study was interpreted at  [facility].    Signed by: Aubrie Bhatt 3/20/2024 8:28 PM  Dictation workstation:   [id number]   CT chest wo IV contrast 08/16/2024    Narrative  Interpreted By:  Matti Banuelos,  STUDY:  CT CHEST WO IV CONTRAST;  8/16/2024 11:39 am    INDICATION:  Signs/Symptoms:increased SOB post chemo. hx of lobectomy. CXR with  infiltrate.    COMPARISON:  12/18/2023    ACCESSION NUMBER(S):  [id number]    ORDERING CLINICIAN:  MARV HAYS    TECHNIQUE:  Helical data acquisition of the chest was obtained without the use of  IV contrast. Images were reformatted in axial, coronal, and sagittal  planes.    FINDINGS:  POTENTIAL LIMITATIONS OF THE STUDY:   Lack of IV contrast    HEART AND VESSELS:    There are atherosclerotic calcifications of the aorta and its  branches. Aorta is unchanged in course and caliber.    The heart is unchanged in size.    No pericardial effusion is seen.    MEDIASTINUM AND WILL, LOWER NECK AND AXILLA:  The visible portions of the thyroid are grossly unchanged in  appearance. Stable subcentimeter nonspecific hypoattenuating nodule  in the left thyroid lobe.    No evidence of thoracic lymphadenopathy by CT criteria.    Esophagus appears within normal limits as seen.    LUNGS AND AIRWAYS:  The trachea and central airways are patent. No endobronchial lesion.    Persistent soft tissue prominence in the left suprahilar region most  likely related to posttreatment/postradiation changes and is  unchanged. Postsurgical changes noted relating to a right upper  lobectomy. Emphysematous changes are noted. Mild areas of  atelectasis/scarring. No new areas of consolidation. No new pulmonary  nodule or mass. No sizable effusion or evidence of a pneumothorax.  Stable pleural thickening and calcified pleural plaque at the right  base.    UPPER ABDOMEN:  The  visualized subdiaphragmatic structures demonstrate no acute  abnormality. Stable hepatic cysts and left renal cyst. Right  nephrolithiasis.    CHEST WALL AND OSSEOUS STRUCTURES:  Degenerate changes. No acute process.    Impression  No evidence of an acute intrathoracic process. Unchanged findings as  above.    MACRO:  None.    Signed by: Matti Banuelos 8/20/2024 5:17 PM  Dictation workstation:   [id number]   Plan:  Assessment/Plan     69 year old male 3 years  s/p SBRT to the ASHLEY. Patient now with worsening SOB and requiring 3L of oxygen continuously. PET CT done on 3/20/24. Scan shows RT changes with no evidence of zita or distance mets. CT of chest done on 8/16/24. Scan is stable with no new disease or acute processes. Continues follow up with Dr. Frausto for bladder cancer as scheduled. Continue follow up with pulmonology and use inhalers and oxygen as prescribed. Patient to return to clinic in  6 months with CT of the chest prior. Instructed to call with any questions or concerns.       I performed this visit using real- time telehealth tools , including an audio/video or telephone connection between Ravin Camacho  , who is in their home and Ethel Oneal CNP at [facility].

## 2024-09-22 DIAGNOSIS — J42 CHRONIC BRONCHITIS, UNSPECIFIED CHRONIC BRONCHITIS TYPE (MULTI): ICD-10-CM

## 2024-09-24 RX ORDER — ALBUTEROL SULFATE 90 UG/1
2 INHALANT RESPIRATORY (INHALATION) EVERY 4 HOURS PRN
Qty: 18 G | Refills: 11 | Status: SHIPPED | OUTPATIENT
Start: 2024-09-24

## 2024-09-25 ENCOUNTER — APPOINTMENT (OUTPATIENT)
Dept: RADIOLOGY | Facility: CLINIC | Age: 69
End: 2024-09-25
Payer: MEDICARE

## 2024-09-27 ENCOUNTER — APPOINTMENT (OUTPATIENT)
Dept: UROLOGY | Facility: HOSPITAL | Age: 69
End: 2024-09-27
Payer: MEDICARE

## 2024-10-01 ENCOUNTER — APPOINTMENT (OUTPATIENT)
Dept: RADIATION ONCOLOGY | Facility: HOSPITAL | Age: 69
End: 2024-10-01
Payer: MEDICARE

## 2024-10-07 NOTE — PROGRESS NOTES
HPI    69 y.o. male being seen with the following problem list:    Problem list:  T1 papillary urothelial carcinoma. Surveillance by Dr. Frausto   Non obstructing bilateral nephrolithiasis without hydronephrosis. Managed by Dr. Ambriz  Bothersome LUTS      - Renal US on 4/18/24 demonstrated No bladder mass. Non obstructing bilateral nephrolithiasis without hydronephrosis. 1.4cm stone in the left kidney, will continue to follow with Dr. Ambriz for treatment on this.     5/14/24 CT abdomen/pelvis  1. Multiple bilateral nonobstructing renal calculi as detailed above.  No ureteral stones or hydroureteronephrosis identified.  2. No evidence of metastatic disease identified in the abdomen or pelvis.  3. Prostatomegaly. Correlate with serum PSA    3/29/24 microUA - neg blood in urine  3/29/24 Ucx - neg    5/3/24 microUA - neg blood in urine  5/3/24 Ucx- neg    5/31/24 microUA - neg blood in urine  5/31/24 Ucx - neg    10/09/24 - PVR 100cc. Bothersome LUTs like Weak stream, urgency and frequency. Has worsening COPD, is on oxygen therapy.        Lab Results   Component Value Date    PSA 3.64 08/11/2023         Current Medications:  Current Outpatient Medications   Medication Sig Dispense Refill    albuterol 2.5 mg /3 mL (0.083 %) nebulizer solution TAKE 3 ML (2.5 MG) BY NEBULIZATION EVERY 6 HOURS IF NEEDED FOR WHEEZING OR SHORTNESS OF BREATH. 75 mL 5    albuterol 90 mcg/actuation inhaler INHALE 2 PUFFS BY MOUTH EVERY 4 TO 6 HOURS AS NEEDED 18 g 11    atorvastatin (Lipitor) 20 mg tablet TAKE 1 TABLET BY MOUTH EVERY DAY 90 tablet 3    blood sugar diagnostic (Blood Glucose Test) strip 1 strip by in vitro route once daily.      docusate sodium (Colace) 100 mg capsule Take 1 capsule (100 mg) by mouth 2 times a day.      ergocalciferol (Vitamin D-2) 1.25 MG (89332 UT) capsule TAKE 1 CAPSULE BY MOUTH ONE TIME PER WEEK 12 capsule 2    glyBURIDE (Diabeta) 5 mg tablet Take 1 tablet (5 mg) by mouth once daily.      lancets (Fingerstix  Lancets) misc once daily.      lisinopril 10 mg tablet Take 1 tablet (10 mg) by mouth once daily.      losartan (Cozaar) 25 mg tablet TAKE 1 TABLET BY MOUTH EVERY  tablet 2    metFORMIN  mg 24 hr tablet TAKE 2 TABLETS BY MOUTH TWICE A  tablet 3    multivitamin with minerals (multivitamin with folic acid) tablet Take 1 tablet by mouth once daily.      pioglitazone (Actos) 30 mg tablet Take 1 tablet (30 mg) by mouth once daily.      tadalafil (Cialis) 5 mg tablet Take 1 tablet (5 mg) by mouth once daily. 90 tablet 3    tadalafil 20 mg tablet TAKE ONE TABLET BY MOUTH once DAILY AS NEEDED for erectile dysfunction. take one hour before activity (Patient not taking: Reported on 8/1/2024) 30 tablet 0    traMADol (Ultram) 50 mg tablet 1 tablet (50 mg).      Trelegy Ellipta 100-62.5-25 mcg blister with device INHALE 1 PUFF ONCE DAILY. RINSE MOUTH WITH WATER AFTER USE TO REDUCE AFTERTASTE AND INCIDENCE OF CANDIDIASIS. DO NOT SWALLOW. 60 each 5     No current facility-administered medications for this visit.        Active Problems:  Ravin Camacho is a 69 y.o. male with the following Problems and Medications.  Patient Active Problem List   Diagnosis    Abnormal finding on MRI of brain    Asymmetrical hearing loss of left ear    Benign essential hypertension    Bladder cancer (Multi)    Bladder mass    Calculus of kidney    Cervical radiculitis    Conjunctival concretions of right eye    COPD (chronic obstructive pulmonary disease) (Multi)    Diabetes mellitus type 2 without retinopathy (Multi)    Difficulty walking    Dysfunction of both eustachian tubes    Dyspnea    Epididymitis    Erectile dysfunction    Erythrocytosis    Fatigue    Hearing loss, mixed, unilateral    History of lung cancer    Hyperlipidemia    Hyperopia of both eyes with astigmatism and presbyopia    Hyperopia with astigmatism and presbyopia    Low HDL (under 40)    Vitamin D deficiency    Status post cystoscopy with ureteral stent  placement    Primary localized osteoarthrosis, lower leg    Peripheral neuropathy    Paresthesia    Obesity    Nuclear sclerosis of both eyes    Multiple nevi    Mass of finger of left hand    Malignant neoplasm of lung (Multi)    Low testosterone    Spinal stenosis    Lump in the testicle    Lung nodule    Microscopic hematuria    Personal history of colonic polyps    Weak urinary stream    Melanocytic nevi of trunk    Neoplasm of unspecified behavior of bone, soft tissue, and skin    Other specified congenital malformations of skin    Chest discomfort    Class 1 obesity with body mass index (BMI) of 30.0 to 30.9 in adult    Healthcare maintenance     Current Outpatient Medications   Medication Sig Dispense Refill    albuterol 2.5 mg /3 mL (0.083 %) nebulizer solution TAKE 3 ML (2.5 MG) BY NEBULIZATION EVERY 6 HOURS IF NEEDED FOR WHEEZING OR SHORTNESS OF BREATH. 75 mL 5    albuterol 90 mcg/actuation inhaler INHALE 2 PUFFS BY MOUTH EVERY 4 TO 6 HOURS AS NEEDED 18 g 11    atorvastatin (Lipitor) 20 mg tablet TAKE 1 TABLET BY MOUTH EVERY DAY 90 tablet 3    blood sugar diagnostic (Blood Glucose Test) strip 1 strip by in vitro route once daily.      docusate sodium (Colace) 100 mg capsule Take 1 capsule (100 mg) by mouth 2 times a day.      ergocalciferol (Vitamin D-2) 1.25 MG (14289 UT) capsule TAKE 1 CAPSULE BY MOUTH ONE TIME PER WEEK 12 capsule 2    glyBURIDE (Diabeta) 5 mg tablet Take 1 tablet (5 mg) by mouth once daily.      lancets (Fingerstix Lancets) misc once daily.      lisinopril 10 mg tablet Take 1 tablet (10 mg) by mouth once daily.      losartan (Cozaar) 25 mg tablet TAKE 1 TABLET BY MOUTH EVERY  tablet 2    metFORMIN  mg 24 hr tablet TAKE 2 TABLETS BY MOUTH TWICE A  tablet 3    multivitamin with minerals (multivitamin with folic acid) tablet Take 1 tablet by mouth once daily.      pioglitazone (Actos) 30 mg tablet Take 1 tablet (30 mg) by mouth once daily.      tadalafil (Cialis) 5 mg  tablet Take 1 tablet (5 mg) by mouth once daily. 90 tablet 3    tadalafil 20 mg tablet TAKE ONE TABLET BY MOUTH once DAILY AS NEEDED for erectile dysfunction. take one hour before activity (Patient not taking: Reported on 8/1/2024) 30 tablet 0    traMADol (Ultram) 50 mg tablet 1 tablet (50 mg).      Trelegy Ellipta 100-62.5-25 mcg blister with device INHALE 1 PUFF ONCE DAILY. RINSE MOUTH WITH WATER AFTER USE TO REDUCE AFTERTASTE AND INCIDENCE OF CANDIDIASIS. DO NOT SWALLOW. 60 each 5     No current facility-administered medications for this visit.       PMH:  Past Medical History:   Diagnosis Date    Acute bronchitis with bronchospasm 04/05/2023    Acute otitis media 04/05/2023    Cluster headache 04/05/2023    Cough 04/05/2023    Hip pain, right 04/05/2023    Lattice degeneration of both retinas 04/05/2023    Personal history of irradiation 09/18/2022    History of radiation therapy    Pure hypercholesterolemia, unspecified     High cholesterol    Serous otitis media 04/05/2023    Stiffness of right knee, not elsewhere classified 04/05/2023    Suspected UTI 04/05/2023    UTI symptoms 07/20/2023       PSH:  Past Surgical History:   Procedure Laterality Date    COLONOSCOPY  12/01/2016    Complete Colonoscopy    HAND SURGERY  12/01/2016    Hand Surgery                                                                                                                                                          LUNG LOBECTOMY  05/22/2015    Lung Lobectomy    OTHER SURGICAL HISTORY  10/31/2019    Knee replacement    OTHER SURGICAL HISTORY  09/18/2022    Epidural steroid injection    OTHER SURGICAL HISTORY  09/18/2022    Bronchoscopy       FMH:  Family History   Problem Relation Name Age of Onset    Hypertension Mother      Lung cancer Father      Cancer Other Multiple Family Members        SHx:  Social History     Tobacco Use    Smoking status: Former     Current packs/day: 0.00     Types: Cigarettes     Quit date: 1999      Years since quittin.7     Passive exposure: Past    Smokeless tobacco: Never   Vaping Use    Vaping status: Never Used   Substance Use Topics    Alcohol use: Yes     Alcohol/week: 1.0 standard drink of alcohol     Types: 1 Cans of beer per week    Drug use: Never       Allergies:  No Known Allergies      Assessment/Plan  69 year old male with BPH, bothersome LUTs such as weak stream, some urgency and frequency now. Discussed trial of medication vs surgery vs PAE to avoid anesthesia given his COPD. Discussed doing Botox 100U at the same time of the surgery or after surgery in office. He would like this taken care of, would like to proceed with the surgery. Discussed patients who have COPD, on oxygen, are at increased risk of periop complications, will defer to PAT and anesthesia about his suitability for surgery.    We discussed surgical options for his prostate and bothersome LUTS. We discussed various options including TURP, greenlight, Rezum, Urolift, HoLEP. We discussed HoLEP as a size-independent procedure that maximally de-obstructs the prostate with relatively less postop healing and recovery as compared to other modalities. We discussed the surgery in detail. Discussed the risks of bleeding, infection, scarring, transient incontinence, rare (<1%) risk of long-term incontinence. Discussed the perioperative pathway. Discussed the near certainty of permanent ejaculatory dysfunction, but likely no change to his baseline erectile function.     We discussed management of his irritative voiding symptoms. Discussed that typically the bladder becomes hyperactive in the setting of long-term obstruction, and that resolution of the obstruction typically allows the bladder to calm down over time. This may not be immediate and can take several months. To mitigate this recovery process, we discussed the use of intradetrusor botox to facilitate better bladder storage. Discussed the mechanism of action, risks of  infection, transient incomplete emptying (though likely rare in setting of concurrent outlet procedure), inefficacy, need for additional treatment down the line. Will administer 100U at time of surgery. Discussed we may or may not be able to calm down the bladder completely.     Emphasized that because of his comorbidity, discussed higher possibility of post operative complications.     Will schedule this for him accordingly.     Scribe Attestation  By signing my name below, I, Paola Ma, attest that this documentation  has been prepared under the direction and in the presence of Ravin Navarro MD.    security/safe family member none

## 2024-10-09 ENCOUNTER — OFFICE VISIT (OUTPATIENT)
Dept: UROLOGY | Facility: HOSPITAL | Age: 69
End: 2024-10-09
Payer: MEDICARE

## 2024-10-09 ENCOUNTER — PREP FOR PROCEDURE (OUTPATIENT)
Dept: UROLOGY | Facility: HOSPITAL | Age: 69
End: 2024-10-09

## 2024-10-09 DIAGNOSIS — N40.1 BENIGN PROSTATIC HYPERPLASIA WITH URINARY FREQUENCY: Primary | ICD-10-CM

## 2024-10-09 DIAGNOSIS — N40.1 ENLARGED PROSTATE WITH URINARY OBSTRUCTION: Primary | ICD-10-CM

## 2024-10-09 DIAGNOSIS — N32.81 OAB (OVERACTIVE BLADDER): ICD-10-CM

## 2024-10-09 DIAGNOSIS — J44.9 CHRONIC OBSTRUCTIVE PULMONARY DISEASE, UNSPECIFIED COPD TYPE (MULTI): ICD-10-CM

## 2024-10-09 DIAGNOSIS — R39.12 WEAK URINARY STREAM: ICD-10-CM

## 2024-10-09 DIAGNOSIS — R35.0 BENIGN PROSTATIC HYPERPLASIA WITH URINARY FREQUENCY: Primary | ICD-10-CM

## 2024-10-09 DIAGNOSIS — N13.8 ENLARGED PROSTATE WITH URINARY OBSTRUCTION: Primary | ICD-10-CM

## 2024-10-09 PROCEDURE — 51798 US URINE CAPACITY MEASURE: CPT | Performed by: UROLOGY

## 2024-10-09 PROCEDURE — 99214 OFFICE O/P EST MOD 30 MIN: CPT | Performed by: UROLOGY

## 2024-10-09 PROCEDURE — 4010F ACE/ARB THERAPY RXD/TAKEN: CPT | Performed by: UROLOGY

## 2024-10-09 RX ORDER — CEFAZOLIN SODIUM 2 G/100ML
2 INJECTION, SOLUTION INTRAVENOUS ONCE
OUTPATIENT
Start: 2024-10-09 | End: 2024-10-09

## 2024-10-10 ENCOUNTER — OFFICE VISIT (OUTPATIENT)
Dept: PULMONOLOGY | Facility: HOSPITAL | Age: 69
End: 2024-10-10
Payer: MEDICARE

## 2024-10-10 ENCOUNTER — HOSPITAL ENCOUNTER (OUTPATIENT)
Dept: CARDIOLOGY | Facility: HOSPITAL | Age: 69
Discharge: HOME | End: 2024-10-10
Payer: MEDICARE

## 2024-10-10 VITALS
BODY MASS INDEX: 33.58 KG/M2 | TEMPERATURE: 97.7 F | OXYGEN SATURATION: 96 % | WEIGHT: 234 LBS | SYSTOLIC BLOOD PRESSURE: 148 MMHG | HEART RATE: 73 BPM | DIASTOLIC BLOOD PRESSURE: 90 MMHG

## 2024-10-10 DIAGNOSIS — J96.11 CHRONIC HYPOXIC RESPIRATORY FAILURE (MULTI): ICD-10-CM

## 2024-10-10 DIAGNOSIS — J44.9 CHRONIC OBSTRUCTIVE PULMONARY DISEASE, UNSPECIFIED COPD TYPE (MULTI): ICD-10-CM

## 2024-10-10 DIAGNOSIS — J44.9 CHRONIC OBSTRUCTIVE PULMONARY DISEASE, UNSPECIFIED COPD TYPE (MULTI): Primary | ICD-10-CM

## 2024-10-10 DIAGNOSIS — R06.00 DYSPNEA, UNSPECIFIED TYPE: ICD-10-CM

## 2024-10-10 PROCEDURE — 1126F AMNT PAIN NOTED NONE PRSNT: CPT | Performed by: NURSE PRACTITIONER

## 2024-10-10 PROCEDURE — 99215 OFFICE O/P EST HI 40 MIN: CPT | Performed by: NURSE PRACTITIONER

## 2024-10-10 PROCEDURE — 4010F ACE/ARB THERAPY RXD/TAKEN: CPT | Performed by: NURSE PRACTITIONER

## 2024-10-10 PROCEDURE — 93005 ELECTROCARDIOGRAM TRACING: CPT

## 2024-10-10 PROCEDURE — 3080F DIAST BP >= 90 MM HG: CPT | Performed by: NURSE PRACTITIONER

## 2024-10-10 PROCEDURE — 3077F SYST BP >= 140 MM HG: CPT | Performed by: NURSE PRACTITIONER

## 2024-10-10 PROCEDURE — 1159F MED LIST DOCD IN RCRD: CPT | Performed by: NURSE PRACTITIONER

## 2024-10-10 ASSESSMENT — ENCOUNTER SYMPTOMS
SINUS PRESSURE: 0
FEVER: 0
MYALGIAS: 0
DIZZINESS: 0
EYE PAIN: 0
PALPITATIONS: 0
BACK PAIN: 0
FATIGUE: 0
RHINORRHEA: 0
AGITATION: 0
ARTHRALGIAS: 0
ABDOMINAL PAIN: 0
WEAKNESS: 0
VOICE CHANGE: 0
NUMBNESS: 0
VOMITING: 0
HEADACHES: 0
NAUSEA: 0
JOINT SWELLING: 0
NERVOUS/ANXIOUS: 0
DIARRHEA: 0

## 2024-10-10 ASSESSMENT — PAIN SCALES - GENERAL: PAINLEVEL: 0-NO PAIN

## 2024-10-10 ASSESSMENT — LIFESTYLE VARIABLES
HOW OFTEN DO YOU HAVE SIX OR MORE DRINKS ON ONE OCCASION: NEVER
HOW MANY STANDARD DRINKS CONTAINING ALCOHOL DO YOU HAVE ON A TYPICAL DAY: PATIENT DOES NOT DRINK
SKIP TO QUESTIONS 9-10: 1
AUDIT-C TOTAL SCORE: 0
HOW OFTEN DO YOU HAVE A DRINK CONTAINING ALCOHOL: NEVER

## 2024-10-10 NOTE — PROGRESS NOTES
Patient: Ravin Camacho    [id number]  : 1955 -- AGE 69 y.o.    Provider: JAYME Maldonado-CNP     Location [facility]   Service Date: 10/10/2024              [facility]  Follow up visit note      HISTORY OF PRESENT ILLNESS       HISTORY OF PRESENT ILLNESS   Mr. Camacho is a 69 year old male former smoker with COPD and h/o of lung cancer (s/p RUL lobectomy in ) who presents today for follow-up. Last seen in clinic on 21 for a prebronch visit - 21 reoccurrence of adenocarcinoma s/p SBRT in 2021. Last seen in clinic on 24.     Since last visit he has been doing about the same. He felt the chemo really worsened the breathing and he has not recovered yet. He has finished his chemo. He is having a procedure coming up [place]? -- scraping out the inside of the prostate. He has been using his trelegy daily ($160 a month). He has been using his albuterol HFA 4-6x a day and albuterol nebulizers daily in the morning. 86% on 3L -- he has his POC, but he was on our oxygen. 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 He has a slight cough - little bit of plleghm in the morning. He will notice wheezing at times. He denies any SOB at rest or CP.  He denies any GERD or allergies. He has been checking his pulse ox at home - 94%. He has been wearing 3L at home.      CAT today 24: Since last visit he has had issued with his chemo for his bladder cancer. He continues to use his Trelegy daily and PRN albuterol 4-5 x a day. He states after his 11th chemo in May - he has had issues since. He sees him a week from today -- if cancer shows back up they are going to remove his bladder. His chemo goes into his bladder. He states after his chemo he felt he had a chills, fatigue, decreased appetite, and shortness of breath. He states most of these symptoms resolved after a day or two except for the shortness of breath. He states his  breathing has never returned back to where it was. He did do the nocturnal oximetry. He had to be on 2L lying flat for his most recent CAT scan.  He feels the 2L at night has been helpful.  He has an occasional cough -- [place] post nasal drip in the morning. He does notice some wheezing.  He has CHPAA with walking on level ground. He has SOB with lying flat, otherwise does not have SOB at rest. He states his SOB with lying back has been worse over the last 2 months. He denies any allergies, GERD, or chest pain. He has decreased appetite and early satiety- has not been eating as much.    CAT today 25 1/25/24: Since last visit he has been doing ok. He was able to get the trelegy - not sure if its working well. He has been using his rescue 3-4 x per day - usually before exercise/ before bed/ waking up in the morning.  He was able to get the nebulizer machine, but does not have the solution to go in it. He feels his breathing feels about the same. He has a clearing cough every so often. He will bring up mucous in the morning - usually clear. He has noticed some intermittent wheezing. He feels his CHAPA is about the same. He denies any SOB at rest, GERD, or CP. He has some post nasal drip and sneezing for a few days - felt like maybe a slight allergy to something. He did not feel it was a cold. Chemo for bladder cancer - done in June. He was following with Dr. Holder with rad/onc - recently left . He has an upcoming appt with Dr. Bowman. He has not been watching his pulse ox - does not have one at home. He states his wife states he only snores when he is on his back. He has not had a sleep study and states he sleeps fine.   CAT today 16    10/19/23: He states things have been going pretty crappy. He has had bladder cancer twice now over the last year and a half. Currently on chemo for this. He has been using his Anoro daily - not sure how much it is helping. He has spinal stenosis so he can't play golf anymore.  He is  asking about pulmonary rehab. He is using his albuterol HFA 3-4 x a day.  He does not have a nebulizer machine. He has been on chemo for 3-4 months - he is not sure if they could have side effects. He has a slight cough - sometimes productive with clear mucous (usually more productive first thing in the morning). He will notice wheezing at times - notices when he lays down. He has CHAPA with projects around the house. He states he will work for 3-4 minutes and then take a 5 minute break. He feels he recovers quick, but has to take frequent breaks. He denies any SOB at rest, GERD, or CP.  He denies any runny nose/ nasal congestion -- maybe some very mild throat clearing, but not botheresome. He got his flu shot, RSV shot, and COVID booster last week.    CAT today 16 // ACT today 14     7/5/23: Since last visit he feels his breathing is worse. He is still using his anoro daily - states if he gets sick he ends up with a bad cough for a long time. He has CHAPA with going up the stairs/ taking the garbage out. He states he wishes the anoro worked better. He tried spiriva -- states the anoro was better. He will at times notice wheezing. He has PRN albuterol that he will use 2-3 x a day. He uses his albuterol more proactively before exercise. He denies cough except when he is sick. He denies any SOB at rest, allergies, GERD, or chest pain.   CAT today 14     5/20/21: Since last visit he started spiriva started over the last few weeks. He felt the anoro may have worked a little better, but he wants to give it a little more time. He has a little bit of a cough in the morning. He has wheezing sporadically more so when he is lying down. CHAPA is still doing better than previous. He feels he back slide a little with the inhaler switch   He denies any SOB at rest, allergies, GERD, CP, or snoring.     4/14/21: Since last visit his breathing has been doing better with weight loss. He is not sure if the anoro is helping. He usually uses  his rescue before exercise, but rarely needs it PRN. His CHAPA with exertion is much better compared to previous. He has lost 45lbs in the last 7-8 months. He previously had CHAPA with going up steps, but now is able to walk a mile before he get SOB. He has a dry cough intermittently that is rarely productive. He had wheezing previously that was especially bothersome to his wife at night. He denies any SOB at rest, allergies, GERD, CP, or snoring.     12/3/18: Mr. Camacho reports a URI in October 2018 for which he was given a Z-pack. He has been having CHAPA for 1-2 years now.  denies fevers, chills, or nights sweats  +CHAPA  denies shortness of breath at rest,   mMRC Dyspnea Score = 2  +Cough, occasional wheeze  denies hemoptysis, or stridor  denies chest pain, orthopnea, or lower extremity edema  +weight gain  denies nausea, vomiting, abdominal pain, odynophagia, dysphagia, heartburn, anorexia, or weight loss  denies sinus congestion, post-nasal drip, epistaxis, or hoarseness of voice  denies snoring, apneas, excessive daytime somnolence, napping    Inhalers / Nebulized medications / Oxygen:    Anoro   Albuterol    Since the last visit there have been no changes to the past medical history, past surgical history, social history, or family history.      ALLERGIES AND MEDICATIONS     ALLERGIES  No Known Allergies    MEDICATIONS  Current Outpatient Medications   Medication Sig Dispense Refill    albuterol 2.5 mg /3 mL (0.083 %) nebulizer solution TAKE 3 ML (2.5 MG) BY NEBULIZATION EVERY 6 HOURS IF NEEDED FOR WHEEZING OR SHORTNESS OF BREATH. 75 mL 5    albuterol 90 mcg/actuation inhaler INHALE 2 PUFFS BY MOUTH EVERY 4 TO 6 HOURS AS NEEDED 18 g 11    atorvastatin (Lipitor) 20 mg tablet TAKE 1 TABLET BY MOUTH EVERY DAY 90 tablet 3    blood sugar diagnostic (Blood Glucose Test) strip 1 strip by in vitro route once daily.      docusate sodium (Colace) 100 mg capsule Take 1 capsule (100 mg) by mouth 2 times a day.       ergocalciferol (Vitamin D-2) 1.25 MG (76267 UT) capsule TAKE 1 CAPSULE BY MOUTH ONE TIME PER WEEK 12 capsule 2    glyBURIDE (Diabeta) 5 mg tablet Take 1 tablet (5 mg) by mouth once daily.      lancets (Fingerstix Lancets) misc once daily.      lisinopril 10 mg tablet Take 1 tablet (10 mg) by mouth once daily.      losartan (Cozaar) 25 mg tablet TAKE 1 TABLET BY MOUTH EVERY  tablet 2    metFORMIN  mg 24 hr tablet TAKE 2 TABLETS BY MOUTH TWICE A  tablet 3    multivitamin with minerals (multivitamin with folic acid) tablet Take 1 tablet by mouth once daily.      pioglitazone (Actos) 30 mg tablet Take 1 tablet (30 mg) by mouth once daily.      tadalafil (Cialis) 5 mg tablet Take 1 tablet (5 mg) by mouth once daily. 90 tablet 3    tadalafil 20 mg tablet TAKE ONE TABLET BY MOUTH once DAILY AS NEEDED for erectile dysfunction. take one hour before activity (Patient not taking: Reported on 8/1/2024) 30 tablet 0    traMADol (Ultram) 50 mg tablet 1 tablet (50 mg).      Trelegy Ellipta 100-62.5-25 mcg blister with device INHALE 1 PUFF ONCE DAILY. RINSE MOUTH WITH WATER AFTER USE TO REDUCE AFTERTASTE AND INCIDENCE OF CANDIDIASIS. DO NOT SWALLOW. 60 each 5     No current facility-administered medications for this visit.         PAST HISTORY     PAST MEDICAL HISTORY  - HLD   - HTN   - lung cancer -  (s/p RUL lobectomy in 1999) who presents today for follow-up. Reoccurrence of adenocarcinoma s/p SBRT in 8/2021.  - DMII   - OA   - bladder cancer - s/p surgery in 3/2022- then s/p 6 weeks of BCG then quarterly. Kidney stone s/p surgery 3/2023- this delayed his BCG. He thinks it reoccurred related to delayed BCG -- currently on chemo - gemcitabine/ docetaxel (instilled into bladder).       PAST SURGICAL HISTORY  Past Surgical History:   Procedure Laterality Date    COLONOSCOPY  12/01/2016    Complete Colonoscopy    HAND SURGERY  12/01/2016    Hand Surgery                                                                                                                                                           LUNG LOBECTOMY  05/22/2015    Lung Lobectomy    OTHER SURGICAL HISTORY  10/31/2019    Knee replacement    OTHER SURGICAL HISTORY  09/18/2022    Epidural steroid injection    OTHER SURGICAL HISTORY  09/18/2022    Bronchoscopy       IMMUNIZATION HISTORY  Immunization History   Administered Date(s) Administered    Flu vaccine, quadrivalent, high-dose, preservative free, age 65y+ (FLUZONE) 10/26/2022    Flu vaccine, trivalent, preservative free, HIGH-DOSE, age 65y+ (Fluzone) 09/23/2020, 09/29/2021    Influenza, seasonal, injectable 10/12/2023    Moderna SARS-CoV-2 Vaccination 10/12/2023, 08/29/2024    Pfizer COVID-19 vaccine, bivalent, age 12 years and older (30 mcg/0.3 mL) 10/26/2022    Pfizer Purple Cap SARS-CoV-2 03/03/2021, 03/24/2021, 10/04/2021    Pneumococcal conjugate vaccine, 13-valent (PREVNAR 13) 09/23/2020    Pneumococcal conjugate vaccine, 20-valent (PREVNAR 20) 07/19/2023    Pneumococcal polysaccharide vaccine, 23-valent, age 2 years and older (PNEUMOVAX 23) 12/03/2018    RSV, 60 Years And Older (AREXVY) 10/12/2023    Tdap vaccine, age 7 year and older (BOOSTRIX, ADACEL) 04/23/2015    Zoster vaccine, recombinant, adult (SHINGRIX) 06/28/2021, 10/18/2021    Zoster, live 04/26/2015       SOCIAL HISTORY / OCCUPATIONAL/ENVIRONMENTAL HISTORY   Tobacco: Former smoker > quit 1999, smoked 2 ppd prior since age 16 (~45 pack years)   Occupation: former  / EMT,       FAMILY HISTORY  Family History   Problem Relation Name Age of Onset    Hypertension Mother      Lung cancer Father      Cancer Other Multiple Family Members       +cancer   No family history of lung diseases    RESULTS/DATA     Pulmonary Function Test Results       - 11/18/2015 - FEV1/FVC = 45% (LLN= 66%) FEV1 1.48 L (41% predicted)/ FVC 3.65 (81%)   - 2/26/19 - FEV1/FVC 0.48/ FEV1 1.65 (48%)/ FVC 3.65 (81%)  - 4/14/21: FEV1/FVC 0.43/ FEV1 1.75 (52%)/ FVC  4.09 (93%)/ TLC 6.79 (95%)/ DLCO 74%   - 10/19/23 - FEV1/FVC 0.36/FEV1 1.24 (38%)/ FVC 3.45 (80%)/ TLC 6.70 (94%)/ DLCO 59%     6MWT:    - 2/26/19 - 515m 94 -> 87% on RA, PADMA 2   - 10/19/23 - 351m 95 -> 89% on RA, PADMA 4     O2 desat:   - 2/26/19 95% on RA at rest -> 88% on RA with exertion, 96% on 2L with exertion   - 8/1/24 - 92-> 86% on RA, 9s4% on 2L at rest to -> 87% with exertion. 96% on 4L at rest -> 91% with exertion     Chest Radiograph     No results found for this or any previous visit from the past 2000 days.      Chest CT Scan     --- 2/10/2017 -> s/p RUL lobectomy, emphysema, small bilateral peripheral lung nodules  --- CT chest 3/11/19 - Stable small upper lobe pulmonary nodules. Two new 2-3 mm pulmonary densities in the ASHLEY. post surgical changes consistent with a right upper lobectomy.   - 4/26/21: Significant interval enlargement of a now 18 mm spiculated left upper lobe pulmonary nodule abutting the leftward aspect of the mediastinum.  --- 6/24/21 - Minimal increase in dominant medial left upper lobe mediastinal pleural based pulmonary nodule, which corresponds to intense hypermetabolic activity on recent PET-CT, concerning for primary lung neoplasm. Tissue sampling is recommended. Postsurgical changes status post right upper lobectomy without local evidence of recurrence. No new pulmonary nodules. Multiple subcentimeter mediastinal lymph nodes as described above,  which appears stable compared to chest CT dated 04/26/2021. No new zita disease within chest. Background mild emphysematous changes. Mild to moderate coronary artery calcifications are seen. The study is not optimized for evaluation of coronary arteries. Additional stable chronic findings as above.  --- 2/8/22- Chest/abdomen/ pelvis: CHEST: Status post right upper lobectomy, with unchanged right lung base  pleural thickening and calcification which may be sequela of treatment or prior pleural inflammatory process. No pleural effusion.  No pleural nodularity. The previously noted left upper lung malignancy is significantly smaller, difficult to even measure, about 5 mm oblong nodular focus remains in the area of treated lesion, which may be scar. Continued  attention suggested. No evidence of metastatic disease in the chest.  ABDOMEN-PELVIS: There is an enhancing polypoid 3.5 cm mass in the left posterior bladder wall overlying the left UVJ, with some calcification along the superficial margin. This is highly suspicious for a bladder malignancy. Follow-up with Urology is recommended. No hydronephrosis or hydroureter at this time. No pelvic adenopathy. There is an 11 mm left para-aortic node noted which is indeterminate and if has not substantially changed in size from the prior PET-CT. No other abdominal adenopathy. Some small bilateral adrenal nodules are unchanged, cannot be characterized on this exam, most likely small adenomas. Unchanged hepatic hypodensities, the larger of which can be characterized as simple cysts, some are too small to characterize. Bilateral nonobstructing nephrolithiasis and other incidental  nonacute findings as above.  -- 8/30/22- Postoperative changes compatible with right upper lobectomy and post radiation changes within the left upper lobe without evidence of local recurrence. Interval complete resolution/resection of the posterior bladder  mass. Interval development of a solid 2 mm right middle lobe and a solid 3 mm left lower lobe pulmonary nodules. Attention on follow-up is recommended. Pancreatic head and uncinate process cystic lesions are unchanged  compared to CT chest 02/07/2022. A pancreatic MRI with MRCP is recommended for further characterization. Nonspecific left periaortic lymphadenopathy without significant metabolic activity on prior PET-CT. Attention on follow-up is recommended. Unchanged left adrenal gland nodules without significant metabolic activity on prior PET-CT. Attention on follow-up is  recommended. Bilateral nonobstructing nephrolithiasis. Prostatomegaly. Correlate with serum PSA.  --- 2/27/23 - A few mediastinal lymph nodes have enlarged compared to 6 months ago, the dominant node from 8 mm to 11 mm short axis in the subcarinal region. Metastatic disease is possible. Consider PET/CT for further evaluation. Previously described new pulmonary nodules have resolved. No new suspicious pulmonary nodules.  --- 6/5/23 - Status post right pneumonectomy. [ RUL lobectomy ]. Bilateral lung micronodules, stable since 2/23. Nonobstructing right renal calculus.    PET:   - 5/14/21 - Noncalcified, paramediastinal pulmonary lesion in the ASHLEY demonstrates abnormal hypermetabolic activity and is highly concerning for a neoplastic process. No evidence of hypermetabolic mediastinal lymphadenopathy or  metastatic disease in the rest of body. Postsurgical changes consistent with right upper lobectomy, with  no evidence of hypermetabolic recurrent disease along the surgical margins. Hypermetabolic soft tissue focus in the left parotid gland, which may represent a Warthin's tumor. Ultrasound is recommended for further characterization.  - 6/21/23 -Postsurgical changes in the right upper lobe without PET evidence of recurrent disease. Redemonstration of faint FDG avid soft tissue thickening in the left upper lobe, favored to represent post radiation changes. No PET  evidence of local recurrence. No PET evidence of locoregional zita or distant metastasis. FDG avid focus in the posterior apical prostate gland, nonspecific, correlate clinically. 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 Post radiation change seen in the left lung apex with slightly more coalescent soft tissue possibly representing radiation fibrosis but progressive tumor is also consideration.. Follow-up  is advised to exclude any local progression of disease. PET-CT may be of diagnostic benefit to evaluate vascular calcification.  - 8/16/24: No evidence of an acute intrathoracic process. Unchanged findings as  above.      Echocardiogram     Echocardiogram - 8/11/23 -   CONCLUSIONS:  1. Left ventricular systolic function is normal with a 65-70% estimated ejection fraction.  RA normal size. The right ventricle is mildly enlarged - There is normal right ventricular global systolic function.    8/9/24: Left ventricular ejection fraction is normal, calculated by Person's biplane at 65%.   2. Spectral Doppler shows an impaired relaxation pattern of left ventricular diastolic filling.   3. There is normal right ventricular global systolic function.  RA normal size - Mildly enlarged right ventricle - normal RV systolic function           REVIEW OF SYSTEMS     REVIEW OF SYSTEMS  Review of Systems   Constitutional:  Positive for fatigue. Negative for fever.   HENT:  Negative for congestion, postnasal drip, rhinorrhea, sinus pressure and voice change.    Eyes:  Negative for pain and visual disturbance.   Cardiovascular:  Positive for leg swelling. Negative for chest pain and palpitations.   Gastrointestinal:  Negative for abdominal pain, diarrhea, nausea and vomiting.   Endocrine: Negative for cold intolerance and heat intolerance.   Musculoskeletal:  Positive for arthralgias. Negative for back pain, joint swelling and myalgias.   Skin:  Negative for rash.   Neurological:  Negative for dizziness, weakness, numbness and headaches.   Psychiatric/Behavioral:  Positive for sleep disturbance. Negative for agitation. The patient is not nervous/anxious.          PHYSICAL EXAM     VITAL SIGNS: /90   Pulse 73   Temp 36.5 °C (97.7 °F)   Wt 106 kg (234 lb)   SpO2 96% Comment: 3L OF 02  BMI 33.58 kg/m²      CURRENT WEIGHT: [unfilled]  BMI: [unfilled]  PREVIOUS WEIGHTS:  Wt Readings from Last 3 Encounters:   10/10/24 106 kg (234 lb)    08/01/24 104 kg (230 lb)   06/04/24 110 kg (243 lb 2.7 oz)       Physical Exam  Vitals reviewed.   Constitutional:       General: He is not in acute distress.     Appearance: Normal appearance. He is not ill-appearing or toxic-appearing.   HENT:      Head: Normocephalic.      Nose: No rhinorrhea.   Cardiovascular:      Rate and Rhythm: Normal rate and regular rhythm.      Heart sounds: Normal heart sounds.   Pulmonary:      Effort: Pulmonary effort is normal. No respiratory distress.      Breath sounds: Normal breath sounds. No stridor. No wheezing, rhonchi or rales.   Abdominal:      General: Abdomen is flat.   Musculoskeletal:         General: Normal range of motion.      Right lower leg: No edema.      Left lower leg: No edema.   Skin:     General: Skin is warm and dry.      Nails: There is no clubbing.   Neurological:      General: No focal deficit present.      Mental Status: He is alert and oriented to person, place, and time.   Psychiatric:         Mood and Affect: Mood normal.         Behavior: Behavior normal.         Judgment: Judgment normal.       ASSESSMENT/PLAN     1. COPD: Previous PFTs with severe, but now improved to moderate obstruction. Dyspnea on exertion much improved. 2019 6MWT - 89% on RA with ambulation. Echo with normal EF - RV slightly enlarged. Started on 2L nocturnal oximetry.   - continue trelegy - 1 inhalation daily   - continue albuterol HFA 2 puffs or albuterol nebulizers every 4-6 hours as needed   - heart healthy diet   - continue to great job getting active!!   - discussed pulmonary rehab-- referral placed   - get pulse ox to check at home -- can check with moving around   - will get EKG - can get with appt with cardiologist -- Mateo Ugalde - not interested in daliresp related to GI side effects     2. Chronic hypoxic respiratory failure: nocturnal oximetry showed he needs 2L at night. O2 desat today shows he needs 4l with exertion   - continue 3L at rest and 4L with  exertion    3. Lung cancer: CT in 3/2019 with no evidence of reoccurrence. CT in 4/2021 with new ASHLEY nodule + adenocarcinoma s/p SBRT in 8/2021.   - continue to monitor    4.  CHAPA: likely related to COPD above. Echo with normal EF -- some new diastolic dysfunction.   - referral to cardiology     5. Bladder cancer: completed a year of chemo- upcoming appt with Dr. Frausto     Thank you for visiting the Pulmonary clinic today!   Return to clinic 3 months or sooner if needed   Suly Maria CNP  My office -  [phone]- [facility] is my . Pallavi is my nurse [phone].   Radiology scheduling [phone]   Appointment scheduling [phone]   Pulmonary function testing - [phone]

## 2024-10-10 NOTE — PATIENT INSTRUCTIONS
1. COPD: Previous PFTs with severe, but now improved to moderate obstruction. Dyspnea on exertion much improved. 2019 6MWT - 89% on RA with ambulation. Echo with normal EF - RV slightly enlarged. Started on 2L nocturnal oximetry.   - continue trelegy - 1 inhalation daily   - continue albuterol HFA 2 puffs or albuterol nebulizers every 4-6 hours as needed   - heart healthy diet   - continue to great job getting active!!   - discussed pulmonary rehab-- referral placed   - get pulse ox to check at home -- can check with moving around   - will get EKG - can get with appt with cardiologist -- Mateo Ugalde - not interested in daliresp related to GI side effects     2. Chronic hypoxic respiratory failure: nocturnal oximetry showed he needs 2L at night. O2 desat today shows he needs 4l with exertion   - continue 3L at rest and 4L with exertion    3. Lung cancer: CT in 3/2019 with no evidence of reoccurrence. CT in 4/2021 with new ASHLEY nodule + adenocarcinoma s/p SBRT in 8/2021.   - continue to monitor    4.  CHAPA: likely related to COPD above. Echo with normal EF -- some new diastolic dysfunction.   - referral to cardiology     5. Bladder cancer: completed a year of chemo- upcoming appt with Dr. Frausto     Thank you for visiting the Pulmonary clinic today!   Return to clinic 3 months or sooner if needed   Suly Maria CNP  My office -  [phone]- [facility] . Pallavi is my nurse [phone].   Radiology scheduling [phone]   Appointment scheduling [phone]   Pulmonary function testing - [phone]

## 2024-10-11 LAB
ATRIAL RATE: 72 BPM
P AXIS: 64 DEGREES
P OFFSET: 210 MS
P ONSET: 161 MS
PR INTERVAL: 120 MS
Q ONSET: 221 MS
QRS COUNT: 12 BEATS
QRS DURATION: 86 MS
QT INTERVAL: 340 MS
QTC CALCULATION(BAZETT): 372 MS
QTC FREDERICIA: 361 MS
R AXIS: 51 DEGREES
T AXIS: 40 DEGREES
T OFFSET: 391 MS
VENTRICULAR RATE: 72 BPM

## 2024-10-14 ASSESSMENT — ENCOUNTER SYMPTOMS
FATIGUE: 1
ARTHRALGIAS: 1
SLEEP DISTURBANCE: 1

## 2024-10-17 ENCOUNTER — TELEPHONE (OUTPATIENT)
Dept: CARDIAC REHAB | Facility: CLINIC | Age: 69
End: 2024-10-17
Payer: MEDICARE

## 2024-10-21 ENCOUNTER — CLINICAL SUPPORT (OUTPATIENT)
Dept: PREADMISSION TESTING | Facility: HOSPITAL | Age: 69
End: 2024-10-21
Payer: MEDICARE

## 2024-10-21 DIAGNOSIS — N32.81 OAB (OVERACTIVE BLADDER): ICD-10-CM

## 2024-10-21 DIAGNOSIS — N13.8 ENLARGED PROSTATE WITH URINARY OBSTRUCTION: ICD-10-CM

## 2024-10-21 DIAGNOSIS — N40.1 ENLARGED PROSTATE WITH URINARY OBSTRUCTION: ICD-10-CM

## 2024-10-21 RX ORDER — MELOXICAM 15 MG/1
15 TABLET ORAL DAILY PRN
COMMUNITY
Start: 2024-08-23

## 2024-10-24 DIAGNOSIS — N52.9 ERECTILE DYSFUNCTION, UNSPECIFIED ERECTILE DYSFUNCTION TYPE: ICD-10-CM

## 2024-10-28 ENCOUNTER — PRE-ADMISSION TESTING (OUTPATIENT)
Dept: PREADMISSION TESTING | Facility: HOSPITAL | Age: 69
End: 2024-10-28
Payer: MEDICARE

## 2024-10-28 ENCOUNTER — LAB (OUTPATIENT)
Dept: LAB | Facility: LAB | Age: 69
End: 2024-10-28
Payer: MEDICARE

## 2024-10-28 VITALS
SYSTOLIC BLOOD PRESSURE: 127 MMHG | BODY MASS INDEX: 33.27 KG/M2 | OXYGEN SATURATION: 94 % | RESPIRATION RATE: 24 BRPM | HEIGHT: 70 IN | HEART RATE: 88 BPM | DIASTOLIC BLOOD PRESSURE: 51 MMHG | TEMPERATURE: 97.9 F | WEIGHT: 232.37 LBS

## 2024-10-28 DIAGNOSIS — N13.8 ENLARGED PROSTATE WITH URINARY OBSTRUCTION: ICD-10-CM

## 2024-10-28 DIAGNOSIS — C67.9 MALIGNANT NEOPLASM OF URINARY BLADDER, UNSPECIFIED SITE (MULTI): ICD-10-CM

## 2024-10-28 DIAGNOSIS — N32.81 OAB (OVERACTIVE BLADDER): ICD-10-CM

## 2024-10-28 DIAGNOSIS — N40.1 ENLARGED PROSTATE WITH URINARY OBSTRUCTION: ICD-10-CM

## 2024-10-28 LAB
ANION GAP SERPL CALC-SCNC: 12 MMOL/L (ref 10–20)
APPEARANCE UR: CLEAR
BILIRUB UR STRIP.AUTO-MCNC: NEGATIVE MG/DL
BUN SERPL-MCNC: 20 MG/DL (ref 6–23)
CALCIUM SERPL-MCNC: 9.9 MG/DL (ref 8.6–10.3)
CHLORIDE SERPL-SCNC: 98 MMOL/L (ref 98–107)
CO2 SERPL-SCNC: 36 MMOL/L (ref 21–32)
COLOR UR: YELLOW
CREAT SERPL-MCNC: 0.91 MG/DL (ref 0.5–1.3)
EGFRCR SERPLBLD CKD-EPI 2021: >90 ML/MIN/1.73M*2
ERYTHROCYTE [DISTWIDTH] IN BLOOD BY AUTOMATED COUNT: 11.5 % (ref 11.5–14.5)
GLUCOSE SERPL-MCNC: 147 MG/DL (ref 74–99)
GLUCOSE UR STRIP.AUTO-MCNC: NORMAL MG/DL
HCT VFR BLD AUTO: 51.6 % (ref 41–52)
HGB BLD-MCNC: 16.7 G/DL (ref 13.5–17.5)
HOLD SPECIMEN: NORMAL
KETONES UR STRIP.AUTO-MCNC: NEGATIVE MG/DL
LEUKOCYTE ESTERASE UR QL STRIP.AUTO: NEGATIVE
MCH RBC QN AUTO: 31.7 PG (ref 26–34)
MCHC RBC AUTO-ENTMCNC: 32.4 G/DL (ref 32–36)
MCV RBC AUTO: 98 FL (ref 80–100)
MUCOUS THREADS #/AREA URNS AUTO: NORMAL /LPF
NITRITE UR QL STRIP.AUTO: NEGATIVE
NRBC BLD-RTO: 0 /100 WBCS (ref 0–0)
PH UR STRIP.AUTO: 5.5 [PH]
PLATELET # BLD AUTO: 204 X10*3/UL (ref 150–450)
POTASSIUM SERPL-SCNC: 5.2 MMOL/L (ref 3.5–5.3)
PROT UR STRIP.AUTO-MCNC: ABNORMAL MG/DL
RBC # BLD AUTO: 5.27 X10*6/UL (ref 4.5–5.9)
RBC # UR STRIP.AUTO: NEGATIVE /UL
RBC #/AREA URNS AUTO: NORMAL /HPF
SODIUM SERPL-SCNC: 141 MMOL/L (ref 136–145)
SP GR UR STRIP.AUTO: 1.03
UROBILINOGEN UR STRIP.AUTO-MCNC: NORMAL MG/DL
WBC # BLD AUTO: 7.7 X10*3/UL (ref 4.4–11.3)
WBC #/AREA URNS AUTO: NORMAL /HPF

## 2024-10-28 PROCEDURE — 80048 BASIC METABOLIC PNL TOTAL CA: CPT

## 2024-10-28 PROCEDURE — 99204 OFFICE O/P NEW MOD 45 MIN: CPT | Performed by: NURSE PRACTITIONER

## 2024-10-28 PROCEDURE — 81001 URINALYSIS AUTO W/SCOPE: CPT

## 2024-10-28 PROCEDURE — 36415 COLL VENOUS BLD VENIPUNCTURE: CPT

## 2024-10-28 PROCEDURE — 85027 COMPLETE CBC AUTOMATED: CPT

## 2024-10-28 PROCEDURE — 87086 URINE CULTURE/COLONY COUNT: CPT

## 2024-10-28 RX ORDER — TADALAFIL 20 MG/1
TABLET ORAL
Qty: 30 TABLET | Refills: 0 | Status: SHIPPED | OUTPATIENT
Start: 2024-10-28

## 2024-10-28 ASSESSMENT — ENCOUNTER SYMPTOMS
GASTROINTESTINAL NEGATIVE: 1
DYSPNEA WITH EXERTION: 1
CONSTITUTIONAL NEGATIVE: 1
ARTHRALGIAS: 1
NEUROLOGICAL NEGATIVE: 1
DIFFICULTY URINATING: 1
WHEEZING: 1
SHORTNESS OF BREATH: 1
NECK NEGATIVE: 1

## 2024-10-28 NOTE — PREPROCEDURE INSTRUCTIONS
Medication List            Accurate as of October 28, 2024 11:00 AM. Always use your most recent med list.                * albuterol 2.5 mg /3 mL (0.083 %) nebulizer solution  TAKE 3 ML (2.5 MG) BY NEBULIZATION EVERY 6 HOURS IF NEEDED FOR WHEEZING OR SHORTNESS OF BREATH.  Medication Adjustments for Surgery: Take/Use as prescribed     * albuterol 90 mcg/actuation inhaler  INHALE 2 PUFFS BY MOUTH EVERY 4 TO 6 HOURS AS NEEDED  Medication Adjustments for Surgery: Take/Use as prescribed     atorvastatin 20 mg tablet  Commonly known as: Lipitor  TAKE 1 TABLET BY MOUTH EVERY DAY  Medication Adjustments for Surgery: Take/Use as prescribed     Blood Glucose Test strip  Generic drug: blood sugar diagnostic  Medication Adjustments for Surgery: Take/Use as prescribed     * dietary supplement capsule  Additional Medication Adjustments for Surgery: Take last dose 7 days before surgery     * dietary supplement capsule  Additional Medication Adjustments for Surgery: Take last dose 7 days before surgery     * dietary supplement capsule  Additional Medication Adjustments for Surgery: Take last dose 7 days before surgery     ergocalciferol 1.25 MG (63787 UT) capsule  Commonly known as: Vitamin D-2  TAKE 1 CAPSULE BY MOUTH ONE TIME PER WEEK  Medication Adjustments for Surgery: Do Not take on the morning of surgery     Fingerstix Lancets misc  Generic drug: lancets  Medication Adjustments for Surgery: Take/Use as prescribed     losartan 25 mg tablet  Commonly known as: Cozaar  TAKE 1 TABLET BY MOUTH EVERY DAY  Medication Adjustments for Surgery: Take last dose 1 day (24 hours) before surgery  Notes to patient: Do NOT take evening before surgery and do NOT take morning of surgery     meloxicam 15 mg tablet  Commonly known as: Mobic  Additional Medication Adjustments for Surgery: Take last dose 5 days before surgery     metFORMIN  mg 24 hr tablet  Commonly known as: Glucophage-XR  TAKE 2 TABLETS BY MOUTH TWICE A DAY  Medication  Adjustments for Surgery: Do Not take on the morning of surgery     multivitamin tablet  Additional Medication Adjustments for Surgery: Take last dose 7 days before surgery     * tadalafil 5 mg tablet  Commonly known as: Cialis  Take 1 tablet (5 mg) by mouth once daily.  Medication Adjustments for Surgery: Take last dose 3 days before surgery     * tadalafil 20 mg tablet  Commonly known as: Cialis  TAKE ONE TABLET BY MOUTH once DAILY AS NEEDED for erectile dysfunction. take one hour before activity  Medication Adjustments for Surgery: Take last dose 3 days before surgery     Trelegy Ellipta 100-62.5-25 mcg blister with device  Generic drug: fluticasone-umeclidin-vilanter  INHALE 1 PUFF ONCE DAILY. RINSE MOUTH WITH WATER AFTER USE TO REDUCE AFTERTASTE AND INCIDENCE OF CANDIDIASIS. DO NOT SWALLOW.  Medication Adjustments for Surgery: Take/Use as prescribed           * This list has 7 medication(s) that are the same as other medications prescribed for you. Read the directions carefully, and ask your doctor or other care provider to review them with you.                CONTACT SURGEON'S OFFICE IF YOU DEVELOP:  * Fever = 100.4 F   * New respiratory symptoms (e.g. cough, shortness of breath, respiratory distress, sore throat)  * Recent loss of taste or smell  *Flu like symptoms such as headache, fatigue or gastrointestinal symptoms  * You develop any open sores, shingles, burning or painful urination   AND/OR:  * You no longer wish to have the surgery.  * Any other personal circumstances change that may lead to the need to cancel or defer this surgery.  *You were admitted to any hospital within one week of your planned procedure.    SMOKING:  *Quitting smoking can make a huge difference to your health and recovery from surgery.    *If you need help with quitting, call 2-451-QUIT-NOW.    THE DAY BEFORE SURGERY:  *Do not eat any food or drink any liquids after midnight the night before your surgery/procedure.  You may have  sips of water to take medications.    *DIABETICS:    Please check fasting blood sugar upon waking up.  If fasting sugar is <80 mg/dl, please drink 100ml/3oz of apple juice no later than 2 hours prior to arrival time to hospital.    SURGICAL TIME:  *You will be contacted between 2 p.m. and 6 p.m. the business day before your surgery with your arrival time.  *If you haven't received a call by 6pm, call [phone].  *Scheduled surgery times may change and you will be notified if this occurs-check your personal voicemail for any updates.    ON THE MORNING OF SURGERY:  *Wear comfortable, loose fitting clothing.   *Do not use moisturizers, creams, lotions or perfume.  *All jewelry and valuables should be left at home.  *Prosthetic devices such as contact lenses, hearing aids, dentures, eyelash extensions, hairpins and body piercing must be removed before surgery.    BRING WITH YOU:  *Photo ID and insurance card  *Current list of medications and allergies  *Pacemaker/Defibrillator/Heart stent cards  *CPAP machine and mask  *Slings/splints/crutches  *Copy of your complete Advanced Directive/DHPOA-if applicable  *Neurostimulator implant remote    PARKING AND ARRIVAL:  *Check in at the Main Entrance desk and let them know you are here for surgery.  *You will be directed to the 2nd floor surgical waiting area.    IF YOU ARE HAVING OUTPATIENT/SAME DAY SURGERY:  *A responsible adult MUST accompany you at the time of discharge and stay with you for 24 hours after your surgery.  *You may NOT drive yourself home after surgery.  *You may use a taxi or ride sharing service ([employer], Uber) to return home ONLY if you are accompanied by a friend or family member.  *Instructions for resuming your medications will be provided by your surgeon.

## 2024-10-28 NOTE — H&P (VIEW-ONLY)
[facility]/PAT Evaluation       Name: Ravin Camacho (Ravin Camacho)  /Age: 1955/69 y.o.     In-Person         Date of Consult: 10/28/24    Referring Provider:  Dr. Navarro    Date, Surgery, and Length: 24, holmium laser enucleation transurethral prostate cystoscopy, botox injection 100 units, 120 minutes    Patient presents to [facility] for perioperative risk assessment prior to scheduled surgery. Patient presents with history of urothelial cancer that is under surveillance, non obstructing bilateral nephrolithiasis managed by urology and bothersome LUTS. He is having some urinary retention as well with complaints of weak stream, urgency and frequency. He would like to proceed with surgery. He has severe COPD managed by pulmonology. Last seen 10/10/24. Pulmonary recommendations under assessment and plan.    This note was created in part upon personal review of patient's medical records.      Pt denies any past history of anesthetic complications such as PONV, awareness, prolonged sedation, dental damage, aspiration, cardiac arrest, difficult intubation, difficult I.V. access or unexpected hospital admissions.  No history of malignant hyperthermia and or pseudocholinesterase deficiency.    No history of blood transfusions.    The patient is not a [employer] and will accept blood and blood products if medically indicated. Type and screen not sent.      Past Medical History:   Diagnosis Date    Acute otitis media 2023    Bladder cancer (Multi)     S/P CHEMO COMPLETE    BPH (benign prostatic hyperplasia)     Cervical disc disease     CERVICAL RADICULITIS    Cluster headache 2023    COPD (chronic obstructive pulmonary disease) (Multi)     HOME O2 3LNC AT REST, 4LNC WITH EXERTION, DR. Echeverria TASKED FOR CLEARANCE    Diastolic dysfunction     DR. NIDA TRIPLETT APPT 24    Hip pain, right 2023    HL (hearing loss)     Hypertension     Kidney stone     1.4CM LEFT KIDNEY     Lattice degeneration of both retinas 2023    Lumbar disc disease     SPINAL STENOSIS    Lung cancer (Multi)     REOCCURANCE 2021, S/P LOBECTOMY     On home O2     Personal history of irradiation 2022    History of radiation therapy    Pure hypercholesterolemia, unspecified     High cholesterol    Type 2 diabetes mellitus     A1C 5.9 23    Vitamin D deficiency     Wears dentures     LOWER       Past Surgical History:   Procedure Laterality Date    BLADDER SURGERY      TURBT 2023    COLONOSCOPY  2016    Complete Colonoscopy    HAND SURGERY  2016    Hand Surgery                                                                                                                                                          KIDNEY STONE SURGERY      May 2022    LUNG LOBECTOMY  2015    Lung Lobectomy    OTHER SURGICAL HISTORY  10/31/2019    Knee replacement    OTHER SURGICAL HISTORY  2022    Epidural steroid injection    OTHER SURGICAL HISTORY  2022    Bronchoscopy       Family History   Problem Relation Name Age of Onset    Hypertension Mother      Lung cancer Father      No Known Problems Sister      No Known Problems Sister      No Known Problems Brother      No Known Problems Brother      Cancer Other Multiple Family Members      Social History     Tobacco Use   Smoking Status Former    Current packs/day: 0.00    Types: Cigarettes    Quit date:     Years since quittin.8    Passive exposure: Past   Smokeless Tobacco Never     Social History     Substance and Sexual Activity   Alcohol Use Yes    Alcohol/week: 1.0 standard drink of alcohol    Types: 1 Cans of beer per week     Social History     Substance and Sexual Activity   Drug Use Never       No Known Allergies      Current Outpatient Medications:     albuterol 2.5 mg /3 mL (0.083 %) nebulizer solution, TAKE 3 ML (2.5 MG) BY NEBULIZATION EVERY 6 HOURS IF NEEDED FOR WHEEZING OR SHORTNESS OF BREATH., Disp: 75  mL, Rfl: 5    albuterol 90 mcg/actuation inhaler, INHALE 2 PUFFS BY MOUTH EVERY 4 TO 6 HOURS AS NEEDED, Disp: 18 g, Rfl: 11    atorvastatin (Lipitor) 20 mg tablet, TAKE 1 TABLET BY MOUTH EVERY DAY, Disp: 90 tablet, Rfl: 3    dietary supplement capsule, Take 3 capsules by mouth. SPIRULINA, Disp: , Rfl:     dietary supplement capsule, Take 1 capsule by mouth once daily. KRE-ALKALYN, Disp: , Rfl:     dietary supplement capsule, Take 1 capsule by mouth once daily. PROBIOTICS, Disp: , Rfl:     losartan (Cozaar) 25 mg tablet, TAKE 1 TABLET BY MOUTH EVERY DAY, Disp: 100 tablet, Rfl: 2    meloxicam (Mobic) 15 mg tablet, Take 1 tablet (15 mg) by mouth once daily as needed., Disp: , Rfl:     metFORMIN  mg 24 hr tablet, TAKE 2 TABLETS BY MOUTH TWICE A DAY, Disp: 360 tablet, Rfl: 3    multivitamin with minerals (multivitamin with folic acid) tablet, Take 1 tablet by mouth once daily., Disp: , Rfl:     tadalafil (Cialis) 5 mg tablet, Take 1 tablet (5 mg) by mouth once daily., Disp: 90 tablet, Rfl: 3    tadalafil 20 mg tablet, TAKE ONE TABLET BY MOUTH once DAILY AS NEEDED for erectile dysfunction. take one hour before activity, Disp: 30 tablet, Rfl: 0    Trelegy Ellipta 100-62.5-25 mcg blister with device, INHALE 1 PUFF ONCE DAILY. RINSE MOUTH WITH WATER AFTER USE TO REDUCE AFTERTASTE AND INCIDENCE OF CANDIDIASIS. DO NOT SWALLOW., Disp: 60 each, Rfl: 5    blood sugar diagnostic (Blood Glucose Test) strip, 1 strip by in vitro route once daily. (Patient not taking: Reported on 10/28/2024), Disp: , Rfl:     ergocalciferol (Vitamin D-2) 1.25 MG (73970 UT) capsule, TAKE 1 CAPSULE BY MOUTH ONE TIME PER WEEK (Patient taking differently: 1 capsule (1,250 mcg) 1 (one) time per week. SATURDAY), Disp: 12 capsule, Rfl: 2    lancets (Fingerstix Lancets) misc, once daily. (Patient not taking: Reported on 10/28/2024), Disp: , Rfl:       PAT ROS:   Constitutional:   neg    Neuro/Psych:   neg    Eyes:    use of corrective lenses  Ears:  "  neg    Nose:   neg    Mouth:    Lower dentures  Throat:   neg    Neck:   neg    Cardio:    CHAPA  Respiratory:    3L nasal cannula   wheezing (intermittent)   shortness of breath  Endocrine:   GI:   neg    :    difficulty urinating  Musculoskeletal:    arthralgias  Hematologic:   neg    Skin:  neg        Physical Exam  Vitals reviewed. Physical exam within normal limits.   Constitutional:       Appearance: He is obese.   HENT:      Nose:      Comments: Nasal cannula in  place         PAT AIRWAY:   Airway:     Mallampati::  II   Bifid uvula     lower dentures      Visit Vitals  /51   Pulse 88   Temp 36.6 °C (97.9 °F)   Resp 24 Comment: nc 3l   Ht 1.778 m (5' 10\")   Wt 105 kg (232 lb 5.8 oz)   SpO2 94%   BMI 33.34 kg/m²   Smoking Status Former   BSA 2.28 m²       Assessment and Plan:   Patient is a 69 year old female scheduled for holmium laser enucleation transurethral prostate, cystoscopy, botox injection 100 units with Dr. Navarro on 11/11/24.    Patient is at acceptable risk to proceed with planned surgical procedure. Further cardiac risk stratification deferred at this time.This patient is a  HIGH risk candidate (BASED OFF OF HIS SEVERE COPD) undergoing moderate risk procedure, patient is medically optimized for surgery.    Plan      Cardiovascular:    RCRI: 1  Risk of Mace: 6%      Patient denies any chest pain, tightness, heaviness, pressure, radiating pain, palpitations, irregular heartbeats, lightheadedness, cough, congestion, shortness of breath, CHAPA, PND, near syncope, weight loss or gain.    Fair functional capacity    EKG in PAT not indicated. Reviewed last EKG  Encounter Date: 10/10/24   ECG 12 lead   Result Value    Ventricular Rate 72    Atrial Rate 72    RI Interval 120    QRS Duration 86    QT Interval 340    QTC Calculation(Bazett) 372    P Axis 64    R Axis 51    T Axis 40    QRS Count 12    Q Onset 221    P Onset 161    P Offset 210    T Offset 391    QTC Fredericia 361    Narrative    Normal " sinus rhythm  Anterior infarct (cited on or before 20-MAY-2022)  Abnormal ECG  When compared with ECG of 20-FEB-2023 15:09,  Questionable change in initial forces of Anterior leads  Confirmed by Jon Rice ([id number]) on 10/18/2024 11:52:36 AM     TTE 8/9/24:  CONCLUSIONS:   1. Left ventricular ejection fraction is normal, calculated by Person's biplane at 65%.   2. Spectral Doppler shows an impaired relaxation pattern of left ventricular diastolic filling.   3. There is normal right ventricular global systolic function.   4. Mildly enlarged right ventricle.    Patient referred to cardiology by pulmonology team for diastolic function. Cardiology appointment made for December 2024.    HTN- will hold Losartan 24 hours prior to surgery    HLD- will continue statin      Pulm:  Known or suspected WANDA is considered an independent risk factor for difficult mask ventilation, difficult intubation or both.  Increased vigilance is recommended with the use of narcotics due to an increased risk for opioid induced respiratory depression.  The patient may benefit from continuous pulse oximetry to monitor for hypoxic events until baseline Sp02 is normal on room air.      Stop Bang=  4, male, age >50, HTN, obesity    COPD- follows with pulmonology, last seen by Suly Maria CNP on 10/10. PFTs improved to moderate obstruction from severe. Dyspnea on exertion much improved. Patient to continue his 2L night oxygen and 3L day at rest oxygen. Will continue current inhaler regimen.                  Renal/endo:  Recommendations to avoid nephrotoxic drugs and carefully monitor fluid status to maintain euvolemia. Use dose adjusted medications as needed for the underlying level of renal function.    DM II- will hold Metformin DOS    Lab Results   Component Value Date    HGBA1C 5.9 (A) 08/11/2023         GI/:    BPH with LUTS- pending HOLEP with Dr. Navarro      Heme:  Patient instructed to ambulate as soon as possible postoperatively to  decrease thromboembolic risk.    Initiate mechanical DVT prophylaxis as soon as possible and initiate chemical prophylaxis when deemed safe from a bleeding standpoint post surgery.    Caprini= 4        Risk assessment complete.  Patient is scheduled for an INTERMEDIATE surgical risk procedure.He is  considered medically optimized for the planned procedure.        Labs/testing obtained in PAT on 10/28/24: CBC, BMP, UA FLEX    Lab Results   Component Value Date    WBC 7.7 10/28/2024    HGB 16.7 10/28/2024    HCT 51.6 10/28/2024    MCV 98 10/28/2024     10/28/2024     Lab Results   Component Value Date    GLUCOSE 147 (H) 10/28/2024    CALCIUM 9.9 10/28/2024     10/28/2024    K 5.2 10/28/2024    CO2 36 (H) 10/28/2024    CL 98 10/28/2024    BUN 20 10/28/2024    CREATININE 0.91 10/28/2024         Follow up: none        Preoperative medication instructions were provided and reviewed with the patient.  Any additional testing or evaluation was explained to the patient.  Nothing by mouth instructions were discussed and patient's questions were answered prior to conclusion to this encounter.  Patient verbalized understanding of preoperative instructions given in preadmission testing; discharge instructions available in EMR.    This note was dictated with speech recognition.  Minor errors may have been detected during use of speech recognition.

## 2024-10-28 NOTE — CPM/PAT H&P
[facility]/PAT Evaluation       Name: Ravin Camacho (Ravin Camacho)  /Age: 1955/69 y.o.     In-Person         Date of Consult: 10/28/24    Referring Provider:  Dr. Navarro    Date, Surgery, and Length: 24, holmium laser enucleation transurethral prostate cystoscopy, botox injection 100 units, 120 minutes    Patient presents to [facility] for perioperative risk assessment prior to scheduled surgery. Patient presents with history of urothelial cancer that is under surveillance, non obstructing bilateral nephrolithiasis managed by urology and bothersome LUTS. He is having some urinary retention as well with complaints of weak stream, urgency and frequency. He would like to proceed with surgery. He has severe COPD managed by pulmonology. Last seen 10/10/24. Pulmonary recommendations under assessment and plan.    This note was created in part upon personal review of patient's medical records.      Pt denies any past history of anesthetic complications such as PONV, awareness, prolonged sedation, dental damage, aspiration, cardiac arrest, difficult intubation, difficult I.V. access or unexpected hospital admissions.  No history of malignant hyperthermia and or pseudocholinesterase deficiency.    No history of blood transfusions.    The patient is not a Scientologist and will accept blood and blood products if medically indicated. Type and screen not sent.      Past Medical History:   Diagnosis Date    Acute otitis media 2023    Bladder cancer (Multi)     S/P CHEMO COMPLETE    BPH (benign prostatic hyperplasia)     Cervical disc disease     CERVICAL RADICULITIS    Cluster headache 2023    COPD (chronic obstructive pulmonary disease) (Multi)     HOME O2 3LNC AT REST, 4LNC WITH EXERTION, DR. Echeverria TASKED FOR CLEARANCE    Diastolic dysfunction     DR. NIDA TRIPLETT APPT 24    Hip pain, right 2023    HL (hearing loss)     Hypertension     Kidney stone     1.4CM LEFT KIDNEY     Lattice degeneration of both retinas 2023    Lumbar disc disease     SPINAL STENOSIS    Lung cancer (Multi)     REOCCURANCE 2021, S/P LOBECTOMY     On home O2     Personal history of irradiation 2022    History of radiation therapy    Pure hypercholesterolemia, unspecified     High cholesterol    Type 2 diabetes mellitus     A1C 5.9 23    Vitamin D deficiency     Wears dentures     LOWER       Past Surgical History:   Procedure Laterality Date    BLADDER SURGERY      TURBT 2023    COLONOSCOPY  2016    Complete Colonoscopy    HAND SURGERY  2016    Hand Surgery                                                                                                                                                          KIDNEY STONE SURGERY      May 2022    LUNG LOBECTOMY  2015    Lung Lobectomy    OTHER SURGICAL HISTORY  10/31/2019    Knee replacement    OTHER SURGICAL HISTORY  2022    Epidural steroid injection    OTHER SURGICAL HISTORY  2022    Bronchoscopy       Family History   Problem Relation Name Age of Onset    Hypertension Mother      Lung cancer Father      No Known Problems Sister      No Known Problems Sister      No Known Problems Brother      No Known Problems Brother      Cancer Other Multiple Family Members      Social History     Tobacco Use   Smoking Status Former    Current packs/day: 0.00    Types: Cigarettes    Quit date:     Years since quittin.8    Passive exposure: Past   Smokeless Tobacco Never     Social History     Substance and Sexual Activity   Alcohol Use Yes    Alcohol/week: 1.0 standard drink of alcohol    Types: 1 Cans of beer per week     Social History     Substance and Sexual Activity   Drug Use Never       No Known Allergies      Current Outpatient Medications:     albuterol 2.5 mg /3 mL (0.083 %) nebulizer solution, TAKE 3 ML (2.5 MG) BY NEBULIZATION EVERY 6 HOURS IF NEEDED FOR WHEEZING OR SHORTNESS OF BREATH., Disp: 75  mL, Rfl: 5    albuterol 90 mcg/actuation inhaler, INHALE 2 PUFFS BY MOUTH EVERY 4 TO 6 HOURS AS NEEDED, Disp: 18 g, Rfl: 11    atorvastatin (Lipitor) 20 mg tablet, TAKE 1 TABLET BY MOUTH EVERY DAY, Disp: 90 tablet, Rfl: 3    dietary supplement capsule, Take 3 capsules by mouth. SPIRULINA, Disp: , Rfl:     dietary supplement capsule, Take 1 capsule by mouth once daily. KRE-ALKALYN, Disp: , Rfl:     dietary supplement capsule, Take 1 capsule by mouth once daily. PROBIOTICS, Disp: , Rfl:     losartan (Cozaar) 25 mg tablet, TAKE 1 TABLET BY MOUTH EVERY DAY, Disp: 100 tablet, Rfl: 2    meloxicam (Mobic) 15 mg tablet, Take 1 tablet (15 mg) by mouth once daily as needed., Disp: , Rfl:     metFORMIN  mg 24 hr tablet, TAKE 2 TABLETS BY MOUTH TWICE A DAY, Disp: 360 tablet, Rfl: 3    multivitamin with minerals (multivitamin with folic acid) tablet, Take 1 tablet by mouth once daily., Disp: , Rfl:     tadalafil (Cialis) 5 mg tablet, Take 1 tablet (5 mg) by mouth once daily., Disp: 90 tablet, Rfl: 3    tadalafil 20 mg tablet, TAKE ONE TABLET BY MOUTH once DAILY AS NEEDED for erectile dysfunction. take one hour before activity, Disp: 30 tablet, Rfl: 0    Trelegy Ellipta 100-62.5-25 mcg blister with device, INHALE 1 PUFF ONCE DAILY. RINSE MOUTH WITH WATER AFTER USE TO REDUCE AFTERTASTE AND INCIDENCE OF CANDIDIASIS. DO NOT SWALLOW., Disp: 60 each, Rfl: 5    blood sugar diagnostic (Blood Glucose Test) strip, 1 strip by in vitro route once daily. (Patient not taking: Reported on 10/28/2024), Disp: , Rfl:     ergocalciferol (Vitamin D-2) 1.25 MG (51561 UT) capsule, TAKE 1 CAPSULE BY MOUTH ONE TIME PER WEEK (Patient taking differently: 1 capsule (1,250 mcg) 1 (one) time per week. SATURDAY), Disp: 12 capsule, Rfl: 2    lancets (Fingerstix Lancets) misc, once daily. (Patient not taking: Reported on 10/28/2024), Disp: , Rfl:       PAT ROS:   Constitutional:   neg    Neuro/Psych:   neg    Eyes:    use of corrective lenses  Ears:  "  neg    Nose:   neg    Mouth:    Lower dentures  Throat:   neg    Neck:   neg    Cardio:    CHAPA  Respiratory:    3L nasal cannula   wheezing (intermittent)   shortness of breath  Endocrine:   GI:   neg    :    difficulty urinating  Musculoskeletal:    arthralgias  Hematologic:   neg    Skin:  neg        Physical Exam  Vitals reviewed. Physical exam within normal limits.   Constitutional:       Appearance: He is obese.   HENT:      Nose:      Comments: Nasal cannula in  place         PAT AIRWAY:   Airway:     Mallampati::  II   Bifid uvula     lower dentures      Visit Vitals  /51   Pulse 88   Temp 36.6 °C (97.9 °F)   Resp 24 Comment: nc 3l   Ht 1.778 m (5' 10\")   Wt 105 kg (232 lb 5.8 oz)   SpO2 94%   BMI 33.34 kg/m²   Smoking Status Former   BSA 2.28 m²       Assessment and Plan:   Patient is a 69 year old female scheduled for holmium laser enucleation transurethral prostate, cystoscopy, botox injection 100 units with Dr. Navarro on 11/11/24.    Patient is at acceptable risk to proceed with planned surgical procedure. Further cardiac risk stratification deferred at this time.This patient is a  HIGH risk candidate (BASED OFF OF HIS SEVERE COPD) undergoing moderate risk procedure, patient is medically optimized for surgery.    Plan      Cardiovascular:    RCRI: 1  Risk of Mace: 6%      Patient denies any chest pain, tightness, heaviness, pressure, radiating pain, palpitations, irregular heartbeats, lightheadedness, cough, congestion, shortness of breath, CHAPA, PND, near syncope, weight loss or gain.    Fair functional capacity    EKG in PAT not indicated. Reviewed last EKG  Encounter Date: 10/10/24   ECG 12 lead   Result Value    Ventricular Rate 72    Atrial Rate 72    ID Interval 120    QRS Duration 86    QT Interval 340    QTC Calculation(Bazett) 372    P Axis 64    R Axis 51    T Axis 40    QRS Count 12    Q Onset 221    P Onset 161    P Offset 210    T Offset 391    QTC Fredericia 361    Narrative    Normal " sinus rhythm  Anterior infarct (cited on or before 20-MAY-2022)  Abnormal ECG  When compared with ECG of 20-FEB-2023 15:09,  Questionable change in initial forces of Anterior leads  Confirmed by Jon Rice ([id number]) on 10/18/2024 11:52:36 AM     TTE 8/9/24:  CONCLUSIONS:   1. Left ventricular ejection fraction is normal, calculated by Person's biplane at 65%.   2. Spectral Doppler shows an impaired relaxation pattern of left ventricular diastolic filling.   3. There is normal right ventricular global systolic function.   4. Mildly enlarged right ventricle.    Patient referred to cardiology by pulmonology team for diastolic function. Cardiology appointment made for December 2024.    HTN- will hold Losartan 24 hours prior to surgery    HLD- will continue statin      Pulm:  Known or suspected WANDA is considered an independent risk factor for difficult mask ventilation, difficult intubation or both.  Increased vigilance is recommended with the use of narcotics due to an increased risk for opioid induced respiratory depression.  The patient may benefit from continuous pulse oximetry to monitor for hypoxic events until baseline Sp02 is normal on room air.      Stop Bang=  4, male, age >50, HTN, obesity    COPD- follows with pulmonology, last seen by Suly Maria CNP on 10/10. PFTs improved to moderate obstruction from severe. Dyspnea on exertion much improved. Patient to continue his 2L night oxygen and 3L day at rest oxygen. Will continue current inhaler regimen.                  Renal/endo:  Recommendations to avoid nephrotoxic drugs and carefully monitor fluid status to maintain euvolemia. Use dose adjusted medications as needed for the underlying level of renal function.    DM II- will hold Metformin DOS    Lab Results   Component Value Date    HGBA1C 5.9 (A) 08/11/2023         GI/:    BPH with LUTS- pending HOLEP with Dr. Navarro      Heme:  Patient instructed to ambulate as soon as possible postoperatively to  decrease thromboembolic risk.    Initiate mechanical DVT prophylaxis as soon as possible and initiate chemical prophylaxis when deemed safe from a bleeding standpoint post surgery.    Caprini= 4        Risk assessment complete.  Patient is scheduled for an INTERMEDIATE surgical risk procedure.He is  considered medically optimized for the planned procedure.        Labs/testing obtained in PAT on 10/28/24: CBC, BMP, UA FLEX    Lab Results   Component Value Date    WBC 7.7 10/28/2024    HGB 16.7 10/28/2024    HCT 51.6 10/28/2024    MCV 98 10/28/2024     10/28/2024     Lab Results   Component Value Date    GLUCOSE 147 (H) 10/28/2024    CALCIUM 9.9 10/28/2024     10/28/2024    K 5.2 10/28/2024    CO2 36 (H) 10/28/2024    CL 98 10/28/2024    BUN 20 10/28/2024    CREATININE 0.91 10/28/2024         Follow up: none        Preoperative medication instructions were provided and reviewed with the patient.  Any additional testing or evaluation was explained to the patient.  Nothing by mouth instructions were discussed and patient's questions were answered prior to conclusion to this encounter.  Patient verbalized understanding of preoperative instructions given in preadmission testing; discharge instructions available in EMR.    This note was dictated with speech recognition.  Minor errors may have been detected during use of speech recognition.

## 2024-10-30 LAB — BACTERIA UR CULT: NO GROWTH

## 2024-11-03 DIAGNOSIS — E55.9 VITAMIN D DEFICIENCY, UNSPECIFIED: ICD-10-CM

## 2024-11-05 RX ORDER — ERGOCALCIFEROL 1.25 MG/1
1.25 CAPSULE ORAL WEEKLY
Qty: 12 CAPSULE | Refills: 2 | Status: SHIPPED | OUTPATIENT
Start: 2024-11-05

## 2024-11-10 ENCOUNTER — ANESTHESIA EVENT (OUTPATIENT)
Dept: OPERATING ROOM | Facility: HOSPITAL | Age: 69
End: 2024-11-10
Payer: MEDICARE

## 2024-11-10 DIAGNOSIS — E78.5 HYPERLIPIDEMIA, UNSPECIFIED: ICD-10-CM

## 2024-11-11 ENCOUNTER — ANESTHESIA (OUTPATIENT)
Dept: OPERATING ROOM | Facility: HOSPITAL | Age: 69
End: 2024-11-11
Payer: MEDICARE

## 2024-11-11 ENCOUNTER — HOSPITAL ENCOUNTER (OUTPATIENT)
Facility: HOSPITAL | Age: 69
Setting detail: OUTPATIENT SURGERY
Discharge: HOME | End: 2024-11-11
Attending: UROLOGY | Admitting: UROLOGY
Payer: MEDICARE

## 2024-11-11 ENCOUNTER — PHARMACY VISIT (OUTPATIENT)
Dept: PHARMACY | Facility: CLINIC | Age: 69
End: 2024-11-11
Payer: COMMERCIAL

## 2024-11-11 VITALS
RESPIRATION RATE: 20 BRPM | OXYGEN SATURATION: 95 % | WEIGHT: 238.54 LBS | BODY MASS INDEX: 34.23 KG/M2 | SYSTOLIC BLOOD PRESSURE: 159 MMHG | HEART RATE: 73 BPM | TEMPERATURE: 97.5 F | DIASTOLIC BLOOD PRESSURE: 79 MMHG

## 2024-11-11 DIAGNOSIS — N13.8 ENLARGED PROSTATE WITH URINARY OBSTRUCTION: Primary | ICD-10-CM

## 2024-11-11 DIAGNOSIS — N32.81 OAB (OVERACTIVE BLADDER): ICD-10-CM

## 2024-11-11 DIAGNOSIS — N40.1 ENLARGED PROSTATE WITH URINARY OBSTRUCTION: Primary | ICD-10-CM

## 2024-11-11 LAB
ABO GROUP (TYPE) IN BLOOD: NORMAL
ANTIBODY SCREEN: NORMAL
GLUCOSE BLD MANUAL STRIP-MCNC: 120 MG/DL (ref 74–99)
GLUCOSE BLD MANUAL STRIP-MCNC: 126 MG/DL (ref 74–99)
RH FACTOR (ANTIGEN D): NORMAL

## 2024-11-11 PROCEDURE — 7100000010 HC PHASE TWO TIME - EACH INCREMENTAL 1 MINUTE: Performed by: UROLOGY

## 2024-11-11 PROCEDURE — RXMED WILLOW AMBULATORY MEDICATION CHARGE

## 2024-11-11 PROCEDURE — 7100000002 HC RECOVERY ROOM TIME - EACH INCREMENTAL 1 MINUTE: Performed by: UROLOGY

## 2024-11-11 PROCEDURE — 52287 CYSTOSCOPY CHEMODENERVATION: CPT | Performed by: UROLOGY

## 2024-11-11 PROCEDURE — 82947 ASSAY GLUCOSE BLOOD QUANT: CPT

## 2024-11-11 PROCEDURE — 2500000005 HC RX 250 GENERAL PHARMACY W/O HCPCS: Performed by: UROLOGY

## 2024-11-11 PROCEDURE — 2500000004 HC RX 250 GENERAL PHARMACY W/ HCPCS (ALT 636 FOR OP/ED): Mod: JZ | Performed by: UROLOGY

## 2024-11-11 PROCEDURE — 2500000001 HC RX 250 WO HCPCS SELF ADMINISTERED DRUGS (ALT 637 FOR MEDICARE OP): Performed by: ANESTHESIOLOGY

## 2024-11-11 PROCEDURE — 2500000005 HC RX 250 GENERAL PHARMACY W/O HCPCS: Performed by: ANESTHESIOLOGY

## 2024-11-11 PROCEDURE — 7100000001 HC RECOVERY ROOM TIME - INITIAL BASE CHARGE: Performed by: UROLOGY

## 2024-11-11 PROCEDURE — 96372 THER/PROPH/DIAG INJ SC/IM: CPT | Performed by: UROLOGY

## 2024-11-11 PROCEDURE — 88307 TISSUE EXAM BY PATHOLOGIST: CPT | Mod: TC,AHULAB | Performed by: UROLOGY

## 2024-11-11 PROCEDURE — C1758 CATHETER, URETERAL: HCPCS | Performed by: UROLOGY

## 2024-11-11 PROCEDURE — 86901 BLOOD TYPING SEROLOGIC RH(D): CPT | Performed by: UROLOGY

## 2024-11-11 PROCEDURE — 2500000004 HC RX 250 GENERAL PHARMACY W/ HCPCS (ALT 636 FOR OP/ED): Performed by: NURSE ANESTHETIST, CERTIFIED REGISTERED

## 2024-11-11 PROCEDURE — C1889 IMPLANT/INSERT DEVICE, NOC: HCPCS | Performed by: UROLOGY

## 2024-11-11 PROCEDURE — 3600000009 HC OR TIME - EACH INCREMENTAL 1 MINUTE - PROCEDURE LEVEL FOUR: Performed by: UROLOGY

## 2024-11-11 PROCEDURE — 3700000002 HC GENERAL ANESTHESIA TIME - EACH INCREMENTAL 1 MINUTE: Performed by: UROLOGY

## 2024-11-11 PROCEDURE — 52649 PROSTATE LASER ENUCLEATION: CPT | Performed by: UROLOGY

## 2024-11-11 PROCEDURE — 3700000001 HC GENERAL ANESTHESIA TIME - INITIAL BASE CHARGE: Performed by: UROLOGY

## 2024-11-11 PROCEDURE — A52649 PR LASER ENUCLEATION PROSTATE W MORCELLATION: Performed by: NURSE ANESTHETIST, CERTIFIED REGISTERED

## 2024-11-11 PROCEDURE — 7100000009 HC PHASE TWO TIME - INITIAL BASE CHARGE: Performed by: UROLOGY

## 2024-11-11 PROCEDURE — 36415 COLL VENOUS BLD VENIPUNCTURE: CPT | Performed by: UROLOGY

## 2024-11-11 PROCEDURE — A52649 PR LASER ENUCLEATION PROSTATE W MORCELLATION: Performed by: ANESTHESIOLOGY

## 2024-11-11 PROCEDURE — 3600000004 HC OR TIME - INITIAL BASE CHARGE - PROCEDURE LEVEL FOUR: Performed by: UROLOGY

## 2024-11-11 PROCEDURE — 2720000007 HC OR 272 NO HCPCS: Performed by: UROLOGY

## 2024-11-11 RX ORDER — CEFAZOLIN SODIUM 2 G/100ML
2 INJECTION, SOLUTION INTRAVENOUS ONCE
Status: DISCONTINUED | OUTPATIENT
Start: 2024-11-11 | End: 2024-11-11 | Stop reason: HOSPADM

## 2024-11-11 RX ORDER — ONDANSETRON HYDROCHLORIDE 2 MG/ML
INJECTION, SOLUTION INTRAVENOUS AS NEEDED
Status: DISCONTINUED | OUTPATIENT
Start: 2024-11-11 | End: 2024-11-11

## 2024-11-11 RX ORDER — LIDOCAINE HYDROCHLORIDE 20 MG/ML
INJECTION, SOLUTION INFILTRATION; PERINEURAL AS NEEDED
Status: DISCONTINUED | OUTPATIENT
Start: 2024-11-11 | End: 2024-11-11

## 2024-11-11 RX ORDER — CEFAZOLIN 1 G/1
INJECTION, POWDER, FOR SOLUTION INTRAVENOUS AS NEEDED
Status: DISCONTINUED | OUTPATIENT
Start: 2024-11-11 | End: 2024-11-11

## 2024-11-11 RX ORDER — FENTANYL CITRATE 50 UG/ML
INJECTION, SOLUTION INTRAMUSCULAR; INTRAVENOUS AS NEEDED
Status: DISCONTINUED | OUTPATIENT
Start: 2024-11-11 | End: 2024-11-11

## 2024-11-11 RX ORDER — SODIUM CHLORIDE 0.9 G/100ML
IRRIGANT IRRIGATION AS NEEDED
Status: DISCONTINUED | OUTPATIENT
Start: 2024-11-11 | End: 2024-11-11 | Stop reason: HOSPADM

## 2024-11-11 RX ORDER — SULFAMETHOXAZOLE AND TRIMETHOPRIM 800; 160 MG/1; MG/1
1 TABLET ORAL 2 TIMES DAILY
Qty: 6 TABLET | Refills: 0 | Status: SHIPPED | OUTPATIENT
Start: 2024-11-11 | End: 2024-11-21 | Stop reason: ALTCHOICE

## 2024-11-11 RX ORDER — DOCUSATE SODIUM 100 MG/1
100 CAPSULE, LIQUID FILLED ORAL 2 TIMES DAILY
Qty: 30 CAPSULE | Refills: 0 | Status: SHIPPED | OUTPATIENT
Start: 2024-11-11 | End: 2024-11-21 | Stop reason: WASHOUT

## 2024-11-11 RX ORDER — LIDOCAINE HYDROCHLORIDE 10 MG/ML
0.1 INJECTION, SOLUTION EPIDURAL; INFILTRATION; INTRACAUDAL; PERINEURAL ONCE
Status: DISCONTINUED | OUTPATIENT
Start: 2024-11-11 | End: 2024-11-11 | Stop reason: HOSPADM

## 2024-11-11 RX ORDER — OXYCODONE HYDROCHLORIDE 5 MG/1
5 TABLET ORAL EVERY 6 HOURS PRN
Qty: 4 TABLET | Refills: 0 | Status: SHIPPED | OUTPATIENT
Start: 2024-11-11 | End: 2024-11-21 | Stop reason: ALTCHOICE

## 2024-11-11 RX ORDER — PROPOFOL 10 MG/ML
INJECTION, EMULSION INTRAVENOUS AS NEEDED
Status: DISCONTINUED | OUTPATIENT
Start: 2024-11-11 | End: 2024-11-11

## 2024-11-11 RX ORDER — ATORVASTATIN CALCIUM 20 MG/1
20 TABLET, FILM COATED ORAL DAILY
Qty: 90 TABLET | Refills: 3 | Status: SHIPPED | OUTPATIENT
Start: 2024-11-11

## 2024-11-11 RX ORDER — MIDAZOLAM HYDROCHLORIDE 1 MG/ML
INJECTION INTRAMUSCULAR; INTRAVENOUS AS NEEDED
Status: DISCONTINUED | OUTPATIENT
Start: 2024-11-11 | End: 2024-11-11

## 2024-11-11 RX ORDER — PHENAZOPYRIDINE HYDROCHLORIDE 100 MG/1
100 TABLET, FILM COATED ORAL 3 TIMES DAILY PRN
Qty: 15 TABLET | Refills: 0 | Status: SHIPPED | OUTPATIENT
Start: 2024-11-11 | End: 2024-11-21 | Stop reason: ALTCHOICE

## 2024-11-11 RX ORDER — SODIUM CHLORIDE, SODIUM LACTATE, POTASSIUM CHLORIDE, CALCIUM CHLORIDE 600; 310; 30; 20 MG/100ML; MG/100ML; MG/100ML; MG/100ML
100 INJECTION, SOLUTION INTRAVENOUS CONTINUOUS
Status: DISCONTINUED | OUTPATIENT
Start: 2024-11-11 | End: 2024-11-11 | Stop reason: HOSPADM

## 2024-11-11 RX ORDER — ACETAMINOPHEN 325 MG/1
650 TABLET ORAL EVERY 4 HOURS PRN
Status: DISCONTINUED | OUTPATIENT
Start: 2024-11-11 | End: 2024-11-11 | Stop reason: HOSPADM

## 2024-11-11 RX ORDER — OXYCODONE HYDROCHLORIDE 5 MG/1
5 TABLET ORAL EVERY 4 HOURS PRN
Status: DISCONTINUED | OUTPATIENT
Start: 2024-11-11 | End: 2024-11-11 | Stop reason: HOSPADM

## 2024-11-11 RX ORDER — WATER 1 ML/ML
IRRIGANT IRRIGATION AS NEEDED
Status: DISCONTINUED | OUTPATIENT
Start: 2024-11-11 | End: 2024-11-11 | Stop reason: HOSPADM

## 2024-11-11 SDOH — HEALTH STABILITY: MENTAL HEALTH: CURRENT SMOKER: 0

## 2024-11-11 ASSESSMENT — PAIN SCALES - GENERAL
PAINLEVEL_OUTOF10: 4
PAINLEVEL_OUTOF10: 0 - NO PAIN
PAINLEVEL_OUTOF10: 4
PAINLEVEL_OUTOF10: 0 - NO PAIN
PAINLEVEL_OUTOF10: 4
PAINLEVEL_OUTOF10: 4

## 2024-11-11 ASSESSMENT — PAIN - FUNCTIONAL ASSESSMENT
PAIN_FUNCTIONAL_ASSESSMENT: 0-10
PAIN_FUNCTIONAL_ASSESSMENT: UNABLE TO SELF-REPORT
PAIN_FUNCTIONAL_ASSESSMENT: 0-10

## 2024-11-11 NOTE — DISCHARGE INSTRUCTIONS
DEPARTMENT OF UROLOGY  DISCHARGE INSTRUCTIONS -- Holmium Laser   Outpatient Surgery    C O N F I D E N T I A L   I N F O R M A T I O N    Ravin Camacho        Call [phone] during regular daytime business hours (8:00 am - 5:00 pm) and after 5:00 pm and ask for the Urology resident with any questions or concerns.      If it is a life-threatening situation, proceed to the nearest emergency department.        Follow-up appointment:  trial of void, as per schedule     Thank you for the opportunity to care for you today.  Your health and healing are very important to us.  We hope we made you feel as comfortable as possible and are committed to your recovery and continued well-being.      The following is a brief overview of your transurethral prostate resection today. Some of the information contained on this summary may be confidential.  This information should be kept in your records and should be shared with your regular doctor.    Physicians:   Dr. Navarro      Procedure performed: Prostate Resection  Pending results:   pathology of tissue taken from your prostate    What to Expect During your Recovery and Home Care  Anesthesia Side Effects   You received general anesthesia today.  You may feel sleepy, tired, or have a sore throat.   You may also feel drowsiness, dizziness, or inability to think clearly.  For your safety, do not drive, drink alcoholic beverages, take any unprescribed medication or make any important decisions for 24 hours.  A responsible adult should be with you for 24 hours.        Activity and Recovery    No heavy lifting over ten pounds x2 weeks. Limit activity while urinary catheter is in place. Avoid activities that would cause pulling or tugging on your catheter.    Do not drive or operate heavy machinery while taking narcotic pain medications as these medications can alter perception, impair judgement, and slow reaction times.    Pain Control  Unfortunately, you may experience pain  after your procedure.  Adequate management can include alternative measures to help ease your pain and can include over the counter Tylenol or Advil can be taken as prescribed as needed for breakthrough pain. Do not take more than 4,000mg of Tylenol in a 24-hour period.      You may also experience bladder spasms due to the catheter.     Nausea/Vomiting   Clear liquids are best tolerated at first. Start slow, advance your diet as tolerated to normal foods. Avoid spicy, greasy, heavy foods at first. Also, you may feel nauseous or like you need to vomit if you take any type of medication on an empty stomach.  Call your physician if you are unable to eat or drink and have persistent vomiting.    Signs of Bleeding   You are going to have some blood in your urine. Your urine will be light pink to yellow. You always want to look at the urine in the tubing of your catheter and not in the large urine collection bag to check for bleeding. If urine becomes thick dark red, has large clots or stops draining, please notify your physician.    Treatment/wound care:   Keep area(s) clean and dry. Clean around the tip or your penis were the catheter goes in daily with mild soap and water.  It is okay to shower 24 hours after time of surgery.    Do not submerge your catheter in standing water until seen for follow up appointment (no tub bathing, swimming, or hot tubs).      Signs of Infection  Signs of infection can include fever, drainage(green/yellow), chills, burning sensation with passing of urine, catheter leakage, or severe abdominal pain.  If you see any of these occur, please contact your doctor's office at [phone].  Any fever higher than 100.4, especially if associated with an ill feeling, abdominal pain, chills, or nausea should be reported to your surgeon.      Assist in bowel movements/urination  Increase fiber in diet  Increase water (6 to 8 glasses)  Increase walking     Additional Instructions: CATHETER  CARE  Always keep the catheters tubing and drainage bag below the level of your bladder.  Avoid loops and kinks in the catheter tubing.  NOTIFY your physician if catheter falls out or catheter seems clogged and urine is not draining.   Do not wear the small leg bag to bed you should be provided with a larger overnight bag that you should wear to bed and can hang over the side of the bed.  We recommend wearing the large bag in the shower, as this is easy to dry, and you do not get your leg straps wet from your leg bag.   Your catheter should be secured to your upper thigh, do not allow it to hang or dangle.  Your catheter will be removed at your post-operative appointment.

## 2024-11-11 NOTE — ANESTHESIA POSTPROCEDURE EVALUATION
Patient: Ravin Camacho    Procedure Summary       Date: 11/11/24 Room / Location: [facility] A OR 01 / Virtual U A OR    Anesthesia Start: 0936 Anesthesia Stop: 1042    Procedures:       Holmium Laser Enucleation Transurethral Prostate (Prostate)      Cystoscopy; Botox Injection ~ 100 Units (Bladder) Diagnosis:       Enlarged prostate with urinary obstruction      OAB (overactive bladder)      (Enlarged prostate with urinary obstruction [N40.1, N13.8])      (OAB (overactive bladder) [N32.81])    Surgeons: Ravin Navarro MD Responsible Provider: Basim Johnston MD    Anesthesia Type: general ASA Status: 2            Anesthesia Type: general    Vitals Value Taken Time   /82 11/11/24 1117   Temp 36.4 °C (97.5 °F) 11/11/24 1038   Pulse 73 11/11/24 1128   Resp 22 11/11/24 1115   SpO2 96 % 11/11/24 1128   Vitals shown include unfiled device data.    Anesthesia Post Evaluation    Patient location during evaluation: PACU  Patient participation: complete - patient participated  Level of consciousness: awake  Pain management: adequate  Airway patency: patent  Cardiovascular status: acceptable  Respiratory status: acceptable  Hydration status: acceptable  Postoperative Nausea and Vomiting: none        No notable events documented.

## 2024-11-11 NOTE — PROGRESS NOTES
HPI    69 y.o. male being seen with the following problem list:    Problem list:  T1 papillary urothelial carcinoma. Surveillance by Dr. Frausto   Non obstructing bilateral nephrolithiasis without hydronephrosis. Managed by Dr. Ambriz  Bothersome LUTS     - Renal US on 4/18/24 demonstrated no bladder mass. Non obstructing bilateral nephrolithiasis without hydronephrosis. 1.4cm stone in the left kidney, will continue to follow with Dr. Ambriz for treatment on this.      5/14/24 CT abdomen/pelvis  1. Multiple bilateral nonobstructing renal calculi as detailed above.  No ureteral stones or hydroureteronephrosis identified.  2. No evidence of metastatic disease identified in the abdomen or pelvis.  3. Prostatomegaly. Correlate with serum PSA     3/29/24 microUA - neg blood in urine  3/29/24 Ucx - neg     5/3/24 microUA - neg blood in urine  5/3/24 Ucx- neg     5/31/24 microUA - neg blood in urine  5/31/24 Ucx - neg     10/09/24 - PVR 100cc. Bothersome LUTs like Weak stream, urgency and frequency. Has worsening COPD, is on oxygen therapy.      11/11/24 S/p HoLEP + Botox 100U. Path pending    11/13/24 - Presents for TOV. Passed TOV        Lab Results   Component Value Date    PSA 3.64 08/11/2023              Current Medications:  Current Outpatient Medications   Medication Sig Dispense Refill    albuterol 2.5 mg /3 mL (0.083 %) nebulizer solution TAKE 3 ML (2.5 MG) BY NEBULIZATION EVERY 6 HOURS IF NEEDED FOR WHEEZING OR SHORTNESS OF BREATH. 75 mL 5    albuterol 90 mcg/actuation inhaler INHALE 2 PUFFS BY MOUTH EVERY 4 TO 6 HOURS AS NEEDED 18 g 11    atorvastatin (Lipitor) 20 mg tablet TAKE 1 TABLET BY MOUTH EVERY DAY 90 tablet 3    blood sugar diagnostic (Blood Glucose Test) strip 1 strip by in vitro route once daily.      dietary supplement capsule Take 3 capsules by mouth. SPIRULINA      dietary supplement capsule Take 1 capsule by mouth once daily. KRE-ALKALYN      dietary supplement capsule Take 1 capsule by mouth once  daily. PROBIOTICS      docusate sodium (Colace) 100 mg capsule Take 1 capsule (100 mg) by mouth 2 times a day. Hold for loose stools 30 capsule 0    ergocalciferol (Vitamin D-2) 1.25 MG (36353 UT) capsule Take 1 capsule (1,250 mcg) by mouth 1 (one) time per week. SATURDAY 12 capsule 2    lancets (Fingerstix Lancets) misc once daily.      losartan (Cozaar) 25 mg tablet TAKE 1 TABLET BY MOUTH EVERY  tablet 2    meloxicam (Mobic) 15 mg tablet Take 1 tablet (15 mg) by mouth once daily as needed.      metFORMIN  mg 24 hr tablet TAKE 2 TABLETS BY MOUTH TWICE A  tablet 3    multivitamin with minerals (multivitamin with folic acid) tablet Take 1 tablet by mouth once daily.      oxyCODONE (Roxicodone) 5 mg immediate release tablet Take 1 tablet (5 mg) by mouth every 6 hours if needed for severe pain (7 - 10) for up to 3 days. 4 tablet 0    phenazopyridine (Pyridium) 100 mg tablet Take 1 tablet (100 mg) by mouth 3 times a day as needed (burning). 15 tablet 0    sulfamethoxazole-trimethoprim (Bactrim DS) 800-160 mg tablet Take 1 tablet by mouth 2 times a day for 3 days. 6 tablet 0    tadalafil 20 mg tablet Take 1 tablet by mouth once daily as needed for erectile dysfunction. Take one hour before activity 30 tablet 0    Trelegy Ellipta 100-62.5-25 mcg blister with device INHALE 1 PUFF ONCE DAILY. RINSE MOUTH WITH WATER AFTER USE TO REDUCE AFTERTASTE AND INCIDENCE OF CANDIDIASIS. DO NOT SWALLOW. 60 each 5     No current facility-administered medications for this visit.        Active Problems:  Ravin Camacho is a 69 y.o. male with the following Problems and Medications.  Patient Active Problem List   Diagnosis    Abnormal finding on MRI of brain    Asymmetrical hearing loss of left ear    Benign essential hypertension    Bladder cancer (Multi)    Bladder mass    Calculus of kidney    Cervical radiculitis    Conjunctival concretions of right eye    COPD (chronic obstructive pulmonary disease) (Multi)     Diabetes mellitus type 2 without retinopathy (Multi)    Difficulty walking    Dysfunction of both eustachian tubes    Dyspnea    Epididymitis    Erectile dysfunction    Erythrocytosis    Fatigue    Hearing loss, mixed, unilateral    History of lung cancer    Hyperlipidemia    Hyperopia of both eyes with astigmatism and presbyopia    Hyperopia with astigmatism and presbyopia    Low HDL (under 40)    Vitamin D deficiency    Status post cystoscopy with ureteral stent placement    Primary localized osteoarthrosis, lower leg    Peripheral neuropathy    Paresthesia    Obesity    Nuclear sclerosis of both eyes    Multiple nevi    Mass of finger of left hand    Malignant neoplasm of lung (Multi)    Low testosterone    Spinal stenosis    Lump in the testicle    Lung nodule    Microscopic hematuria    Personal history of colonic polyps    Weak urinary stream    Melanocytic nevi of trunk    Neoplasm of unspecified behavior of bone, soft tissue, and skin    Other specified congenital malformations of skin    Chest discomfort    Class 1 obesity with body mass index (BMI) of 30.0 to 30.9 in adult    Healthcare maintenance    Benign prostatic hyperplasia with urinary frequency    OAB (overactive bladder)    Enlarged prostate with urinary obstruction     Current Outpatient Medications   Medication Sig Dispense Refill    albuterol 2.5 mg /3 mL (0.083 %) nebulizer solution TAKE 3 ML (2.5 MG) BY NEBULIZATION EVERY 6 HOURS IF NEEDED FOR WHEEZING OR SHORTNESS OF BREATH. 75 mL 5    albuterol 90 mcg/actuation inhaler INHALE 2 PUFFS BY MOUTH EVERY 4 TO 6 HOURS AS NEEDED 18 g 11    atorvastatin (Lipitor) 20 mg tablet TAKE 1 TABLET BY MOUTH EVERY DAY 90 tablet 3    blood sugar diagnostic (Blood Glucose Test) strip 1 strip by in vitro route once daily.      dietary supplement capsule Take 3 capsules by mouth. SPIRULINA      dietary supplement capsule Take 1 capsule by mouth once daily. KRE-ALKALYN      dietary supplement capsule Take 1  capsule by mouth once daily. PROBIOTICS      docusate sodium (Colace) 100 mg capsule Take 1 capsule (100 mg) by mouth 2 times a day. Hold for loose stools 30 capsule 0    ergocalciferol (Vitamin D-2) 1.25 MG (53968 UT) capsule Take 1 capsule (1,250 mcg) by mouth 1 (one) time per week. SATURDAY 12 capsule 2    lancets (Fingerstix Lancets) misc once daily.      losartan (Cozaar) 25 mg tablet TAKE 1 TABLET BY MOUTH EVERY  tablet 2    meloxicam (Mobic) 15 mg tablet Take 1 tablet (15 mg) by mouth once daily as needed.      metFORMIN  mg 24 hr tablet TAKE 2 TABLETS BY MOUTH TWICE A  tablet 3    multivitamin with minerals (multivitamin with folic acid) tablet Take 1 tablet by mouth once daily.      oxyCODONE (Roxicodone) 5 mg immediate release tablet Take 1 tablet (5 mg) by mouth every 6 hours if needed for severe pain (7 - 10) for up to 3 days. 4 tablet 0    phenazopyridine (Pyridium) 100 mg tablet Take 1 tablet (100 mg) by mouth 3 times a day as needed (burning). 15 tablet 0    sulfamethoxazole-trimethoprim (Bactrim DS) 800-160 mg tablet Take 1 tablet by mouth 2 times a day for 3 days. 6 tablet 0    tadalafil 20 mg tablet Take 1 tablet by mouth once daily as needed for erectile dysfunction. Take one hour before activity 30 tablet 0    Trelegy Ellipta 100-62.5-25 mcg blister with device INHALE 1 PUFF ONCE DAILY. RINSE MOUTH WITH WATER AFTER USE TO REDUCE AFTERTASTE AND INCIDENCE OF CANDIDIASIS. DO NOT SWALLOW. 60 each 5     No current facility-administered medications for this visit.       PMH:  Past Medical History:   Diagnosis Date    Acute otitis media 04/05/2023    Bladder cancer (Multi)     S/P CHEMO COMPLETE    BPH (benign prostatic hyperplasia)     Cervical disc disease     CERVICAL RADICULITIS    Cluster headache 04/05/2023    COPD (chronic obstructive pulmonary disease) (Multi)     HOME O2 3LNC AT REST, 4LNC WITH EXERTION, DR. Echeverria TASKED FOR CLEARANCE    Diastolic dysfunction     NEW, DR.  NIDA APPT 24    Hip pain, right 2023    HL (hearing loss)     Hypertension     Kidney stone     1.4CM LEFT KIDNEY    Lattice degeneration of both retinas 2023    Lumbar disc disease     SPINAL STENOSIS    Lung cancer (Multi)     REOCCURANCE 2021, S/P LOBECTOMY     On home O2     Personal history of irradiation 2022    History of radiation therapy    Pure hypercholesterolemia, unspecified     High cholesterol    Type 2 diabetes mellitus     A1C 5.9 23    Vitamin D deficiency     Wears dentures     LOWER       PSH:  Past Surgical History:   Procedure Laterality Date    BLADDER SURGERY      TURBT 2023    COLONOSCOPY  2016    Complete Colonoscopy    HAND SURGERY  2016    Hand Surgery                                                                                                                                                          KIDNEY STONE SURGERY      May 2022    KNEE ARTHROPLASTY      right knee    LUNG LOBECTOMY  2015    Lung Lobectomy    OTHER SURGICAL HISTORY  10/31/2019    Knee replacement    OTHER SURGICAL HISTORY  2022    Epidural steroid injection    OTHER SURGICAL HISTORY  2022    Bronchoscopy       FMH:  Family History   Problem Relation Name Age of Onset    Hypertension Mother      Lung cancer Father      No Known Problems Sister      No Known Problems Sister      No Known Problems Brother      No Known Problems Brother      Cancer Other Multiple Family Members        SHx:  Social History     Tobacco Use    Smoking status: Former     Current packs/day: 0.00     Types: Cigarettes     Quit date:      Years since quittin.8     Passive exposure: Past    Smokeless tobacco: Never   Vaping Use    Vaping status: Never Used   Substance Use Topics    Alcohol use: Yes     Alcohol/week: 1.0 standard drink of alcohol     Types: 1 Cans of beer per week    Drug use: Never       Allergies:  No Known  Allergies      Assessment/Plan  Successful TOV, discussed postop expectations, precautions. Follow up in 2 weeks for PVR, symptom check      Scribe Attestation  By signing my name below, I, Margoth Reid, Scribe, attest that this documentation  has been prepared under the direction and in the presence of Ravin Navarro MD.

## 2024-11-11 NOTE — ANESTHESIA PROCEDURE NOTES
Airway  Date/Time: 11/11/2024 9:42 AM  Urgency: elective    Airway not difficult    Staffing  Performed: ARELIS   Authorized by: Basim Johnston MD    Performed by: JAYME Penny-WANG  Patient location during procedure: OR    Indications and Patient Condition  Indications for airway management: anesthesia  Spontaneous Ventilation: absent  Sedation level: deep  Preoxygenated: yes  Patient position: sniffing  MILS not maintained throughout  Mask difficulty assessment: 0 - not attempted  Planned trial extubation    Final Airway Details  Final airway type: supraglottic airway      Successful airway: Supraglottic airway: igel.  Size 5     Number of attempts at approach: 1  Number of other approaches attempted: 0

## 2024-11-11 NOTE — OP NOTE
Holmium Laser Enucleation Transurethral Prostate, Cystoscopy; Botox Injection ~ 100 Units Operative Note     Date: 2024  OR Location: [facility]    Name: Ravin Camacho, : 1955, Age: 69 y.o., MRN: [id number], Sex: male    Diagnosis  Pre-op Diagnosis      * Enlarged prostate with urinary obstruction [N40.1, N13.8]     * OAB (overactive bladder) [N32.81] Post-op Diagnosis     * Enlarged prostate with urinary obstruction [N40.1, N13.8]     * OAB (overactive bladder) [N32.81]     Procedures  Holmium Laser Enucleation Transurethral Prostate  64743 - [place] LASER ENUCLEATION PROSTATE W/MORCELLATION    Cystoscopy; Botox Injection ~ 100 Units  07834 - [place] CYSTOURETHROSCOPY INJ CHEMODENERVATION BLADDER      Surgeons      * Ravin Navarro - Primary    Resident/Fellow/Other Assistant:  Surgeons and Role:  * No surgeons found with a matching role *    Staff:   Circulator: Beverly  Scrub Person: Stefani    Anesthesia Staff: Anesthesiologist: Basim Johnston MD  CRNA: JAYME Penny-WANG  SRNA: Lali Mcclellan    Procedure Summary  Anesthesia: Anesthesia type not filed in the log.  ASA: II  Estimated Blood Loss: 10mL  Intra-op Medications:   Administrations occurring from 0930 to 1130 on 24:   Medication Name Total Dose   sodium chloride 0.9 % irrigation solution 9,000 mL   onabotulinumtoxinA (Botox) injection 100 Units   sterile water irrigation solution 1,000 mL   ceFAZolin (Ancef) vial 1 g 2 g   dexAMETHasone (Decadron) injection 4 mg/mL 4 mg   fentaNYL (Sublimaze) injection 50 mcg/mL 100 mcg   LR bolus Cannot be calculated   lidocaine (Xylocaine) injection 2 % 100 mg   midazolam PF (Versed) injection 1 mg/mL 2 mg   ondansetron (Zofran) 2 mg/mL injection 4 mg   propofol (Diprivan) injection 10 mg/mL 200 mg              Anesthesia Record               Intraprocedure I/O Totals          Intake    LR bolus 100.00 mL    Total Intake 100 mL       Output    Est. Blood Loss 10 mL    Total Output 10 mL       Net     Net Volume 90 mL          Specimen:   ID Type Source Tests Collected by Time   1 : Prostate Chips Tissue PROSTATE HOLEP SURGICAL PATHOLOGY EXAM Ravin Navarro MD 11/11/2024 0957                 Drains and/or Catheters:   Urethral Catheter Non-latex 22 Fr. (Active)       Tourniquet Times:         Implants:     Findings: small, trilobar obstruction. L UO c/w prior resection. No bladder tumor recurrence. 100U botox    Indications: Ravin Camacho is an 69 y.o. male who is having surgery for Enlarged prostate with urinary obstruction [N40.1, N13.8]  OAB (overactive bladder) [N32.81].     The patient was seen in the preoperative area. The risks, benefits, complications, treatment options, non-operative alternatives, expected recovery and outcomes were discussed with the patient. The possibilities of reaction to medication, pulmonary aspiration, injury to surrounding structures, bleeding, recurrent infection, the need for additional procedures, failure to diagnose a condition, and creating a complication requiring transfusion or operation were discussed with the patient. The patient concurred with the proposed plan, giving informed consent.  The site of surgery was properly noted/marked if necessary per policy. The patient has been actively warmed in preoperative area. Preoperative antibiotics have been ordered and given within 1 hours of incision. Venous thrombosis prophylaxis have been ordered including bilateral sequential compression devices    Procedure Details:   1. HOLMIUM LASER ENUCLEATION OF THE PROSTATE   Laser Settings Used:  Cutting 2 J, 40 Hz.  Coagulation 1.5 J, 30 Hz.   Julio or Virtual basket used? Julio  Preoperative Prostate Size:  approx 50 cubic centimeters.     Prostate configuration: trilobar  Complication: none  EBL: 10 ml  Catheter: 22Fr 3 way  Antibiotics: ancef  Specimen: Morcellated prostatic tissue.    DESCRIPTION OF PROCEDURE:      The patient was brought tto he OR and after good  general anesthesia was achieved, the patient was prepped and draped in dorsal lithotomy position. All pressure points were padded.  Surgical pause was performed.  The patient was identified using 2 identifiers.  The correct surgical procedure was confirmed.  All members of surgical and anesthesia team were in agreement.  The patient received prophylactic antibiotic and the procedure started.    Cystoscopy: The patient's bladder was first entered with a 22-Scottish rigid cystoscope with 30-degree lens.  Cystoscopic examination showed normal anterior urethra.  The posterior urethra showed trilobar obstruction with a high median lobe.  Both ureteral orifices were identified away from the bladder neck. The cystoscope was removed and the meatus was dilated up to 28-Scottish using sounds.  The urethra was then filled with lidocaine gel and a 26-Scottish Guo continuous-flow resectoscope was placed.      Using an adjustable botox injection needle set to 3mm, 100U of botox was injected intradetrusor in 1mL alliquots on the posterior bladder wall. The needle was then removed.    The holmium laser apparatus was placed through the sheath.  Using a 550-micron end-firing quartz laser fiber, a laser bridge, and a 7-Scottish laser stabilizing catheter, the procedure was started with the above-mentioned laser setting.    A en bloc technique was performed with an initial incision at the apex and circumferentially using low energy.  We continued to develop our anterior plane at the apex, identifying the capsule and freeing up the anterior apex well within the sphincter. We then proceeded with our posterior dissection, developing the posterior space toward the bladder neck and continuing our incision laterally to 9 and 3:00.    Then we turned our attention to the lateral attachments of the prostate.  Using laser energy, taking care to avoid any damage to the sphincter, we connected our previously dissected anterior and posterior planes to  completely liberate the apex of the prostate preserving the sphincter. We then continued our dissection circumferentially, freeing the prostate systematically from apex to its attachments at the bladder neck. The adenoma was then pushed into the bladder.     Once the prostate had been fully enucleated, the laser was defocused on any sources of bleeding to get additional hemostasis.  There was good hemostasis at the conclusion of this procedure.  A few small remaining nodular adenomas were then resected from the capsule.      The laser bridge apparatus and the resectoscope were then removed and the offset Guo nephroscope and Guo - [place] tissue morcellator were then introduced and used to completely morcellate the tissue.  Two inflows were used during this portion of the procedure to fully distend the bladder and to prevent any inadvertent bladder injury.  The bladder was then ellik'd out after insertion of the inner sheath and reinspected with the cystoscope showing no residual adenomas.  Hemostasis was ensured at the conclusion of the procedure and both ureteral orifices were confirmed and identified well away from the area of resection.  No residual adenoma could be identified.     Following that, a three-way Robertson catheter on a guide was placed into the bladder and the balloon was filled.  The bladder was irrigated near clear and the continuous irrigation was started.     The patient tolerated the procedure well and was shifted to recovery in good general condition   Complications:  None; patient tolerated the procedure well.    Disposition: PACU - hemodynamically stable.  Condition: stable                 Additional Details:       Ravin Navarro  Phone Number: [phone]

## 2024-11-11 NOTE — ANESTHESIA PREPROCEDURE EVALUATION
Patient: Ravin Camacho    Procedure Information       Date/Time: 11/11/24 2194    Procedures:       Holmium Laser Enucleation Transurethral Prostate (Prostate)      Cystoscopy; Botox Injection ~ 100 Units (Bladder)    Location: [facility] A OR 01 / [facility] A OR    Surgeons: Ravin Navarro MD            Relevant Problems   Cardiac   (+) Benign essential hypertension   (+) Hyperlipidemia      Pulmonary   (+) COPD (chronic obstructive pulmonary disease) (Multi)   (+) Malignant neoplasm of lung (Multi)      Neuro   (+) Cervical radiculitis   (+) Peripheral neuropathy      /Renal   (+) Benign prostatic hyperplasia with urinary frequency   (+) Calculus of kidney   (+) Enlarged prostate with urinary obstruction      Endocrine   (+) Class 1 obesity with body mass index (BMI) of 30.0 to 30.9 in adult   (+) Diabetes mellitus type 2 without retinopathy (Multi)   (+) Obesity      Hematology   (+) Erythrocytosis      Musculoskeletal   (+) Primary localized osteoarthrosis, lower leg   (+) Spinal stenosis      HEENT   (+) Asymmetrical hearing loss of left ear   (+) Hearing loss, mixed, unilateral       Clinical information reviewed:    Allergies                 Past Medical History:   Diagnosis Date   • Acute otitis media 04/05/2023   • Bladder cancer (Multi)     S/P CHEMO COMPLETE   • BPH (benign prostatic hyperplasia)    • Cervical disc disease     CERVICAL RADICULITIS   • Cluster headache 04/05/2023   • COPD (chronic obstructive pulmonary disease) (Multi)     HOME O2 3LNC AT REST, 4LNC WITH EXERTION, DR. Echeverria TASKED FOR CLEARANCE   • Diastolic dysfunction     DR. 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/05/2023   • Lumbar disc disease     SPINAL STENOSIS   • Lung cancer (Multi)     REOCCURANCE 4/2021, S/P LOBECTOMY 2015   • On home O2    • Personal history of irradiation  2022    History of radiation therapy   • Pure hypercholesterolemia, unspecified     High cholesterol   • Type 2 diabetes mellitus     A1C 5.9 23   • Vitamin D deficiency    • Wears dentures     LOWER      Past Surgical History:   Procedure Laterality Date   • BLADDER SURGERY      TURBT 2023   • COLONOSCOPY  2016    Complete Colonoscopy   • HAND SURGERY  2016    Hand Surgery                                                                                                                                                         • KIDNEY STONE SURGERY      May 2022   • KNEE ARTHROPLASTY      right knee   • LUNG LOBECTOMY  2015    Lung Lobectomy   • OTHER SURGICAL HISTORY  10/31/2019    Knee replacement   • OTHER SURGICAL HISTORY  2022    Epidural steroid injection   • OTHER SURGICAL HISTORY  2022    Bronchoscopy     Social History     Tobacco Use   • Smoking status: Former     Current packs/day: 0.00     Types: Cigarettes     Quit date:      Years since quittin.8     Passive exposure: Past   • Smokeless tobacco: Never   Vaping Use   • Vaping status: Never Used   Substance Use Topics   • Alcohol use: Yes     Alcohol/week: 1.0 standard drink of alcohol     Types: 1 Cans of beer per week   • Drug use: Never      Current Outpatient Medications   Medication Instructions   • albuterol 90 mcg/actuation inhaler 2 puffs, Every 4 hours PRN   • albuterol 2.5 mg, nebulization, Every 6 hours PRN   • atorvastatin (LIPITOR) 20 mg, oral, Daily   • blood sugar diagnostic (Blood Glucose Test) strip 1 strip, Daily   • dietary supplement capsule 3 capsules   • dietary supplement capsule 1 capsule, Daily   • dietary supplement capsule 1 capsule, Daily   • ergocalciferol (VITAMIN D-2) 1,250 mcg, oral, Weekly, SATURDAY   • lancets (Fingerstix Lancets) misc Daily   • losartan (Cozaar) 25 mg tablet TAKE 1 TABLET BY MOUTH EVERY DAY   • meloxicam (MOBIC) 15 mg, Daily PRN   • metFORMIN XR  "(GLUCOPHAGE-XR) 1,000 mg, oral, 2 times daily   • multivitamin with minerals (multivitamin with folic acid) tablet 1 tablet, Daily   • tadalafil (CIALIS) 5 mg, oral, Daily   • tadalafil 20 mg tablet Take 1 tablet by mouth once daily as needed for erectile dysfunction. Take one hour before activity   • Trelegy Ellipta 100-62.5-25 mcg blister with device 1 puff, inhalation, Daily, Rinse mouth with water after use to reduce aftertaste and incidence of candidiasis. Do not swallow.      No Known Allergies     Chemistry    Lab Results   Component Value Date/Time     10/28/2024 1143    K 5.2 10/28/2024 1143    CL 98 10/28/2024 1143    CO2 36 (H) 10/28/2024 1143    BUN 20 10/28/2024 1143    CREATININE 0.91 10/28/2024 1143    Lab Results   Component Value Date/Time    CALCIUM 9.9 10/28/2024 1143    AST 16 09/06/2022 1003    ALT 18 09/06/2022 1003          Lab Results   Component Value Date    HGBA1C 5.9 (A) 08/11/2023     Lab Results   Component Value Date/Time    WBC 7.7 10/28/2024 1143    HGB 16.7 10/28/2024 1143    HCT 51.6 10/28/2024 1143     10/28/2024 1143     Lab Results   Component Value Date/Time    PROTIME 11.0 05/31/2022 1431    INR 1.0 05/31/2022 1431     No results found for: \"ABORH\"  Encounter Date: 10/10/24   ECG 12 lead   Result Value    Ventricular Rate 72    Atrial Rate 72    DE Interval 120    QRS Duration 86    QT Interval 340    QTC Calculation(Bazett) 372    P Axis 64    R Axis 51    T Axis 40    QRS Count 12    Q Onset 221    P Onset 161    P Offset 210    T Offset 391    QTC Fredericia 361    Narrative    Normal sinus rhythm  Anterior infarct (cited on or before 20-MAY-2022)  Abnormal ECG  When compared with ECG of 20-FEB-2023 15:09,  Questionable change in initial forces of Anterior leads  Confirmed by Jon Rice ([id number]) on 10/18/2024 11:52:36 AM     No results found for this or any previous visit from the past 1095 days.       Visit Vitals  /79   Pulse 74   Temp 37.1 °C (98.8 " °F) (Temporal)   Resp 24   Wt 108 kg (238 lb 8.6 oz)   SpO2 95%   BMI 34.23 kg/m²   Smoking Status Former   BSA 2.31 m²     NPO/Void Status  Carbohydrate Drink Given Prior to Surgery? : N  Date of Last Liquid: 11/11/24  Time of Last Liquid: 0600  Date of Last Solid: 11/10/24  Time of Last Solid: 1900  Last Intake Type: Clear fluids  Time of Last Void: 0700        Physical Exam    Airway  Mallampati: II  TM distance: >3 FB  Neck ROM: full     Cardiovascular - normal exam     Dental - normal exam     Pulmonary - normal exam     Abdominal - normal exam           Anesthesia Plan    History of general anesthesia?: yes  History of complications of general anesthesia?: no    ASA 2     general     The patient is not a current smoker.    intravenous induction   Postoperative administration of opioids is intended.  Anesthetic plan and risks discussed with patient.  Use of blood products discussed with patient who.    Plan discussed with CRNA and attending.

## 2024-11-13 ENCOUNTER — OFFICE VISIT (OUTPATIENT)
Dept: UROLOGY | Facility: HOSPITAL | Age: 69
End: 2024-11-13
Payer: MEDICARE

## 2024-11-13 DIAGNOSIS — R35.0 BENIGN PROSTATIC HYPERPLASIA WITH URINARY FREQUENCY: Primary | ICD-10-CM

## 2024-11-13 DIAGNOSIS — N40.1 BENIGN PROSTATIC HYPERPLASIA WITH URINARY FREQUENCY: Primary | ICD-10-CM

## 2024-11-13 DIAGNOSIS — R39.12 WEAK URINARY STREAM: ICD-10-CM

## 2024-11-13 PROCEDURE — 4010F ACE/ARB THERAPY RXD/TAKEN: CPT | Performed by: UROLOGY

## 2024-11-13 PROCEDURE — 99211 OFF/OP EST MAY X REQ PHY/QHP: CPT | Performed by: UROLOGY

## 2024-11-15 ENCOUNTER — APPOINTMENT (OUTPATIENT)
Dept: UROLOGY | Facility: HOSPITAL | Age: 69
End: 2024-11-15
Payer: MEDICARE

## 2024-11-15 LAB
LABORATORY COMMENT REPORT: NORMAL
PATH REPORT.FINAL DX SPEC: NORMAL
PATH REPORT.GROSS SPEC: NORMAL
PATH REPORT.RELEVANT HX SPEC: NORMAL
PATH REPORT.TOTAL CANCER: NORMAL

## 2024-11-19 DIAGNOSIS — M17.12 UNILATERAL PRIMARY OSTEOARTHRITIS, LEFT KNEE: ICD-10-CM

## 2024-11-19 RX ORDER — MELOXICAM 15 MG/1
15 TABLET ORAL DAILY PRN
Qty: 90 TABLET | Refills: 1 | Status: SHIPPED | OUTPATIENT
Start: 2024-11-19

## 2024-11-21 ENCOUNTER — APPOINTMENT (OUTPATIENT)
Dept: PRIMARY CARE | Facility: CLINIC | Age: 69
End: 2024-11-21
Payer: MEDICARE

## 2024-11-21 VITALS
WEIGHT: 235 LBS | OXYGEN SATURATION: 96 % | HEIGHT: 70 IN | RESPIRATION RATE: 22 BRPM | SYSTOLIC BLOOD PRESSURE: 120 MMHG | BODY MASS INDEX: 33.64 KG/M2 | HEART RATE: 66 BPM | DIASTOLIC BLOOD PRESSURE: 76 MMHG | TEMPERATURE: 97.8 F

## 2024-11-21 DIAGNOSIS — M19.91 PRIMARY LOCALIZED OSTEOARTHRITIS: ICD-10-CM

## 2024-11-21 DIAGNOSIS — E66.811 CLASS 1 OBESITY DUE TO EXCESS CALORIES WITHOUT SERIOUS COMORBIDITY WITH BODY MASS INDEX (BMI) OF 34.0 TO 34.9 IN ADULT: ICD-10-CM

## 2024-11-21 DIAGNOSIS — N40.1 ENLARGED PROSTATE WITH URINARY OBSTRUCTION: ICD-10-CM

## 2024-11-21 DIAGNOSIS — I10 BENIGN ESSENTIAL HYPERTENSION: ICD-10-CM

## 2024-11-21 DIAGNOSIS — H90.8 MIXED CONDUCTIVE AND SENSORINEURAL HEARING LOSS, UNSPECIFIED LATERALITY: ICD-10-CM

## 2024-11-21 DIAGNOSIS — J42 CHRONIC BRONCHITIS, UNSPECIFIED CHRONIC BRONCHITIS TYPE (MULTI): ICD-10-CM

## 2024-11-21 DIAGNOSIS — C67.4 MALIGNANT NEOPLASM OF POSTERIOR WALL OF URINARY BLADDER (MULTI): ICD-10-CM

## 2024-11-21 DIAGNOSIS — R79.89 DECREASED TESTOSTERONE LEVEL: ICD-10-CM

## 2024-11-21 DIAGNOSIS — Z00.00 HEALTHCARE MAINTENANCE: Primary | ICD-10-CM

## 2024-11-21 DIAGNOSIS — Z86.0100 HISTORY OF COLONIC POLYPS: ICD-10-CM

## 2024-11-21 DIAGNOSIS — E78.6 LOW HDL (UNDER 40): ICD-10-CM

## 2024-11-21 DIAGNOSIS — N13.8 ENLARGED PROSTATE WITH URINARY OBSTRUCTION: ICD-10-CM

## 2024-11-21 DIAGNOSIS — E66.09 CLASS 1 OBESITY DUE TO EXCESS CALORIES WITHOUT SERIOUS COMORBIDITY WITH BODY MASS INDEX (BMI) OF 34.0 TO 34.9 IN ADULT: ICD-10-CM

## 2024-11-21 DIAGNOSIS — N32.81 OAB (OVERACTIVE BLADDER): ICD-10-CM

## 2024-11-21 DIAGNOSIS — C34.90 MALIGNANT NEOPLASM OF LUNG, UNSPECIFIED LATERALITY, UNSPECIFIED PART OF LUNG (MULTI): ICD-10-CM

## 2024-11-21 DIAGNOSIS — R53.83 OTHER FATIGUE: ICD-10-CM

## 2024-11-21 DIAGNOSIS — G62.89 OTHER POLYNEUROPATHY: ICD-10-CM

## 2024-11-21 DIAGNOSIS — E78.5 HYPERLIPIDEMIA, UNSPECIFIED HYPERLIPIDEMIA TYPE: ICD-10-CM

## 2024-11-21 DIAGNOSIS — E11.9 DIABETES MELLITUS TYPE 2 WITHOUT RETINOPATHY (MULTI): ICD-10-CM

## 2024-11-21 PROCEDURE — 1158F ADVNC CARE PLAN TLK DOCD: CPT | Performed by: INTERNAL MEDICINE

## 2024-11-21 PROCEDURE — 1123F ACP DISCUSS/DSCN MKR DOCD: CPT | Performed by: INTERNAL MEDICINE

## 2024-11-21 PROCEDURE — 99214 OFFICE O/P EST MOD 30 MIN: CPT | Performed by: INTERNAL MEDICINE

## 2024-11-21 PROCEDURE — 4010F ACE/ARB THERAPY RXD/TAKEN: CPT | Performed by: INTERNAL MEDICINE

## 2024-11-21 PROCEDURE — 99397 PER PM REEVAL EST PAT 65+ YR: CPT | Performed by: INTERNAL MEDICINE

## 2024-11-21 PROCEDURE — G0439 PPPS, SUBSEQ VISIT: HCPCS | Performed by: INTERNAL MEDICINE

## 2024-11-21 PROCEDURE — 1036F TOBACCO NON-USER: CPT | Performed by: INTERNAL MEDICINE

## 2024-11-21 PROCEDURE — 1160F RVW MEDS BY RX/DR IN RCRD: CPT | Performed by: INTERNAL MEDICINE

## 2024-11-21 PROCEDURE — 3008F BODY MASS INDEX DOCD: CPT | Performed by: INTERNAL MEDICINE

## 2024-11-21 PROCEDURE — 3078F DIAST BP <80 MM HG: CPT | Performed by: INTERNAL MEDICINE

## 2024-11-21 PROCEDURE — 3074F SYST BP LT 130 MM HG: CPT | Performed by: INTERNAL MEDICINE

## 2024-11-21 PROCEDURE — 1159F MED LIST DOCD IN RCRD: CPT | Performed by: INTERNAL MEDICINE

## 2024-11-21 PROCEDURE — 1170F FXNL STATUS ASSESSED: CPT | Performed by: INTERNAL MEDICINE

## 2024-11-21 ASSESSMENT — ENCOUNTER SYMPTOMS
DIARRHEA: 0
WEAKNESS: 0
RESPIRATORY NEGATIVE: 1
DIZZINESS: 0
POLYPHAGIA: 0
ARTHRALGIAS: 1
VOMITING: 0
DECREASED CONCENTRATION: 0
LIGHT-HEADEDNESS: 0
POLYDIPSIA: 0
COUGH: 0
NERVOUS/ANXIOUS: 0
SPEECH DIFFICULTY: 0
FEVER: 0
VOICE CHANGE: 0
ENDOCRINE NEGATIVE: 1
CONFUSION: 0
HEMATURIA: 0
UNEXPECTED WEIGHT CHANGE: 0
FREQUENCY: 0
SLEEP DISTURBANCE: 0
BRUISES/BLEEDS EASILY: 0
APNEA: 0
HEADACHES: 0
WOUND: 0
ABDOMINAL PAIN: 0
BACK PAIN: 1
DYSURIA: 0
TREMORS: 0
PALPITATIONS: 0
NAUSEA: 0
CONSTIPATION: 0
FATIGUE: 0
EYES NEGATIVE: 1
SORE THROAT: 0
NUMBNESS: 0
SHORTNESS OF BREATH: 0
DYSPHORIC MOOD: 0
TROUBLE SWALLOWING: 0

## 2024-11-21 NOTE — PATIENT INSTRUCTIONS
Overall doing well considering recent medical problems  No clinical evidence of acute heart disease ,  echocardiogram for left ventricular function satisfactory dyspnea on exertion  Cardiac risk counseling provided  Age and gender appropriate cancer screening prevention reviewed, emphasis on prior lung cancer and urologic cancer  Colonoscopy 2019  Immunizations reviewed  DJD the knee, spine with lumbar spinal stenosis  Diabetes satisfactory, check hemoglobin A1c  Peripheral neuropathy likely secondary to diabetes and/or lumbar spinal stenosis  COPD pulmonary rehab

## 2024-11-21 NOTE — PROGRESS NOTES
Subjective   Reason for Visit: Ravin Camacho is an 69 y.o. male here for a Medicare Wellness visit.     Past Medical, Surgical, and Family History reviewed and updated in chart.    Reviewed all medications by prescribing practitioner or clinical pharmacist (such as prescriptions, OTCs, herbal therapies and supplements) and documented in the medical record.    HPI  Overall no acute complaints  Chronic issues stable on current care plan    Patient Care Team:  Jose Antonio Prado MD as PCP - General  Jose Antonio Prado MD as PCP - [employer] PCP       Review of Systems   Constitutional:  Negative for fatigue, fever and unexpected weight change.        Weight 235 pounds, sleep okay, oxygen therapy nasal cannula   HENT:  Positive for dental problem and hearing loss. Negative for sore throat, tinnitus, trouble swallowing and voice change.         Hearing aids, regular dental care/dentures   Eyes: Negative.  Negative for visual disturbance.        Exam annual, eyeglasses   Respiratory: Negative.  Negative for apnea, cough and shortness of breath.         Short of breath 1 flight of stairs  O2  3 LPM   Cardiovascular:  Negative for chest pain, palpitations and leg swelling.   Gastrointestinal:  Negative for abdominal pain, constipation, diarrhea, nausea and vomiting.   Endocrine: Negative.  Negative for polydipsia, polyphagia and polyuria.   Genitourinary: Negative.  Negative for dysuria, frequency, hematuria and urgency.        Tadalafil okay   Musculoskeletal:  Positive for arthralgias and back pain. Negative for gait problem.        Knee pain, back pain, right hip pain, had right knee cortisone injection   Skin: Negative.  Negative for rash and wound.        Dermatology consult over 1 year   Allergic/Immunologic: Negative for immunocompromised state.   Neurological:  Negative for dizziness, tremors, speech difficulty, weakness, light-headedness, numbness and headaches.        Cluster headaches resolved  "  Hematological:  Does not bruise/bleed easily.   Psychiatric/Behavioral:  Negative for confusion, decreased concentration, dysphoric mood and sleep disturbance. The patient is not nervous/anxious.      Spirits good  Exercise limited   Falls -  LW UTD     Fhx  B-no change     Objective   Vitals:  /76   Pulse 66   Temp 36.6 °C (97.8 °F) (Oral)   Resp 22   Ht 1.778 m (5' 10\")   Wt 107 kg (235 lb)   SpO2 96%   BMI 33.72 kg/m²       Physical Exam  Constitutional:       General: He is not in acute distress.     Appearance: Normal appearance. He is obese.      Comments: Oxygen by nasal cannula   HENT:      Head: Normocephalic.      Right Ear: Hearing and tympanic membrane normal.      Left Ear: Hearing and tympanic membrane normal.      Ears:      Comments: Hearing aids speech and finger rub     Nose: Nose normal.      Mouth/Throat:      Mouth: Mucous membranes are moist.      Pharynx: Oropharynx is clear.      Comments: Lower dentures  Eyes:      General: No scleral icterus.     Extraocular Movements: Extraocular movements intact.      Conjunctiva/sclera: Conjunctivae normal.   Neck:      Thyroid: No thyromegaly.      Vascular: No carotid bruit or JVD.      Comments: No JVD or accessory muscle respiration use  Cardiovascular:      Rate and Rhythm: Normal rate and regular rhythm.      Pulses: Normal pulses.      Heart sounds: Normal heart sounds. No murmur heard.  Pulmonary:      Effort: Pulmonary effort is normal.      Breath sounds: Normal breath sounds. No wheezing, rhonchi or rales.   Chest:      Comments: Right posterior thoracic surgical scar  Abdominal:      General: Abdomen is flat. Bowel sounds are normal. There is no distension.      Palpations: Abdomen is soft. There is no mass.      Tenderness: There is no abdominal tenderness. There is no right CVA tenderness, left CVA tenderness or guarding.      Hernia: No hernia is present.   Genitourinary:     Comments: /rectal not " examined  Musculoskeletal:         General: Normal range of motion.      Cervical back: Full passive range of motion without pain and normal range of motion. No tenderness.      Right lower leg: No edema.      Left lower leg: No edema.      Comments: Joints F ROM, T0 L0 S0  surgical scar right knee  Decreased range of motion lumbar spine and right hip   Lymphadenopathy:      Cervical: No cervical adenopathy.   Skin:     General: Skin is warm.      Capillary Refill: Capillary refill takes less than 2 seconds.      Findings: No lesion or rash.      Comments: Multiple nevi   Neurological:      General: No focal deficit present.      Mental Status: He is alert and oriented to person, place, and time.      Cranial Nerves: No cranial nerve deficit.      Sensory: Sensory deficit present.      Motor: No weakness.      Coordination: Coordination normal.      Gait: Gait normal.      Deep Tendon Reflexes: Reflexes normal.      Comments: Decreased vibration and filament sense plantar feet   Psychiatric:         Mood and Affect: Mood normal.         Behavior: Behavior normal.         Thought Content: Thought content normal.       Data  ECG-10/10/24, 2/20 normal sinus rhythm  Prostate biopsy 11/11/2024 hyperplasia  Blood type O+  Ultrasound kidneys 4/18/24 7/17/2023  CT chest 8/16/2024 6/5/2023  PET/CT 6/21/2023  Chest x-ray 8/1/2024  CT abdomen pelvis 5/14/2024  X-ray hip 7/20/2022-mild DJD  Lab 1/11/2024 glucose 147, creatinine 0.9/GFR greater than 90  10/20/8/24 CBC normal  8/11/2023 reviewed normal lipid, A1c, PSA  7/7 hemoglobin/hematocrit 18.7/60  9/6/2022 hemoglobin A1c 5.7%  Echocardiogram 8/9/2024 the EF 65% mild enlarged RV; normal pulmonary artery pressure  Coronary artery calcium CT score 2016  Cardiac stress test December 2015  Colonoscopy 2019  Urology consult/7/11  Pulmonary consult 10/10/2024 7/6  Pulmonary function test 8/1/2024   radiation oncology consult 6/22  Pain management consult 6/26  Ophthalmology  consult 4/12  MRI lumbar spine 8/4/2022    Assessment/Plan     Overall doing well considering recent medical problems  No clinical evidence of acute heart disease ,  echocardiogram for left ventricular function satisfactory dyspnea on exertion  Cardiac risk counseling provided  Age and gender appropriate cancer screening prevention reviewed, emphasis on prior lung cancer and urologic cancer  Colonoscopy 2019  Immunizations reviewed  DJD the knee, spine with lumbar spinal stenosis  Diabetes satisfactory, check hemoglobin A1c  Peripheral neuropathy likely secondary to diabetes and/or lumbar spinal stenosis  COPD pulmonary rehab     Problem List Items Addressed This Visit       Benign essential hypertension    Malignant neoplasm of urinary bladder (Multi)    Chronic obstructive pulmonary disease (Multi)    Diabetes mellitus type 2 without retinopathy (Multi)    Relevant Orders    Hemoglobin A1C (Completed)    TSH with reflex to Free T4 if abnormal (Completed)    Lipid Panel (Completed)    Fatigue    Relevant Orders    Testosterone (Completed)    Mixed conductive and sensorineural hearing loss    Hyperlipidemia    Low HDL (under 40)    Primary localized osteoarthritis    Peripheral nerve disease    Obesity    Malignant neoplasm of lung (Multi)    Decreased testosterone level    History of colonic polyps    Healthcare maintenance - Primary    OAB (overactive bladder)    Enlarged prostate with urinary obstruction

## 2024-11-22 NOTE — PROGRESS NOTES
HPI    69 y.o. male being seen with the following problem list:    Problem list:  T1 papillary urothelial carcinoma. Surveillance by Dr. Frausto   Non obstructing bilateral nephrolithiasis without hydronephrosis. Managed by Dr. Ambriz  Bothersome LUTS     - Renal US on 4/18/24 demonstrated no bladder mass. Non obstructing bilateral nephrolithiasis without hydronephrosis. 1.4cm stone in the left kidney, will continue to follow with Dr. Ambriz for treatment on this.      5/14/24 CT abdomen/pelvis  1. Multiple bilateral nonobstructing renal calculi as detailed above.  No ureteral stones or hydroureteronephrosis identified.  2. No evidence of metastatic disease identified in the abdomen or pelvis.  3. Prostatomegaly. Correlate with serum PSA     3/29/24 microUA - neg blood in urine  3/29/24 Ucx - neg     5/3/24 microUA - neg blood in urine  5/3/24 Ucx- neg     5/31/24 microUA - neg blood in urine  5/31/24 Ucx - neg     10/09/24 - PVR 100cc. Bothersome LUTs like Weak stream, urgency and frequency. Has worsening COPD, is on oxygen therapy.       11/11/24 S/p HoLEP + Botox 100U. Benign 10.5g     11/13/24 - Presents for TOV. Passed TOV    11/27/24 - PVR 19cc. Urinary symptoms somewhat improved, not a whole lot better. Still with nocturia x2-3, mild day time frequency, not bothersome. Emptying bladder well, no leakage. Stream is mostly clear.         Lab Results   Component Value Date    PSA 3.64 08/11/2023              Current Medications:  Current Outpatient Medications   Medication Sig Dispense Refill    albuterol 2.5 mg /3 mL (0.083 %) nebulizer solution TAKE 3 ML (2.5 MG) BY NEBULIZATION EVERY 6 HOURS IF NEEDED FOR WHEEZING OR SHORTNESS OF BREATH. 75 mL 5    albuterol 90 mcg/actuation inhaler INHALE 2 PUFFS BY MOUTH EVERY 4 TO 6 HOURS AS NEEDED 18 g 11    atorvastatin (Lipitor) 20 mg tablet TAKE 1 TABLET BY MOUTH EVERY DAY 90 tablet 3    blood sugar diagnostic (Blood Glucose Test) strip 1 strip by in vitro route once  daily.      dietary supplement capsule Take 3 capsules by mouth. SPIRULINA      dietary supplement capsule Take 1 capsule by mouth once daily. KRE-ALKALYN      dietary supplement capsule Take 1 capsule by mouth once daily. PROBIOTICS      ergocalciferol (Vitamin D-2) 1.25 MG (43560 UT) capsule Take 1 capsule (1,250 mcg) by mouth 1 (one) time per week. SATURDAY 12 capsule 2    lancets (Fingerstix Lancets) misc once daily.      losartan (Cozaar) 25 mg tablet TAKE 1 TABLET BY MOUTH EVERY  tablet 2    meloxicam (Mobic) 15 mg tablet TAKE 1 TABLET EVERY DAY AS NEEDED 90 tablet 1    metFORMIN  mg 24 hr tablet TAKE 2 TABLETS BY MOUTH TWICE A  tablet 3    multivitamin with minerals (multivitamin with folic acid) tablet Take 1 tablet by mouth once daily.      tadalafil 20 mg tablet Take 1 tablet by mouth once daily as needed for erectile dysfunction. Take one hour before activity 30 tablet 0    Trelegy Ellipta 100-62.5-25 mcg blister with device INHALE 1 PUFF ONCE DAILY. RINSE MOUTH WITH WATER AFTER USE TO REDUCE AFTERTASTE AND INCIDENCE OF CANDIDIASIS. DO NOT SWALLOW. 60 each 5     No current facility-administered medications for this visit.        Active Problems:  Ravin Camacho is a 69 y.o. male with the following Problems and Medications.  Patient Active Problem List   Diagnosis    Abnormal finding on MRI of brain    Asymmetrical hearing loss of left ear    Benign essential hypertension    Bladder cancer (Multi)    Bladder mass    Calculus of kidney    Cervical radiculitis    Conjunctival concretions of right eye    COPD (chronic obstructive pulmonary disease) (Multi)    Diabetes mellitus type 2 without retinopathy (Multi)    Difficulty walking    Dysfunction of both eustachian tubes    Dyspnea    Epididymitis    Erectile dysfunction    Erythrocytosis    Fatigue    Hearing loss, mixed, unilateral    History of lung cancer    Hyperlipidemia    Hyperopia of both eyes with astigmatism and  presbyopia    Hyperopia with astigmatism and presbyopia    Low HDL (under 40)    Vitamin D deficiency    Status post cystoscopy with ureteral stent placement    Primary localized osteoarthrosis, lower leg    Peripheral neuropathy    Paresthesia    Obesity    Nuclear sclerosis of both eyes    Multiple nevi    Mass of finger of left hand    Malignant neoplasm of lung (Multi)    Low testosterone    Spinal stenosis    Lump in the testicle    Lung nodule    Microscopic hematuria    Personal history of colonic polyps    Weak urinary stream    Melanocytic nevi of trunk    Neoplasm of unspecified behavior of bone, soft tissue, and skin    Other specified congenital malformations of skin    Chest discomfort    Class 1 obesity with body mass index (BMI) of 30.0 to 30.9 in adult    Healthcare maintenance    Benign prostatic hyperplasia with urinary frequency    OAB (overactive bladder)    Enlarged prostate with urinary obstruction     Current Outpatient Medications   Medication Sig Dispense Refill    albuterol 2.5 mg /3 mL (0.083 %) nebulizer solution TAKE 3 ML (2.5 MG) BY NEBULIZATION EVERY 6 HOURS IF NEEDED FOR WHEEZING OR SHORTNESS OF BREATH. 75 mL 5    albuterol 90 mcg/actuation inhaler INHALE 2 PUFFS BY MOUTH EVERY 4 TO 6 HOURS AS NEEDED 18 g 11    atorvastatin (Lipitor) 20 mg tablet TAKE 1 TABLET BY MOUTH EVERY DAY 90 tablet 3    blood sugar diagnostic (Blood Glucose Test) strip 1 strip by in vitro route once daily.      dietary supplement capsule Take 3 capsules by mouth. SPIRULINA      dietary supplement capsule Take 1 capsule by mouth once daily. KRE-ALKALYN      dietary supplement capsule Take 1 capsule by mouth once daily. PROBIOTICS      ergocalciferol (Vitamin D-2) 1.25 MG (42420 UT) capsule Take 1 capsule (1,250 mcg) by mouth 1 (one) time per week. SATURDAY 12 capsule 2    lancets (Fingerstix Lancets) misc once daily.      losartan (Cozaar) 25 mg tablet TAKE 1 TABLET BY MOUTH EVERY  tablet 2    meloxicam  (Mobic) 15 mg tablet TAKE 1 TABLET EVERY DAY AS NEEDED 90 tablet 1    metFORMIN  mg 24 hr tablet TAKE 2 TABLETS BY MOUTH TWICE A  tablet 3    multivitamin with minerals (multivitamin with folic acid) tablet Take 1 tablet by mouth once daily.      tadalafil 20 mg tablet Take 1 tablet by mouth once daily as needed for erectile dysfunction. Take one hour before activity 30 tablet 0    Trelegy Ellipta 100-62.5-25 mcg blister with device INHALE 1 PUFF ONCE DAILY. RINSE MOUTH WITH WATER AFTER USE TO REDUCE AFTERTASTE AND INCIDENCE OF CANDIDIASIS. DO NOT SWALLOW. 60 each 5     No current facility-administered medications for this visit.       PMH:  Past Medical History:   Diagnosis Date    Acute otitis media 04/05/2023    Bladder cancer (Multi)     S/P CHEMO COMPLETE    BPH (benign prostatic hyperplasia)     Cervical disc disease     CERVICAL RADICULITIS    Cluster headache 04/05/2023    COPD (chronic obstructive pulmonary disease) (Multi)     HOME O2 3LNC AT REST, 4LNC WITH EXERTION, DR. Echeverria TASKED FOR CLEARANCE    Diastolic dysfunction     DR. NIDA TRIPLETT APPT 12/4/24    Hip pain, right 04/05/2023    HL (hearing loss)     Hypertension     Kidney stone     1.4CM LEFT KIDNEY    Lattice degeneration of both retinas 04/05/2023    Lumbar disc disease     SPINAL STENOSIS    Lung cancer (Multi)     REOCCURANCE 4/2021, S/P LOBECTOMY 2015    On home O2     Personal history of irradiation 09/18/2022    History of radiation therapy    Pure hypercholesterolemia, unspecified     High cholesterol    Type 2 diabetes mellitus     A1C 5.9 8/11/23    Vitamin D deficiency     Wears dentures     LOWER       PSH:  Past Surgical History:   Procedure Laterality Date    BLADDER SURGERY      TURBT MARCH 2023    COLONOSCOPY  12/01/2016    Complete Colonoscopy    HAND SURGERY  12/01/2016    Hand Surgery                                                                                                                                                           KIDNEY STONE SURGERY      May 2022    KNEE ARTHROPLASTY      right knee    LUNG LOBECTOMY  2015    Lung Lobectomy    OTHER SURGICAL HISTORY  10/31/2019    Knee replacement    OTHER SURGICAL HISTORY  2022    Epidural steroid injection    OTHER SURGICAL HISTORY  2022    Bronchoscopy       FMH:  Family History   Problem Relation Name Age of Onset    Hypertension Mother      Lung cancer Father      No Known Problems Sister      No Known Problems Sister      No Known Problems Brother      No Known Problems Brother      Cancer Other Multiple Family Members        SHx:  Social History     Tobacco Use    Smoking status: Former     Current packs/day: 0.00     Types: Cigarettes     Quit date:      Years since quittin.9     Passive exposure: Past    Smokeless tobacco: Never   Vaping Use    Vaping status: Never Used   Substance Use Topics    Alcohol use: Yes     Alcohol/week: 1.0 standard drink of alcohol     Types: 1 Cans of beer per week    Drug use: Never       Allergies:  No Known Allergies    Assessment/Plan  About 2 weeks s/p HoLEP + Botox 100U. Urinary symptoms modestly improved. Emptying bladder well, no leakage. Stream is mostly clear. Discussed allowing more time to heal.      Follow up in 6 weeks over     [employer] Att[employer]  By signing my name below, I, Paola Ma, attest that this documentation has been prepared under the direction and in the presence of Ravin Navarro MD.

## 2024-11-25 ENCOUNTER — LAB (OUTPATIENT)
Dept: LAB | Facility: LAB | Age: 69
End: 2024-11-25
Payer: MEDICARE

## 2024-11-25 DIAGNOSIS — R53.83 OTHER FATIGUE: ICD-10-CM

## 2024-11-25 DIAGNOSIS — E11.9 DIABETES MELLITUS TYPE 2 WITHOUT RETINOPATHY (MULTI): ICD-10-CM

## 2024-11-25 LAB
CHOLEST SERPL-MCNC: 142 MG/DL (ref 0–199)
CHOLESTEROL/HDL RATIO: 2.9
EST. AVERAGE GLUCOSE BLD GHB EST-MCNC: 134 MG/DL
HBA1C MFR BLD: 6.3 %
HDLC SERPL-MCNC: 49 MG/DL
LDLC SERPL CALC-MCNC: 67 MG/DL
NON HDL CHOLESTEROL: 93 MG/DL (ref 0–149)
TESTOST SERPL-MCNC: 220 NG/DL (ref 240–1000)
TRIGL SERPL-MCNC: 130 MG/DL (ref 0–149)
TSH SERPL-ACNC: 1.69 MIU/L (ref 0.44–3.98)
VLDL: 26 MG/DL (ref 0–40)

## 2024-11-25 PROCEDURE — 80061 LIPID PANEL: CPT

## 2024-11-25 PROCEDURE — 36415 COLL VENOUS BLD VENIPUNCTURE: CPT

## 2024-11-25 PROCEDURE — 84403 ASSAY OF TOTAL TESTOSTERONE: CPT

## 2024-11-25 PROCEDURE — 84443 ASSAY THYROID STIM HORMONE: CPT

## 2024-11-25 PROCEDURE — 83036 HEMOGLOBIN GLYCOSYLATED A1C: CPT

## 2024-11-27 ENCOUNTER — OFFICE VISIT (OUTPATIENT)
Dept: UROLOGY | Facility: HOSPITAL | Age: 69
End: 2024-11-27
Payer: MEDICARE

## 2024-11-27 DIAGNOSIS — R35.1 NOCTURIA: Primary | ICD-10-CM

## 2024-11-27 DIAGNOSIS — R35.0 BENIGN PROSTATIC HYPERPLASIA WITH URINARY FREQUENCY: ICD-10-CM

## 2024-11-27 DIAGNOSIS — N40.1 BENIGN PROSTATIC HYPERPLASIA WITH URINARY FREQUENCY: ICD-10-CM

## 2024-11-27 PROCEDURE — 99211 OFF/OP EST MAY X REQ PHY/QHP: CPT | Performed by: UROLOGY

## 2024-11-27 PROCEDURE — 3044F HG A1C LEVEL LT 7.0%: CPT | Performed by: UROLOGY

## 2024-11-27 PROCEDURE — 4010F ACE/ARB THERAPY RXD/TAKEN: CPT | Performed by: UROLOGY

## 2024-11-27 PROCEDURE — 3048F LDL-C <100 MG/DL: CPT | Performed by: UROLOGY

## 2024-11-27 PROCEDURE — 1159F MED LIST DOCD IN RCRD: CPT | Performed by: UROLOGY

## 2024-11-27 PROCEDURE — 99024 POSTOP FOLLOW-UP VISIT: CPT | Performed by: UROLOGY

## 2024-11-27 PROCEDURE — 1036F TOBACCO NON-USER: CPT | Performed by: UROLOGY

## 2024-11-27 ASSESSMENT — PATIENT HEALTH QUESTIONNAIRE - PHQ9
1. LITTLE INTEREST OR PLEASURE IN DOING THINGS: NOT AT ALL
SUM OF ALL RESPONSES TO PHQ9 QUESTIONS 1 AND 2: 0
2. FEELING DOWN, DEPRESSED OR HOPELESS: NOT AT ALL

## 2024-11-30 DIAGNOSIS — I10 ESSENTIAL (PRIMARY) HYPERTENSION: ICD-10-CM

## 2024-12-02 ENCOUNTER — TELEPHONE (OUTPATIENT)
Dept: CARDIAC REHAB | Facility: CLINIC | Age: 69
End: 2024-12-02
Payer: MEDICARE

## 2024-12-03 RX ORDER — LOSARTAN POTASSIUM 25 MG/1
TABLET ORAL
Qty: 100 TABLET | Refills: 2 | Status: SHIPPED | OUTPATIENT
Start: 2024-12-03

## 2024-12-03 NOTE — PROGRESS NOTES
Primary Care Physician: Jose Antonio Prado MD  Date of Visit: 12/04/2024 11:00 AM EST      Chief Complaint:     Pt referred by Suly Maria for dyspnea and abnormal echocardiogram     HPI / Summary:    Ravin Camacho is a 69 y.o. male former smoker with history of COPD, lung cancer with right upper lobectomy 1999, recurrent adenocarcinoma 2021 with radiation therapy referred for progressive dyspnea and concern for abnormal echocardiogram.    He reports having bladder cancer and underwent chemotherapy spring of this year.  Says after his 11th chemo session in May is when his dyspnea began to get worse.  He reported additional chills, fatigue diminished appetite and shortness of breath.  Initially put on 2 L and then it pulmonary visit in October of this year was 86% on 3 L.  PFTs with moderate obstruction.  CT chest without contrast 8/20/2024 showing persistent soft tissue prominence in the left suprahilar region consistent with posttreatment/radiation changes.  Emphysema.  No pleural effusions.  Minimal coronary calcifications (I personally reviewed images)      I reviewed his echocardiogram 8/9/2024 in which she has normal LVEF.  There was some mention of mild diastolic dysfunction, however his E/a ratio was normal at 0.9 as well as E/e` is normal at 7.14.  No evidence of diastolic dysfunction.  At most his RV is mildly enlarged but actually visually looks normal to me.    Last Cardiology Tests:  ECG: 10/10/24 Normal sinus rhythm  Anterior infarct (cited on or before 20-MAY-2022)  Abnormal ECG    Echo: 8/9/24  1. Left ventricular ejection fraction is normal, calculated by Person's biplane at 65%.   2. Spectral Doppler shows an impaired relaxation pattern of left ventricular diastolic filling.   3. There is normal right ventricular global systolic function.   4. Mildly enlarged right ventricle.    Cath:      Stress Test:    Cardiac Imaging:      Past Medical History:  Past Medical History:   Diagnosis Date     Acute otitis media 04/05/2023    Bladder cancer (Multi)     S/P CHEMO COMPLETE    BPH (benign prostatic hyperplasia)     Cervical disc disease     CERVICAL RADICULITIS    Cluster headache 04/05/2023    COPD (chronic obstructive pulmonary disease) (Multi)     HOME O2 3LNC AT REST, 4LNC WITH EXERTION, DR. Echeverria TASKED FOR CLEARANCE    Diastolic dysfunction     DR. NIDA TRIPLETT APPT 12/4/24    Hip pain, right 04/05/2023    HL (hearing loss)     Hypertension     Kidney stone     1.4CM LEFT KIDNEY    Lattice degeneration of both retinas 04/05/2023    Lumbar disc disease     SPINAL STENOSIS    Lung cancer (Multi)     REOCCURANCE 4/2021, S/P LOBECTOMY 2015    On home O2     Personal history of irradiation 09/18/2022    History of radiation therapy    Pure hypercholesterolemia, unspecified     High cholesterol    Type 2 diabetes mellitus     A1C 5.9 8/11/23    Vitamin D deficiency     Wears dentures     LOWER        Past Surgical History:  Past Surgical History:   Procedure Laterality Date    BLADDER SURGERY      TURBT MARCH 2023    COLONOSCOPY  12/01/2016    Complete Colonoscopy    HAND SURGERY  12/01/2016    Hand Surgery                                                                                                                                                          KIDNEY STONE SURGERY      May 2022    KNEE ARTHROPLASTY      right knee    LUNG LOBECTOMY  05/22/2015    Lung Lobectomy    OTHER SURGICAL HISTORY  10/31/2019    Knee replacement    OTHER SURGICAL HISTORY  09/18/2022    Epidural steroid injection    OTHER SURGICAL HISTORY  09/18/2022    Bronchoscopy          Social History:  He reports that he quit smoking about 25 years ago. His smoking use included cigarettes. He has been exposed to tobacco smoke. He has never used smokeless tobacco. He reports current alcohol use of about 1.0 standard drink of alcohol per week. He reports that he does not use drugs.    Family History:  family history includes  Cancer in an other family member; Hypertension in his mother; Lung cancer in his father; No Known Problems in his brother, brother, sister, and sister.      Allergies:  No Known Allergies    Outpatient Medications:  Current Outpatient Medications   Medication Instructions    albuterol 90 mcg/actuation inhaler 2 puffs, Every 4 hours PRN    albuterol 2.5 mg, nebulization, Every 6 hours PRN    atorvastatin (LIPITOR) 20 mg, oral, Daily    blood sugar diagnostic (Blood Glucose Test) strip 1 strip, Daily    dietary supplement capsule 3 capsules    dietary supplement capsule 1 capsule, Daily    dietary supplement capsule 1 capsule, Daily    ergocalciferol (VITAMIN D-2) 1,250 mcg, oral, Weekly, SATURDAY    lancets (Fingerstix Lancets) misc Daily    losartan (Cozaar) 25 mg tablet TAKE 1 TABLET BY MOUTH EVERY DAY    meloxicam (MOBIC) 15 mg, oral, Daily PRN    metFORMIN XR (GLUCOPHAGE-XR) 1,000 mg, oral, 2 times daily    multivitamin with minerals (multivitamin with folic acid) tablet 1 tablet, Daily    tadalafil 20 mg tablet Take 1 tablet by mouth once daily as needed for erectile dysfunction. Take one hour before activity    Trelegy Ellipta 100-62.5-25 mcg blister with device 1 puff, inhalation, Daily, Rinse mouth with water after use to reduce aftertaste and incidence of candidiasis. Do not swallow.       Review of Systems:  A complete 10 point ROS was performed and is negative except for HPI    Physical Exam:  GENERAL: alert, cooperative, pleasant, in no acute distress  SKIN: warm, dry, no rash.  NECK: no JVD, no LUIS M  CARDIAC: Regular rate and rhythm with no rubs, murmurs, or gallops  CHEST: Normal respiratory efforts, lungs clear to auscultation bilaterally.  ABDOMEN: soft, nontender, nondistended  EXTREMITIES: no edema  NEURO: Alert and oriented x 3.  Grossly normal.  Moves all 4 extremities.    Vitals:    12/04/24 1104   BP: 123/70   BP Location: Left arm   Patient Position: Sitting   Pulse: 83   SpO2: 93%   Weight: 108  "kg (239 lb)     Wt Readings from Last 5 Encounters:   11/21/24 107 kg (235 lb)   11/11/24 108 kg (238 lb 8.6 oz)   10/28/24 105 kg (232 lb 5.8 oz)   10/10/24 106 kg (234 lb)   08/01/24 104 kg (230 lb)     There is no height or weight on file to calculate BMI.        Last Labs:  CMP:  Recent Labs     10/28/24  1143 05/31/24  1421 05/03/24  1328    142 143   K 5.2 4.8 4.9   CL 98 102 104   CO2 36* 34* 26   ANIONGAP 12 11 18   BUN 20 16 16   CREATININE 0.91 1.10 1.12   EGFR >90 73 71   GLUCOSE 147* 109* 120*     Recent Labs     05/31/24  1421 05/03/24  1328 03/29/24  1351 07/07/23  0939 09/06/22  1003 04/27/22  1130 10/28/21  1056   ALBUMIN 4.5 4.5 4.2   < > 4.3   < > 4.3   ALT  --   --   --   --  18  --  17   AST  --   --   --   --  16  --  17    < > = values in this interval not displayed.     CBC:  Recent Labs     10/28/24  1143 05/31/24  1421 05/03/24  1328   WBC 7.7 8.1 7.3   HGB 16.7 19.8* 20.2*   HCT 51.6 59.1* 59.8*    225 197   MCV 98 102* 99     COAG:   Recent Labs     05/31/22  1431 05/20/22  0750   INR 1.0 1.0     ENDO:  Recent Labs     11/25/24  0842 08/11/23  0723 09/06/22  1003 10/28/21  1056 07/21/20  1053 05/12/20  1100   TSH 1.69  --  2.23 1.46  --  1.29   HGBA1C 6.3* 5.9* 5.7* 5.7*   < > 12.9    < > = values in this interval not displayed.      CARDIAC: No results for input(s): \"CKMB\", \"TROPHS\", \"BNP\" in the last 64713 hours.    No lab exists for component: \"CK\", \"CKMBP\"  Recent Labs     11/25/24  0842 08/11/23  0723 09/06/22  1003 10/28/21  1056   CHOL 142 130 134 125   LDLF  --  59 60 57   LDLCALC 67  --   --   --    HDL 49.0 47.7 41.8 39.5*   TRIG 130 116 160* 141               Assessment/Plan   69 y.o. male former smoker with history of COPD, lung cancer with right upper lobectomy 1999, recurrent adenocarcinoma 2021 with radiation therapy, bladder cancer s/p chemotherapy, referred for progressive dyspnea and concern for abnormal echocardiogram.  He has had progressive dyspnea now " requiring increasing oxygen since chemotherapy for his bladder cancer was completed earlier this year.  I do not see any cardiac etiology for his shortness of breath to this point.  On review of his echocardiogram he has normal diastolic function and at most mild RV dilation.  CT chest also reviewed from August of this year showing no pleural effusions or pericardial effusion.  On exam he does not appear volume overloaded and low probability for any heart failure. If he had stage I diastolic dysfunction as the echocardiogram claims his symptoms are way out of proportion to what I would expect from mild diastolic dysfunction.  With recent cancer and progressive dyspnea, PE needs to be ruled out.  I have ordered a D-dimer if this is elevated he will need a stat CT scan with contrast.  I have also added BNP.  Suspicion for coronary artery disease is also on the low side as he has minimal coronary calcifications on his noncontrast chest CT.  Agree with pulmonary rehab but he does not think he can do much activity.  Continue close follow-up with pulmonary.      Followup Appts:  Future Appointments   Date Time Provider Department Center   12/4/2024 11:00 AM Poncho Hayes DO [id number] [facility]   1/22/2025 11:20 AM Ravin Navarro MD [facility]   1/29/2025 11:30 AM Jose Antonio Prado MD [id number] [facility]   2/20/2025 10:30 AM [facility]016 CT 1 [id number] [facility]   2/25/2025  1:00 PM Ethel Oneal, APRN-CNP [id number] [facility]   4/29/2025 10:45 AM Jose Antonio Chamberlain OD [id number] [facility]   5/20/2025 11:00 AM [facility]016 ULTRASOUND 1 [id number] [facility]   5/30/2025 11:10 AM Luiza Ambriz MD [id number] [facility]   11/11/2025  1:00 PM Jose Antonio Prado MD [id number] [facility]           ____________________________________________________________  Poncho Hayes DO  [facility]  [facility]

## 2024-12-04 ENCOUNTER — OFFICE VISIT (OUTPATIENT)
Dept: CARDIOLOGY | Facility: CLINIC | Age: 69
End: 2024-12-04
Payer: MEDICARE

## 2024-12-04 ENCOUNTER — LAB (OUTPATIENT)
Dept: LAB | Facility: LAB | Age: 69
End: 2024-12-04
Payer: MEDICARE

## 2024-12-04 VITALS
BODY MASS INDEX: 34.29 KG/M2 | HEART RATE: 83 BPM | WEIGHT: 239 LBS | OXYGEN SATURATION: 93 % | DIASTOLIC BLOOD PRESSURE: 70 MMHG | SYSTOLIC BLOOD PRESSURE: 123 MMHG

## 2024-12-04 DIAGNOSIS — R06.00 DYSPNEA, UNSPECIFIED TYPE: ICD-10-CM

## 2024-12-04 DIAGNOSIS — J44.9 CHRONIC OBSTRUCTIVE PULMONARY DISEASE, UNSPECIFIED COPD TYPE (MULTI): ICD-10-CM

## 2024-12-04 DIAGNOSIS — J96.11 CHRONIC RESPIRATORY FAILURE WITH HYPOXIA (MULTI): Primary | ICD-10-CM

## 2024-12-04 LAB
ANION GAP SERPL CALC-SCNC: 13 MMOL/L (ref 10–20)
BNP SERPL-MCNC: 32 PG/ML (ref 0–99)
BUN SERPL-MCNC: 19 MG/DL (ref 6–23)
CALCIUM SERPL-MCNC: 9.8 MG/DL (ref 8.6–10.6)
CHLORIDE SERPL-SCNC: 99 MMOL/L (ref 98–107)
CO2 SERPL-SCNC: 36 MMOL/L (ref 21–32)
CREAT SERPL-MCNC: 0.95 MG/DL (ref 0.5–1.3)
D DIMER PPP FEU-MCNC: 247 NG/ML FEU
EGFRCR SERPLBLD CKD-EPI 2021: 87 ML/MIN/1.73M*2
GLUCOSE SERPL-MCNC: 163 MG/DL (ref 74–99)
POTASSIUM SERPL-SCNC: 4.8 MMOL/L (ref 3.5–5.3)
SODIUM SERPL-SCNC: 143 MMOL/L (ref 136–145)

## 2024-12-04 PROCEDURE — 36415 COLL VENOUS BLD VENIPUNCTURE: CPT

## 2024-12-04 PROCEDURE — 99204 OFFICE O/P NEW MOD 45 MIN: CPT | Performed by: INTERNAL MEDICINE

## 2024-12-04 PROCEDURE — 1036F TOBACCO NON-USER: CPT | Performed by: INTERNAL MEDICINE

## 2024-12-04 PROCEDURE — 83880 ASSAY OF NATRIURETIC PEPTIDE: CPT

## 2024-12-04 PROCEDURE — 4010F ACE/ARB THERAPY RXD/TAKEN: CPT | Performed by: INTERNAL MEDICINE

## 2024-12-04 PROCEDURE — 3048F LDL-C <100 MG/DL: CPT | Performed by: INTERNAL MEDICINE

## 2024-12-04 PROCEDURE — 99214 OFFICE O/P EST MOD 30 MIN: CPT | Performed by: INTERNAL MEDICINE

## 2024-12-04 PROCEDURE — 80048 BASIC METABOLIC PNL TOTAL CA: CPT

## 2024-12-04 PROCEDURE — 3074F SYST BP LT 130 MM HG: CPT | Performed by: INTERNAL MEDICINE

## 2024-12-04 PROCEDURE — 3044F HG A1C LEVEL LT 7.0%: CPT | Performed by: INTERNAL MEDICINE

## 2024-12-04 PROCEDURE — 3078F DIAST BP <80 MM HG: CPT | Performed by: INTERNAL MEDICINE

## 2024-12-04 PROCEDURE — 85379 FIBRIN DEGRADATION QUANT: CPT

## 2024-12-04 PROCEDURE — 1159F MED LIST DOCD IN RCRD: CPT | Performed by: INTERNAL MEDICINE

## 2024-12-09 ENCOUNTER — TELEPHONE (OUTPATIENT)
Dept: CARDIOLOGY | Facility: CLINIC | Age: 69
End: 2024-12-09
Payer: MEDICARE

## 2024-12-09 DIAGNOSIS — J44.9 CHRONIC OBSTRUCTIVE PULMONARY DISEASE, UNSPECIFIED COPD TYPE (MULTI): ICD-10-CM

## 2024-12-09 RX ORDER — ALBUTEROL SULFATE 0.83 MG/ML
2.5 SOLUTION RESPIRATORY (INHALATION) EVERY 6 HOURS PRN
Qty: 75 ML | Refills: 5 | Status: SHIPPED | OUTPATIENT
Start: 2024-12-09 | End: 2025-12-09

## 2024-12-09 NOTE — TELEPHONE ENCOUNTER
TCT patient and results reviewed. States understanding but then call disconnected. Will send My Chart message with recommendation to follow up with pulmonary

## 2024-12-09 NOTE — TELEPHONE ENCOUNTER
----- Message from Poncho Hayes sent at 12/4/2024  5:32 PM EST -----  Normal BNP and normal D-dimer.  Reassuring.  Suggest he follow-up with pulmonary for his continued shortness of breath.

## 2024-12-28 DIAGNOSIS — J44.9 CHRONIC OBSTRUCTIVE PULMONARY DISEASE, UNSPECIFIED COPD TYPE (MULTI): ICD-10-CM

## 2025-01-02 ENCOUNTER — TELEPHONE (OUTPATIENT)
Dept: PRIMARY CARE | Facility: CLINIC | Age: 70
End: 2025-01-02

## 2025-01-02 DIAGNOSIS — J44.9 CHRONIC OBSTRUCTIVE PULMONARY DISEASE, UNSPECIFIED COPD TYPE (MULTI): ICD-10-CM

## 2025-01-02 RX ORDER — FLUTICASONE FUROATE, UMECLIDINIUM BROMIDE AND VILANTEROL TRIFENATATE 100; 62.5; 25 UG/1; UG/1; UG/1
1 POWDER RESPIRATORY (INHALATION) DAILY
Qty: 60 EACH | Refills: 5 | Status: SHIPPED | OUTPATIENT
Start: 2025-01-02

## 2025-01-02 RX ORDER — PREDNISONE 20 MG/1
40 TABLET ORAL DAILY
Qty: 10 TABLET | Refills: 0 | Status: SHIPPED | OUTPATIENT
Start: 2025-01-02 | End: 2025-01-07

## 2025-01-02 RX ORDER — AMOXICILLIN AND CLAVULANATE POTASSIUM 875; 125 MG/1; MG/1
1 TABLET, FILM COATED ORAL 2 TIMES DAILY
Qty: 20 TABLET | Refills: 0 | Status: SHIPPED | OUTPATIENT
Start: 2025-01-02 | End: 2025-01-12

## 2025-01-02 NOTE — TELEPHONE ENCOUNTER
Sent the following message to Suly Maria CNP:  This patient is complaining of a soar throat, productive cough with green sputum, CHAPA, green sinus drainage, occasional wheezing, and body aches. Patient denies fever and chills. He is requesting an antibiotic and prednisone.   Informed the patient that per Suly, prescriptions for Augmentin and Prednisone have been sent to his pharmacy.  Patient expressed understanding and appreciation for the call.

## 2025-01-02 NOTE — TELEPHONE ENCOUNTER
Pt called and stated pt would like an antibiotic which is called z kenyetta or something of that nature

## 2025-01-03 ENCOUNTER — PATIENT MESSAGE (OUTPATIENT)
Dept: PRIMARY CARE | Facility: CLINIC | Age: 70
End: 2025-01-03

## 2025-01-03 DIAGNOSIS — J40 BRONCHITIS: Primary | ICD-10-CM

## 2025-01-03 RX ORDER — AZITHROMYCIN 500 MG/1
500 TABLET, FILM COATED ORAL DAILY
Qty: 3 TABLET | Refills: 0 | Status: SHIPPED | OUTPATIENT
Start: 2025-01-03 | End: 2025-01-06

## 2025-01-20 NOTE — PROGRESS NOTES
HPI    69 y.o. male being seen with the following problem list:    Problem list:  T1 papillary urothelial carcinoma. Surveillance by Dr. Frausto   Non obstructing bilateral nephrolithiasis without hydronephrosis. Managed by Dr. Ambriz  Bothersome LUTS - S/p HoLEP + Botox 100U. Benign 10.5g 11/11/24     - Renal US on 4/18/24 demonstrated no bladder mass. Non obstructing bilateral nephrolithiasis without hydronephrosis. 1.4cm stone in the left kidney, will continue to follow with Dr. Ambriz for treatment on this.      5/14/24 CT abdomen/pelvis  1. Multiple bilateral nonobstructing renal calculi as detailed above.  No ureteral stones or hydroureteronephrosis identified.  2. No evidence of metastatic disease identified in the abdomen or pelvis.  3. Prostatomegaly. Correlate with serum PSA     3/29/24 microUA - neg blood in urine  3/29/24 Ucx - neg     5/3/24 microUA - neg blood in urine  5/3/24 Ucx- neg     5/31/24 microUA - neg blood in urine  5/31/24 Ucx - neg     10/09/24 - PVR 100cc. Bothersome LUTs like Weak stream, urgency and frequency. Has worsening COPD, is on oxygen therapy.       11/13/24 - Presents for TOV. Passed TOV     11/27/24 - PVR 19cc. Urinary symptoms somewhat improved, not a whole lot better. Still with nocturia x2-3, mild day time frequency, not bothersome. Emptying bladder well, no leakage. Stream is mostly clear.      01/22/25 - Seen today over telehealth, performed this visit using real-time telehealth tools, including an audio/video connection between Ravin Camacho at home and Ravin Navarro MD at [facility]. Consent for telemedicine visit was obtained.   Urinary symptoms improved. He is able to sleep through the night mostly, nocturia 1x. Stream is somewhat better, minimal frequency. Erections work well on Cialis 20mg. He will be seeing Dr. Frausto for his bladder ca.       Lab Results   Component Value Date    PSA 3.64 08/11/2023              Current Medications:  Current  Outpatient Medications   Medication Sig Dispense Refill    albuterol 2.5 mg /3 mL (0.083 %) nebulizer solution TAKE 3 ML (2.5 MG) BY NEBULIZATION EVERY 6 HOURS IF NEEDED FOR WHEEZING OR SHORTNESS OF BREATH. 75 mL 5    albuterol 90 mcg/actuation inhaler INHALE 2 PUFFS BY MOUTH EVERY 4 TO 6 HOURS AS NEEDED 18 g 11    atorvastatin (Lipitor) 20 mg tablet TAKE 1 TABLET BY MOUTH EVERY DAY 90 tablet 3    blood sugar diagnostic (Blood Glucose Test) strip 1 strip by in vitro route once daily.      dietary supplement capsule Take 3 capsules by mouth. SPIRULINA      dietary supplement capsule Take 1 capsule by mouth once daily. KRE-ALKALYN      dietary supplement capsule Take 1 capsule by mouth once daily. PROBIOTICS      ergocalciferol (Vitamin D-2) 1.25 MG (65285 UT) capsule Take 1 capsule (1,250 mcg) by mouth 1 (one) time per week. SATURDAY 12 capsule 2    lancets (Fingerstix Lancets) misc once daily.      losartan (Cozaar) 25 mg tablet TAKE 1 TABLET BY MOUTH EVERY  tablet 2    meloxicam (Mobic) 15 mg tablet TAKE 1 TABLET EVERY DAY AS NEEDED 90 tablet 1    metFORMIN  mg 24 hr tablet TAKE 2 TABLETS BY MOUTH TWICE A  tablet 3    multivitamin with minerals (multivitamin with folic acid) tablet Take 1 tablet by mouth once daily.      oxygen (O2) gas therapy Inhale 1 each continuously.      tadalafil 20 mg tablet Take 1 tablet (20 mg) by mouth once daily as needed for erectile dysfunction. 30 tablet 3    Trelegy Ellipta 100-62.5-25 mcg blister with device INHALE 1 PUFF ONCE DAILY. RINSE MOUTH WITH WATER AFTER USE TO REDUCE AFTERTASTE AND INCIDENCE OF CANDIDIASIS. DO NOT SWALLOW. 60 each 5     No current facility-administered medications for this visit.        Active Problems:  Ravin Camacho is a 69 y.o. male with the following Problems and Medications.  Patient Active Problem List   Diagnosis    Abnormal finding on MRI of brain    Asymmetrical hearing loss of left ear    Benign essential hypertension     Bladder cancer (Multi)    Bladder mass    Calculus of kidney    Cervical radiculitis    Conjunctival concretions of right eye    COPD (chronic obstructive pulmonary disease) (Multi)    Diabetes mellitus type 2 without retinopathy (Multi)    Difficulty walking    Dysfunction of both eustachian tubes    Dyspnea    Epididymitis    Erectile dysfunction    Erythrocytosis    Fatigue    Hearing loss, mixed, unilateral    History of lung cancer    Hyperlipidemia    Hyperopia of both eyes with astigmatism and presbyopia    Hyperopia with astigmatism and presbyopia    Low HDL (under 40)    Vitamin D deficiency    Status post cystoscopy with ureteral stent placement    Primary localized osteoarthrosis, lower leg    Peripheral neuropathy    Paresthesia    Obesity    Nuclear sclerosis of both eyes    Multiple nevi    Mass of finger of left hand    Malignant neoplasm of lung (Multi)    Low testosterone    Spinal stenosis    Lump in the testicle    Lung nodule    Microscopic hematuria    Personal history of colonic polyps    Weak urinary stream    Melanocytic nevi of trunk    Neoplasm of unspecified behavior of bone, soft tissue, and skin    Other specified congenital malformations of skin    Chest discomfort    Class 1 obesity with body mass index (BMI) of 30.0 to 30.9 in adult    Healthcare maintenance    Benign prostatic hyperplasia with urinary frequency    OAB (overactive bladder)    Enlarged prostate with urinary obstruction    Nocturia     Current Outpatient Medications   Medication Sig Dispense Refill    albuterol 2.5 mg /3 mL (0.083 %) nebulizer solution TAKE 3 ML (2.5 MG) BY NEBULIZATION EVERY 6 HOURS IF NEEDED FOR WHEEZING OR SHORTNESS OF BREATH. 75 mL 5    albuterol 90 mcg/actuation inhaler INHALE 2 PUFFS BY MOUTH EVERY 4 TO 6 HOURS AS NEEDED 18 g 11    atorvastatin (Lipitor) 20 mg tablet TAKE 1 TABLET BY MOUTH EVERY DAY 90 tablet 3    blood sugar diagnostic (Blood Glucose Test) strip 1 strip by in vitro route once  daily.      dietary supplement capsule Take 3 capsules by mouth. SPIRULINA      dietary supplement capsule Take 1 capsule by mouth once daily. KRE-ALKALYN      dietary supplement capsule Take 1 capsule by mouth once daily. PROBIOTICS      ergocalciferol (Vitamin D-2) 1.25 MG (14273 UT) capsule Take 1 capsule (1,250 mcg) by mouth 1 (one) time per week. SATURDAY 12 capsule 2    lancets (Fingerstix Lancets) misc once daily.      losartan (Cozaar) 25 mg tablet TAKE 1 TABLET BY MOUTH EVERY  tablet 2    meloxicam (Mobic) 15 mg tablet TAKE 1 TABLET EVERY DAY AS NEEDED 90 tablet 1    metFORMIN  mg 24 hr tablet TAKE 2 TABLETS BY MOUTH TWICE A  tablet 3    multivitamin with minerals (multivitamin with folic acid) tablet Take 1 tablet by mouth once daily.      oxygen (O2) gas therapy Inhale 1 each continuously.      tadalafil 20 mg tablet Take 1 tablet (20 mg) by mouth once daily as needed for erectile dysfunction. 30 tablet 3    Trelegy Ellipta 100-62.5-25 mcg blister with device INHALE 1 PUFF ONCE DAILY. RINSE MOUTH WITH WATER AFTER USE TO REDUCE AFTERTASTE AND INCIDENCE OF CANDIDIASIS. DO NOT SWALLOW. 60 each 5     No current facility-administered medications for this visit.       PMH:  Past Medical History:   Diagnosis Date    Acute otitis media 04/05/2023    Bladder cancer (Multi)     S/P CHEMO COMPLETE    BPH (benign prostatic hyperplasia)     Cervical disc disease     CERVICAL RADICULITIS    Cluster headache 04/05/2023    COPD (chronic obstructive pulmonary disease) (Multi)     HOME O2 3LNC AT REST, 4LNC WITH EXERTION, DR. Echeverria TASKED FOR CLEARANCE    Diastolic dysfunction     DR. NIDA TRIPLETT APPT 12/4/24    Hip pain, right 04/05/2023    HL (hearing loss)     Hypertension     Kidney stone     1.4CM LEFT KIDNEY    Lattice degeneration of both retinas 04/05/2023    Lumbar disc disease     SPINAL STENOSIS    Lung cancer (Multi)     REOCCURANCE 4/2021, S/P LOBECTOMY 2015    On home O2     Personal  history of irradiation 2022    History of radiation therapy    Pure hypercholesterolemia, unspecified     High cholesterol    Type 2 diabetes mellitus     A1C 5.9 23    Vitamin D deficiency     Wears dentures     LOWER       PSH:  Past Surgical History:   Procedure Laterality Date    BLADDER SURGERY      TURBT 2023    COLONOSCOPY  2016    Complete Colonoscopy    HAND SURGERY  2016    Hand Surgery                                                                                                                                                          KIDNEY STONE SURGERY      May 2022    KNEE ARTHROPLASTY      right knee    LUNG LOBECTOMY  2015    Lung Lobectomy    OTHER SURGICAL HISTORY  10/31/2019    Knee replacement    OTHER SURGICAL HISTORY  2022    Epidural steroid injection    OTHER SURGICAL HISTORY  2022    Bronchoscopy       FMH:  Family History   Problem Relation Name Age of Onset    Hypertension Mother      Lung cancer Father      No Known Problems Sister      No Known Problems Sister      No Known Problems Brother      No Known Problems Brother      Cancer Other Multiple Family Members        SHx:  Social History     Tobacco Use    Smoking status: Former     Current packs/day: 0.00     Types: Cigarettes     Quit date:      Years since quittin.0     Passive exposure: Past    Smokeless tobacco: Never   Vaping Use    Vaping status: Never Used   Substance Use Topics    Alcohol use: Yes     Alcohol/week: 1.0 standard drink of alcohol     Types: 1 Cans of beer per week    Drug use: Never       Allergies:  No Known Allergies      Assessment/Plan  Urinary symptoms improved, stream is stronger, nocturia down to 1x, minimal frequency. Overall doing well. Erections working well on Cialis 20 mg, rx refilled.     He will follow up with Dr. Frausto for his bladder ca.     Scribe Attestation  By signing my name below, I, Paola Ma, attest that this  documentation has been prepared under the direction and in the presence of Ravin Navarro MD.

## 2025-01-22 ENCOUNTER — APPOINTMENT (OUTPATIENT)
Dept: UROLOGY | Facility: HOSPITAL | Age: 70
End: 2025-01-22
Payer: MEDICARE

## 2025-01-22 DIAGNOSIS — R35.0 BENIGN PROSTATIC HYPERPLASIA WITH URINARY FREQUENCY: ICD-10-CM

## 2025-01-22 DIAGNOSIS — N40.1 BENIGN PROSTATIC HYPERPLASIA WITH URINARY FREQUENCY: ICD-10-CM

## 2025-01-22 DIAGNOSIS — R35.1 NOCTURIA: Primary | ICD-10-CM

## 2025-01-22 DIAGNOSIS — N52.9 ERECTILE DYSFUNCTION, UNSPECIFIED ERECTILE DYSFUNCTION TYPE: ICD-10-CM

## 2025-01-22 PROCEDURE — G2211 COMPLEX E/M VISIT ADD ON: HCPCS | Performed by: UROLOGY

## 2025-01-22 PROCEDURE — 99214 OFFICE O/P EST MOD 30 MIN: CPT | Performed by: UROLOGY

## 2025-01-22 PROCEDURE — 4010F ACE/ARB THERAPY RXD/TAKEN: CPT | Performed by: UROLOGY

## 2025-01-22 RX ORDER — TADALAFIL 20 MG/1
20 TABLET ORAL DAILY PRN
Qty: 30 TABLET | Refills: 3 | Status: SHIPPED | OUTPATIENT
Start: 2025-01-22

## 2025-01-29 ENCOUNTER — APPOINTMENT (OUTPATIENT)
Dept: PRIMARY CARE | Facility: CLINIC | Age: 70
End: 2025-01-29
Payer: MEDICARE

## 2025-01-29 VITALS — OXYGEN SATURATION: 94 % | TEMPERATURE: 98.9 F | HEART RATE: 87 BPM

## 2025-01-29 DIAGNOSIS — J42 CHRONIC BRONCHITIS, UNSPECIFIED CHRONIC BRONCHITIS TYPE (MULTI): ICD-10-CM

## 2025-01-29 DIAGNOSIS — Z85.118 HISTORY OF LUNG CANCER: ICD-10-CM

## 2025-01-29 DIAGNOSIS — R79.89 DECREASED TESTOSTERONE LEVEL: ICD-10-CM

## 2025-01-29 DIAGNOSIS — E11.9 DIABETES MELLITUS TYPE 2 WITHOUT RETINOPATHY (MULTI): Primary | ICD-10-CM

## 2025-01-29 DIAGNOSIS — C67.9 MALIGNANT NEOPLASM OF URINARY BLADDER, UNSPECIFIED SITE (MULTI): ICD-10-CM

## 2025-01-29 DIAGNOSIS — E11.9 TYPE 2 DIABETES MELLITUS WITHOUT COMPLICATION, WITHOUT LONG-TERM CURRENT USE OF INSULIN (MULTI): ICD-10-CM

## 2025-01-29 PROCEDURE — 4010F ACE/ARB THERAPY RXD/TAKEN: CPT | Performed by: INTERNAL MEDICINE

## 2025-01-29 PROCEDURE — 99214 OFFICE O/P EST MOD 30 MIN: CPT | Performed by: INTERNAL MEDICINE

## 2025-01-29 PROCEDURE — 1126F AMNT PAIN NOTED NONE PRSNT: CPT | Performed by: INTERNAL MEDICINE

## 2025-01-29 PROCEDURE — 1036F TOBACCO NON-USER: CPT | Performed by: INTERNAL MEDICINE

## 2025-01-29 ASSESSMENT — PAIN SCALES - GENERAL: PAINLEVEL_OUTOF10: 0-NO PAIN

## 2025-01-29 NOTE — PROGRESS NOTES
Subjective   Patient ID: Ravin Camacho is a 69 y.o. male who presents for Follow-up.  HPI  Interval urology 1/22/2025 and cardiology 12/12/2024 visits  Treated 1/3 with Augmentin and prednisone for respiratory symptoms predominantly cough  No symptoms of increased or decreased blood sugar    Review of Systems   Respiratory:  Positive for shortness of breath.    Cardiovascular: Negative.    Endocrine: Negative for polydipsia, polyphagia and polyuria.       Objective   Physical Exam  Constitutional:       General: He is not in acute distress.  HENT:      Mouth/Throat:      Pharynx: Oropharynx is clear.   Neck:      Comments: No JVD or accessory muscle use  Cardiovascular:      Rate and Rhythm: Normal rate and regular rhythm.      Heart sounds: Normal heart sounds.   Pulmonary:      Breath sounds: Normal breath sounds. No wheezing, rhonchi or rales.      Comments: O2 nasal cannula  Neurological:      Mental Status: He is alert.       Pulse 87   Temp 37.2 °C (98.9 °F) (Temporal)   SpO2 94%     Data  Lab 12/4/2024 glucose 163, creatinine 0.9/GFR 87  11/25/2024 testosterone 220, lipid at target levels, hemoglobin A1c 6.3%, normal TSH  BNP and D-dimer negative  ECG 10/10/2024  CT chest 8/16/2024  Chest x-ray 8/1/2024  Echocardiogram 8/9/2024 LVEF 65%  Urology consult 1/22/2025  Assessment/Plan     Chronic dyspnea multifactorial but primarily pulmonary on appropriate therapy  While no overt heart disease, normal kidney function, and good control of diabetes, recommend trial Farxiga for diabetes therapy and long-term cardiovascular and renal benefits  Voiding well after urologic procedures  Problem List Items Addressed This Visit       Malignant neoplasm of urinary bladder (Multi)    Chronic obstructive pulmonary disease (Multi)    Diabetes mellitus type 2 without retinopathy (Multi) - Primary    Relevant Medications    dapagliflozin propanediol (Farxiga) 10 mg    Type 2 diabetes mellitus    Decreased testosterone  level     Other Visit Diagnoses       History of lung cancer

## 2025-01-31 ENCOUNTER — TELEPHONE (OUTPATIENT)
Dept: PRIMARY CARE | Facility: CLINIC | Age: 70
End: 2025-01-31
Payer: MEDICARE

## 2025-02-05 ENCOUNTER — TELEPHONE (OUTPATIENT)
Dept: PRIMARY CARE | Facility: CLINIC | Age: 70
End: 2025-02-05
Payer: MEDICARE

## 2025-02-05 ENCOUNTER — PATIENT MESSAGE (OUTPATIENT)
Dept: PRIMARY CARE | Facility: CLINIC | Age: 70
End: 2025-02-05
Payer: MEDICARE

## 2025-02-05 DIAGNOSIS — E11.9 DIABETES MELLITUS TYPE 2 WITHOUT RETINOPATHY (MULTI): Primary | ICD-10-CM

## 2025-02-14 DIAGNOSIS — E11.9 TYPE 2 DIABETES MELLITUS WITHOUT COMPLICATIONS (MULTI): ICD-10-CM

## 2025-02-14 RX ORDER — METFORMIN HYDROCHLORIDE 500 MG/1
1000 TABLET, EXTENDED RELEASE ORAL 2 TIMES DAILY
Qty: 360 TABLET | Refills: 3 | Status: SHIPPED | OUTPATIENT
Start: 2025-02-14

## 2025-02-20 ENCOUNTER — HOSPITAL ENCOUNTER (OUTPATIENT)
Dept: RADIOLOGY | Facility: CLINIC | Age: 70
Discharge: HOME | End: 2025-02-20
Payer: MEDICARE

## 2025-02-20 DIAGNOSIS — C34.90 MALIGNANT NEOPLASM OF LUNG, UNSPECIFIED LATERALITY, UNSPECIFIED PART OF LUNG (MULTI): ICD-10-CM

## 2025-02-20 PROCEDURE — 71250 CT THORAX DX C-: CPT

## 2025-02-24 ENCOUNTER — TELEPHONE (OUTPATIENT)
Dept: RADIATION ONCOLOGY | Facility: HOSPITAL | Age: 70
End: 2025-02-24
Payer: MEDICARE

## 2025-02-24 NOTE — TELEPHONE ENCOUNTER
2/25/2025 at 1:00 Pt confirmed and will be present.    Pt is aware that the appointment is a phone/virtual visit, pt. understands that he/she doesn't have to show up in office.

## 2025-02-25 ENCOUNTER — HOSPITAL ENCOUNTER (OUTPATIENT)
Dept: RADIATION ONCOLOGY | Facility: HOSPITAL | Age: 70
Setting detail: RADIATION/ONCOLOGY SERIES
Discharge: HOME | End: 2025-02-25
Payer: MEDICARE

## 2025-02-25 DIAGNOSIS — C34.90 MALIGNANT NEOPLASM OF LUNG, UNSPECIFIED LATERALITY, UNSPECIFIED PART OF LUNG (MULTI): Primary | ICD-10-CM

## 2025-02-25 PROCEDURE — 99214 OFFICE O/P EST MOD 30 MIN: CPT | Mod: 95 | Performed by: NURSE PRACTITIONER

## 2025-02-25 PROCEDURE — 99214 OFFICE O/P EST MOD 30 MIN: CPT | Performed by: NURSE PRACTITIONER

## 2025-02-25 PROCEDURE — G2211 COMPLEX E/M VISIT ADD ON: HCPCS | Performed by: NURSE PRACTITIONER

## 2025-02-25 ASSESSMENT — ENCOUNTER SYMPTOMS
APNEA: 0
SHORTNESS OF BREATH: 1
WHEEZING: 0
FEVER: 0
ACTIVITY CHANGE: 0
HEMATOLOGIC/LYMPHATIC NEGATIVE: 1
CHEST TIGHTNESS: 0
FATIGUE: 0
FLANK PAIN: 0
UNEXPECTED WEIGHT CHANGE: 0
GASTROINTESTINAL NEGATIVE: 1
DIAPHORESIS: 0
DYSURIA: 0
CHOKING: 0
MUSCULOSKELETAL NEGATIVE: 1
COUGH: 1
APPETITE CHANGE: 0
HEMATURIA: 0
CHILLS: 0
PSYCHIATRIC NEGATIVE: 1
CARDIOVASCULAR NEGATIVE: 1
NEUROLOGICAL NEGATIVE: 1
DIFFICULTY URINATING: 0
STRIDOR: 0

## 2025-02-25 NOTE — PROGRESS NOTES
Patient ID: [id number]     Ravin Camacho is a 69 y.o. male who was previously seen at the [facility] Department of Radiation Oncology for NSCLC.  He is s/p RUL lobectomy 1999 for NSCLC, now with stage 1A2 [id number] (by PET) 1.8 cm adenoca of ASHLEY poor PFTs > Definitive SBRT 50 Gy 5 fx completed 8/6/21.     History of presenting illness    Telehealth visit with Ravin Camacho today. Patient is a 69 y.o. male who presents today for follow up 3 years and almost 7 months s/p SBRT to the ASHLEY.  Energy poor. Its hard to be active right now with his breathing and need for oxygen around the clock. Appetite good. Weight stable. He endorses SOB and cough at baseline. He is now more SOB and struggles more when laying down.  Stated he had a hard time with the CT scan.  No fevers, back pain or chest pain.  Using inhalers and nebulizer as prescribed.  No neurological complaints. Patient  completed systemic therapy with Dr. Frausto for Bladder cancer in June 2024. Next follow up in April.  S/p HoLEP with botox injection with Dr. Navarro. Not sure he notices a difference. Takes 5mg Cialis daily.    Review of systems:  Review of Systems   Constitutional:  Negative for activity change, appetite change, chills, diaphoresis, fatigue, fever and unexpected weight change.   HENT: Negative.     Respiratory:  Positive for cough and shortness of breath. Negative for apnea, choking, chest tightness, wheezing and stridor.    Cardiovascular: Negative.    Gastrointestinal: Negative.    Genitourinary:  Negative for decreased urine volume, difficulty urinating, dysuria, flank pain and hematuria.   Musculoskeletal: Negative.    Skin: Negative.    Neurological: Negative.    Hematological: Negative.    Psychiatric/Behavioral: Negative.         Past Medical history  He  has a past surgical history that includes Lung lobectomy (05/22/2015); Hand surgery (12/01/2016); Colonoscopy (12/01/2016); Other surgical history  (10/31/2019); Other surgical history (09/18/2022); Other surgical history (09/18/2022); Bladder surgery; Kidney stone surgery; and Knee Arthroplasty.      Radiology results:   CT chest wo IV contrast 02/20/2025    Narrative  Interpreted By:  Js Gilliland,  STUDY:  CT CHEST WO IV CONTRAST;  2/20/2025 10:35 am    INDICATION:  History of lung cancer    COMPARISON:  08/16/2024 and numerous older exams    ACCESSION NUMBER(S):  [id number]    ORDERING CLINICIAN:  TRUNG ALDRIDGE    TECHNIQUE:  Helical data acquisition of the chest was obtained  without IV  contrast material.  Images were reformatted in axial, coronal, and  sagittal planes.    FINDINGS:  LUNGS AND AIRWAYS:  The patient is again noted to be status post prior resection of the  right upper lobe with unremarkable appearance of the bronchial stump.  Moderately severe emphysema and chronic bronchitis. Mild right-sided  pleural thickening with associated coarse calcification and areas of  subpleural scarring in the lower lungs again identified. No pleural  effusion or pneumothorax. The trachea and mainstem bronchi are  patent. An irregular area of nodularity within the central left upper  lobe with somewhat oblique orientation and contiguous bandlike  opacity extending into the upper portion of the left upper lobe  measures 1.8 cm on series 3, image 124, appears stable as compared to  the immediate prior. A nodular density in this area was 1.0 cm on  06/05/2023. Some airway obliteration is now seen in this area in  association with a nodule and there is overall mild volume loss in  the left upper lobe.    MEDIASTINUM AND WILL, LOWER NECK AND AXILLA:  No significantly enlarged intrathoracic lymph nodes are identified.  Grossly stable appearing scattered subcentimeter nodes in the  mediastinum. The esophagus is not substantially dilated. No masses  are identified in the lower neck    HEART AND VESSELS:  Normal heart size. No pericardial effusion. Normal caliber  thoracic  aorta and pulmonary trunk. Scattered atherosclerotic calcifications.  Mildly calcified coronary arteries    UPPER ABDOMEN:  Small cyst in the upper pole of the left kidney. Small  low-attenuation foci in the upper pole of the right kidney again  present, presumably cysts. Hepatic cyst again noted. Small  nonobstructing calculi in the right kidney seen on the prior study  are not included in the field of view.    BONES AND SOFT TISSUES:  No significant soft tissue findings. No lytic or blastic osseous  lesion is identified    Impression  1.  Underlying smoking-related lung disease. Prior resection of the  right upper lobe without evidence for local disease recurrence.  Stable right pleural thickening with coarse calcification. A central  nodular density with somewhat oblique orientation and associated  airway obliteration with contiguous bandlike linear densities and  mild left upper lobe volume loss is 1.8 cm in diameter, stable since  the immediate prior and was 1.0 cm 06/05/2023. Per chart review, the  patient has had prior radiation. This likely reflects post radiation  change/granulation tissue, but the possibility of viable tumor can  not be excluded on the basis of noncontrast CT. No significant FDG  avidity noted on prior PET CT 06/21/2023. Follow-up PET CT may be  obtained as clinically appropriate to exclude significant metabolic  activity, versus close ongoing surveillance.    MACRO:  none    Signed by: Js Gilliland 2/21/2025 10:53 AM  Dictation workstation:   [id number]   Plan:  Assessment/Plan     69 year old male 3 years and almost 7 months s/p SBRT to the ASHLEY. Patient continues to endorse worsening SOB and had difficulty with positioning with this recent CT. Discussed pulmonary rehab. Was referred by pulmonology in past. Timing never worked out and they have not called lately to schedule. Will reach out to pulmonology team to see if we can get him in. He is ready to try it. Continue to use  oxygen, inhalers and nebulizer as prescribed. CT from 2/20/25 is stable.  Continues follow up with Dr. Frausto for bladder cancer as scheduled. Continue follow up with pulmonology and Dr. Navarro. Patient to return to clinic in  6 months with CT of the chest prior. Instructed to call with any questions or concerns.       I performed this visit using real- time telehealth tools , including an audio/video or telephone connection between Ravin Camacho  , who is in their home and Ethel Oneal CNP at [facility].

## 2025-03-02 PROBLEM — D23.9 DERMATOFIBROMA: Status: ACTIVE | Noted: 2023-04-05

## 2025-03-02 PROBLEM — Z85.29 HISTORY OF MALIGNANT NEOPLASM OF THORACIC CAVITY STRUCTURE: Status: ACTIVE | Noted: 2023-04-05

## 2025-03-02 PROBLEM — D21.9 BENIGN NEOPLASM OF SOFT TISSUE: Status: ACTIVE | Noted: 2023-04-05

## 2025-03-02 PROBLEM — G44.009 CLUSTER HEADACHES: Status: ACTIVE | Noted: 2023-04-05

## 2025-03-02 PROBLEM — M25.669 KNEE STIFFNESS: Status: ACTIVE | Noted: 2023-04-05

## 2025-03-02 PROBLEM — R06.02 SHORTNESS OF BREATH: Status: ACTIVE | Noted: 2023-04-05

## 2025-03-02 PROBLEM — R73.9 HYPERGLYCEMIA: Status: ACTIVE | Noted: 2023-04-05

## 2025-03-02 PROBLEM — H25.10 NUCLEAR SCLEROSIS: Status: ACTIVE | Noted: 2023-04-05

## 2025-03-02 PROBLEM — M25.559 ARTHRALGIA OF HIP: Status: ACTIVE | Noted: 2023-04-05

## 2025-03-02 PROBLEM — R22.30 LUMP ON FINGER: Status: ACTIVE | Noted: 2023-04-05

## 2025-03-02 PROBLEM — H11.129 CONJUNCTIVAL CONCRETION: Status: ACTIVE | Noted: 2023-04-05

## 2025-03-02 PROBLEM — H35.412 LATTICE DEGENERATION OF LEFT RETINA: Status: ACTIVE | Noted: 2023-04-05

## 2025-03-02 PROBLEM — M19.91 PRIMARY LOCALIZED OSTEOARTHRITIS: Status: ACTIVE | Noted: 2023-04-05

## 2025-03-02 PROBLEM — H91.8X3 ASYMMETRICAL HEARING LOSS: Status: ACTIVE | Noted: 2023-04-05

## 2025-03-02 PROBLEM — E11.9 TYPE 2 DIABETES MELLITUS: Status: ACTIVE | Noted: 2023-04-05

## 2025-03-02 PROBLEM — H60.509 ACUTE OTITIS EXTERNA: Status: ACTIVE | Noted: 2023-04-05

## 2025-03-02 RX ORDER — DAPAGLIFLOZIN 10 MG/1
10 TABLET, FILM COATED ORAL DAILY
Start: 2025-03-02 | End: 2026-03-02

## 2025-03-02 RX ORDER — COVID-19 VACCINE, MRNA 50 UG/.5ML
INJECTION, SUSPENSION INTRAMUSCULAR
COMMUNITY
Start: 2024-08-29

## 2025-03-02 RX ORDER — INFLUENZA A VIRUS A/VICTORIA/4897/2022 IVR-238 (H1N1) ANTIGEN (FORMALDEHYDE INACTIVATED), INFLUENZA A VIRUS A/CALIFORNIA/122/2022 SAN-022 (H3N2) ANTIGEN (FORMALDEHYDE INACTIVATED), AND INFLUENZA B VIRUS B/MICHIGAN/01/2021 ANTIGEN (FORMALDEHYDE INACTIVATED) 60; 60; 60 UG/.5ML; UG/.5ML; UG/.5ML
INJECTION, SUSPENSION INTRAMUSCULAR
COMMUNITY
Start: 2024-08-29

## 2025-03-02 ASSESSMENT — ACTIVITIES OF DAILY LIVING (ADL)
DRESSING: INDEPENDENT
MANAGING_FINANCES: INDEPENDENT
GROCERY_SHOPPING: INDEPENDENT
BATHING: INDEPENDENT
DOING_HOUSEWORK: INDEPENDENT
TAKING_MEDICATION: INDEPENDENT

## 2025-03-02 ASSESSMENT — ENCOUNTER SYMPTOMS
CARDIOVASCULAR NEGATIVE: 1
POLYPHAGIA: 0
SHORTNESS OF BREATH: 1
POLYDIPSIA: 0

## 2025-03-12 ENCOUNTER — APPOINTMENT (OUTPATIENT)
Dept: PRIMARY CARE | Facility: CLINIC | Age: 70
End: 2025-03-12
Payer: MEDICARE

## 2025-03-12 VITALS
WEIGHT: 245 LBS | BODY MASS INDEX: 35.07 KG/M2 | HEIGHT: 70 IN | SYSTOLIC BLOOD PRESSURE: 145 MMHG | HEART RATE: 86 BPM | DIASTOLIC BLOOD PRESSURE: 71 MMHG | OXYGEN SATURATION: 93 %

## 2025-03-12 DIAGNOSIS — J44.9 CHRONIC OBSTRUCTIVE PULMONARY DISEASE, UNSPECIFIED COPD TYPE (MULTI): ICD-10-CM

## 2025-03-12 DIAGNOSIS — C67.9 MALIGNANT NEOPLASM OF URINARY BLADDER, UNSPECIFIED SITE (MULTI): ICD-10-CM

## 2025-03-12 DIAGNOSIS — E11.9 TYPE 2 DIABETES MELLITUS WITHOUT COMPLICATION, WITHOUT LONG-TERM CURRENT USE OF INSULIN: ICD-10-CM

## 2025-03-12 DIAGNOSIS — J42 CHRONIC BRONCHITIS, UNSPECIFIED CHRONIC BRONCHITIS TYPE (MULTI): ICD-10-CM

## 2025-03-12 DIAGNOSIS — R79.89 DECREASED TESTOSTERONE LEVEL: ICD-10-CM

## 2025-03-12 DIAGNOSIS — R06.02 SHORTNESS OF BREATH: Primary | ICD-10-CM

## 2025-03-12 DIAGNOSIS — C34.90 MALIGNANT NEOPLASM OF LUNG, UNSPECIFIED LATERALITY, UNSPECIFIED PART OF LUNG (MULTI): ICD-10-CM

## 2025-03-12 PROCEDURE — G2211 COMPLEX E/M VISIT ADD ON: HCPCS | Performed by: INTERNAL MEDICINE

## 2025-03-12 PROCEDURE — 3008F BODY MASS INDEX DOCD: CPT | Performed by: INTERNAL MEDICINE

## 2025-03-12 PROCEDURE — 99214 OFFICE O/P EST MOD 30 MIN: CPT | Performed by: INTERNAL MEDICINE

## 2025-03-12 PROCEDURE — 3077F SYST BP >= 140 MM HG: CPT | Performed by: INTERNAL MEDICINE

## 2025-03-12 PROCEDURE — 3078F DIAST BP <80 MM HG: CPT | Performed by: INTERNAL MEDICINE

## 2025-03-12 PROCEDURE — 4010F ACE/ARB THERAPY RXD/TAKEN: CPT | Performed by: INTERNAL MEDICINE

## 2025-03-12 ASSESSMENT — COLUMBIA-SUICIDE SEVERITY RATING SCALE - C-SSRS
2. HAVE YOU ACTUALLY HAD ANY THOUGHTS OF KILLING YOURSELF?: NO
1. IN THE PAST MONTH, HAVE YOU WISHED YOU WERE DEAD OR WISHED YOU COULD GO TO SLEEP AND NOT WAKE UP?: NO
6. HAVE YOU EVER DONE ANYTHING, STARTED TO DO ANYTHING, OR PREPARED TO DO ANYTHING TO END YOUR LIFE?: NO

## 2025-03-12 NOTE — PROGRESS NOTES
"Subjective   Patient ID: Ravin Camacho is a 70 y.o. male who presents for Follow-up (6-8 week FUV ).  HPI  Substituted Jardiance for Farxiga for diabetes  Intended benefit diabetes management, reduce fluid volume, cardiovascular and renal benefits    Review of Systems   Constitutional: Negative.  Negative for fever and unexpected weight change.   Respiratory:  Positive for shortness of breath.         On nasal cannula oxygen   Cardiovascular: Negative.        Objective   Physical Exam  Constitutional:       General: He is not in acute distress.  HENT:      Mouth/Throat:      Pharynx: Oropharynx is clear.   Neck:      Comments: No JVD or accessory muscle use  Cardiovascular:      Rate and Rhythm: Normal rate and regular rhythm.      Heart sounds: Normal heart sounds.   Pulmonary:      Breath sounds: Normal breath sounds. No wheezing, rhonchi or rales.      Comments: O2 nasal cannula  Neurological:      Mental Status: He is alert.      Gait: Gait normal.       /71 (BP Location: Right arm, Patient Position: Sitting, BP Cuff Size: Large adult)   Pulse 86   Ht 1.778 m (5' 10\")   Wt 111 kg (245 lb)   SpO2 93%   BMI 35.15 kg/m²     Data  CT chest 2/20/2025 stable  Radiation oncology 2/25 stable  Lab 11/25/2024 hemoglobin A1c 6.3%, testosterone 220  Assessment/Plan     Chronic dyspnea primarily pulmonary disease COPD, lung surgery and radiation-oxygen, inhalers and pulmonary rehabilitation  Low testosterone may contribute  Diabetes stable, satisfactory  Recommend clinical pharmacy consultation    Problem List Items Addressed This Visit       Malignant neoplasm of urinary bladder (Multi)    Chronic obstructive pulmonary disease (Multi)    Relevant Orders    Referral to Clinical Pharmacy    Type 2 diabetes mellitus    Relevant Orders    Referral to Clinical Pharmacy    Shortness of breath - Primary    Malignant neoplasm of lung (Multi)    Decreased testosterone level          "

## 2025-03-21 DIAGNOSIS — J44.9 CHRONIC OBSTRUCTIVE PULMONARY DISEASE, UNSPECIFIED COPD TYPE (MULTI): ICD-10-CM

## 2025-03-30 ASSESSMENT — ENCOUNTER SYMPTOMS
FEVER: 0
UNEXPECTED WEIGHT CHANGE: 0
CONSTITUTIONAL NEGATIVE: 1
SHORTNESS OF BREATH: 1
CARDIOVASCULAR NEGATIVE: 1

## 2025-04-03 RX ORDER — CIPROFLOXACIN 500 MG/1
500 TABLET ORAL ONCE
Status: COMPLETED | OUTPATIENT
Start: 2025-04-03 | End: 2025-04-04

## 2025-04-04 ENCOUNTER — PROCEDURE VISIT (OUTPATIENT)
Dept: UROLOGY | Facility: HOSPITAL | Age: 70
End: 2025-04-04
Payer: MEDICARE

## 2025-04-04 VITALS — HEART RATE: 77 BPM | DIASTOLIC BLOOD PRESSURE: 70 MMHG | SYSTOLIC BLOOD PRESSURE: 156 MMHG

## 2025-04-04 DIAGNOSIS — C67.9 MALIGNANT NEOPLASM OF URINARY BLADDER, UNSPECIFIED SITE (MULTI): Primary | ICD-10-CM

## 2025-04-04 DIAGNOSIS — Z79.2 PROPHYLACTIC ANTIBIOTIC: ICD-10-CM

## 2025-04-04 LAB
POC APPEARANCE, URINE: CLEAR
POC BILIRUBIN, URINE: NEGATIVE
POC BLOOD, URINE: ABNORMAL
POC COLOR, URINE: YELLOW
POC GLUCOSE, URINE: ABNORMAL MG/DL
POC KETONES, URINE: NEGATIVE MG/DL
POC LEUKOCYTES, URINE: NEGATIVE
POC NITRITE,URINE: NEGATIVE
POC PH, URINE: 5.5 PH
POC PROTEIN, URINE: ABNORMAL MG/DL
POC SPECIFIC GRAVITY, URINE: 1.02
POC UROBILINOGEN, URINE: 0.2 EU/DL

## 2025-04-04 PROCEDURE — 81003 URINALYSIS AUTO W/O SCOPE: CPT | Performed by: UROLOGY

## 2025-04-04 PROCEDURE — 52000 CYSTOURETHROSCOPY: CPT | Performed by: UROLOGY

## 2025-04-04 PROCEDURE — 2500000001 HC RX 250 WO HCPCS SELF ADMINISTERED DRUGS (ALT 637 FOR MEDICARE OP): Performed by: UROLOGY

## 2025-04-04 RX ADMIN — CIPROFLOXACIN HYDROCHLORIDE 500 MG: 500 TABLET, FILM COATED ORAL at 11:04

## 2025-04-04 ASSESSMENT — PATIENT HEALTH QUESTIONNAIRE - PHQ9
1. LITTLE INTEREST OR PLEASURE IN DOING THINGS: NOT AT ALL
2. FEELING DOWN, DEPRESSED OR HOPELESS: NOT AT ALL
SUM OF ALL RESPONSES TO PHQ9 QUESTIONS 1 AND 2: 0

## 2025-04-04 NOTE — PROGRESS NOTES
FUV    Last seen - 8/8/24     HISTORY OF PRESENT ILLNESS:   Ravin Camacho is a 70 y.o. male who is being seen today for continued cystoscopic surveillance.  -T1 papillary urothelial carcinoma, high grade  -Pt has completed 12 months of maintenance of Gemcitabine/Docetaxel intravesical instillations by Charleen Sutton NP, last was on 6/4/24.   - 1.4cm stone in the left kidney, managed by Dr. Ambriz   -s/p HOLEP+ Botox 100U with Dr Navarro   PAST MEDICAL HISTORY:  Past Medical History:   Diagnosis Date    Acute otitis media 04/05/2023    Bladder cancer (Multi)     S/P CHEMO COMPLETE    BPH (benign prostatic hyperplasia)     Cervical disc disease     CERVICAL RADICULITIS    Cluster headache 04/05/2023    COPD (chronic obstructive pulmonary disease) (Multi)     HOME O2 3LNC AT REST, 4LNC WITH EXERTION, DR. Echeverria TASKED FOR CLEARANCE    Diastolic dysfunction     NEW, DR. ALVA APPT 12/4/24    Hip pain, right 04/05/2023    HL (hearing loss)     Hypertension     Kidney stone     1.4CM LEFT KIDNEY    Lattice degeneration of both retinas 04/05/2023    Lumbar disc disease     SPINAL STENOSIS    Lung cancer (Multi)     REOCCURANCE 4/2021, S/P LOBECTOMY 2015    On home O2     Personal history of irradiation 09/18/2022    History of radiation therapy    Pure hypercholesterolemia, unspecified     High cholesterol    Type 2 diabetes mellitus     A1C 5.9 8/11/23    Vitamin D deficiency     Wears dentures     LOWER       PAST SURGICAL HISTORY:  Past Surgical History:   Procedure Laterality Date    BLADDER SURGERY      TURBT MARCH 2023    COLONOSCOPY  12/01/2016    Complete Colonoscopy    HAND SURGERY  12/01/2016    Hand Surgery                                                                                                                                                          KIDNEY STONE SURGERY      May 2022    KNEE ARTHROPLASTY      right knee    LUNG LOBECTOMY  05/22/2015    Lung Lobectomy    OTHER SURGICAL HISTORY   10/31/2019    Knee replacement    OTHER SURGICAL HISTORY  09/18/2022    Epidural steroid injection    OTHER SURGICAL HISTORY  09/18/2022    Bronchoscopy        ALLERGIES:   No Known Allergies     MEDICATIONS:   Current Outpatient Medications   Medication Instructions    albuterol 90 mcg/actuation inhaler 2 puffs, Every 4 hours PRN    albuterol 2.5 mg, nebulization, Every 6 hours PRN    atorvastatin (LIPITOR) 20 mg, oral, Daily    blood sugar diagnostic (Blood Glucose Test) strip 1 strip, Daily    dapagliflozin propanediol (FARXIGA) 10 mg, oral, Daily    dietary supplement capsule 3 capsules    dietary supplement capsule 1 capsule, Daily    dietary supplement capsule 1 capsule, Daily    empagliflozin (JARDIANCE) 10 mg, oral, Daily    ergocalciferol (VITAMIN D-2) 1,250 mcg, oral, Weekly, SATURDAY    Fluzone High-Dose Triv 24-25 syringe     lancets (Fingerstix Lancets) misc Daily    losartan (Cozaar) 25 mg tablet TAKE 1 TABLET BY MOUTH EVERY DAY    meloxicam (MOBIC) 15 mg, oral, Daily PRN    metFORMIN XR (GLUCOPHAGE-XR) 1,000 mg, oral, 2 times daily    multivitamin with minerals (multivitamin with folic acid) tablet 1 tablet, Daily    oxygen (O2) gas therapy 1 each, Continuous    Spikevax 9905-7590,12y up,,PF, 50 mcg/0.5 mL syringe     tadalafil 20 mg, oral, Daily PRN    Trelegy Ellipta 100-62.5-25 mcg blister with device 1 puff, inhalation, Daily, Rinse mouth with water after use to reduce aftertaste and incidence of candidiasis. Do not swallow.        PHYSICAL EXAM:  Blood pressure 156/70, pulse 77.  Constitutional: Patient appears well-developed and well-nourished. No distress.    Pulmonary/Chest: Effort normal. No respiratory distress.   Abdominal: Soft, ND NT  : WNL  Musculoskeletal: Normal range of motion.    Neurological: Alert and oriented to person, place, and time.  Psychiatric: Normal mood and affect. Behavior is normal. Thought content normal.      Labs:  Lab Results   Component Value Date    TESTOSTERONE  "220 (L) 11/25/2024     Lab Results   Component Value Date    PSA 3.64 08/11/2023     No components found for: \"CBC\"  Lab Results   Component Value Date    CREATININE 0.95 12/04/2024     No components found for: \"TESTOTMS\"  Lab Results   Component Value Date    TESTF 64.3 10/28/2021     Imaging:  - Renal US on 4/18/24 demonstrated No bladder mass. Non obstructing bilateral nephrolithiasis without hydronephrosis. 1.4cm stone in the left kidney, will continue to follow with Dr. Ambriz for treatment on this.  - Results reviewed with patient. Patient reassured.      Discussion:  Doing well, no complaints. Denies any dysuria, hematuria, bothersome urinary frequency, urgency, or flank pain.  The cystoscopy was completed today due to T1 papillary urothelial carcinoma, high grade and demonstrated no tumors, stones or lesions. Cytology sent. 1 abx given to patient in clinic today. Patient instructed to follow up in 3 months for continued surveillance.    Cystoscopic surveillance schedule: q3 months until 3/2026, q6 until 3/2028, and yearly after no recurrences.  Assessment:      1. Malignant neoplasm of urinary bladder, unspecified site (Multi)  Cytology Consultation (Non-Gynecologic)    POCT UA Automated manually resulted    CANCELED: Cytology Consultation (Non-Gynecologic)    CANCELED: Cytology Consultation (Non-Gynecologic)      2. Prophylactic antibiotic  ciprofloxacin (Cipro) tablet 500 mg        T1 papillary urothelial carcinoma, high grade     Ravin Camacho is a 70 y.o. male here for FUV     Plan:   Will follow up in 3 months for continued cystoscopic surveillance.  Will get a renal US prior to next visit.   All questions and concerns were addressed. Patient verbalizes understanding and has no other questions at this time.     Scribe Attestation  By signing my name below, IFang, Paola   attest that this documentation has been prepared under the direction and in the presence of Mello Frausto MD.   "

## 2025-04-05 ENCOUNTER — LAB (OUTPATIENT)
Dept: LAB | Facility: HOSPITAL | Age: 70
End: 2025-04-05
Payer: MEDICARE

## 2025-04-05 DIAGNOSIS — C67.9 MALIGNANT NEOPLASM OF URINARY BLADDER, UNSPECIFIED SITE (MULTI): ICD-10-CM

## 2025-04-08 ENCOUNTER — APPOINTMENT (OUTPATIENT)
Dept: PHARMACY | Facility: HOSPITAL | Age: 70
End: 2025-04-08
Payer: MEDICARE

## 2025-04-08 DIAGNOSIS — E11.9 TYPE 2 DIABETES MELLITUS WITHOUT COMPLICATION, WITHOUT LONG-TERM CURRENT USE OF INSULIN: ICD-10-CM

## 2025-04-08 DIAGNOSIS — J42 CHRONIC BRONCHITIS, UNSPECIFIED CHRONIC BRONCHITIS TYPE (MULTI): Primary | ICD-10-CM

## 2025-04-08 DIAGNOSIS — E11.9 DIABETES MELLITUS TYPE 2 WITHOUT RETINOPATHY (MULTI): ICD-10-CM

## 2025-04-08 NOTE — ASSESSMENT & PLAN NOTE
Patient with COPD that is poorly controlled. Patient reported mMRC of 4 while adherent with Trelegy and O2 therapy. Pt has been using albuterol 4-5x per day. Pt saw pulmonology last year, has not FU since then. Pt agreed to start Pulm Rehab, will ask Pulm APRN for referral. Pt will also schedule FU. Will notify Pulm APRN if Trelegy 200-62.5-25 is beneficial in absence of asthma.     Medication Changes:  CONTINUE  Trelegy Ellipta 100-62.5-25 - 1 puff daily  O2 therapy  Albuterol PRN

## 2025-04-08 NOTE — PROGRESS NOTES
Clinical Pharmacy Appointment    Patient ID: Ravin Camacho is a 70 y.o. male who presents for COPD and Diabetes.    Pt is here for First appointment.     Referring Provider: Jose Antonio Prado MD  PCP: Jose Antonio Prado MD   Last visit with PCP: 3/12/2025   Next visit with PCP: 5/22/2025    Subjective   Medication Reconciliation:  Changed:   Added:   Discontinued: Farxiga    Drug Interactions  No relevant drug interactions were noted.    Medication System Management  Patient's preferred pharmacy: [facility]  Adherence/Organization: adherent to meds  Affordability/Accessibility: does not meet  PAP (income)      HPI  PULMONARY ASSESSMENT  Patient has been diagnosed with: COPD  Does patient see pulmonology: Yes    Date: 10/10/2024  has had PFT's completed in last 2 years    Current Regimen  Trelegy Ellipta 100-62.5-25 - 1 puff daily  O2 therapy  Albuterol PRN    Clarifications to above regimen: NA   Adverse Effects: NA   Appropriate technique? Yes    Historical Treatment  NA    Symptom Management  Current symptoms:  NA  Triggers: NA  Alleviating factors: NA  Exercise capacity: limited, limited d/t dyspnea  How often do you use your rescue inhaler? 4-5 times per day  mMRC score: 4      Exacerbation Hx  When was your last hospitalization for an exacerbation? NA  When was the last time you were treated with antibiotics and/or steroids? NA   Total number of hospitalizations d/t exacerbation: NA    Immunization History: reports up to date but not sure when.   Influenza: Date [NA]  PCV13: Date [NA]  PPSV23: Date [NA]  PCV20: Date [NA]  COVID: Date [NA]  RSV: Date [NA]    Smoking History  He has never smoked.    DIABETES MELLITUS TYPE 2:    Does patient follow with Endocrinology: No  Last optometry exam: 4/9/25  Most recent visit in Podiatry: NA-- patient denies sores or cuts on feet today      Current diabetic medications include:  Jardiance 10 mg daily  Metformin XR 1000mg BID    Clarifications to above regimen: NA  Adverse  Effects: NA    Past diabetic medications include:      Glucose Readings:  Glucometer/CGM Type: NA  Patient tests BG 0 times per day    Current home BG readings (mg/dL): NA   Previous home BG readings (mg/dL): NA    Any episodes of hypoglycemia? N/A.  Did patient treat episode of hypoglycemia appropriately? N/A  Does the patient have a prescription for ready-to-use Glucagon? Not on insulin    Lifestyle:  Diet: 2-3 meals/day. NA  Exercise: does not exercise  Activity type: Type of Exercise : NA  Is limited by: dyspnea  Tobacco history: None    Secondary Prevention:  Statin? Yes  ACE-I/ARB? Yes  Aspirin? No    Pertinent PMH Review:  PMH of Pancreatitis: No  PMH of Retinopathy: No  PMH of Urinary Tract Infections: No  PMH of MTC: No  PMH of MEN2: No  UACR/EGFR in last year?: No  Albumin/Creatine Ratio   Date Value Ref Range Status   10/28/2021 86.9 (H) 0.0 - 30.0 ug/mg crt Final   05/12/2020 233.3 (H) 0.0 - 30.0 ug/mg crt Final       Objective   No Known Allergies  Social History     Social History Narrative    Not on file      Medication Review  Current Outpatient Medications   Medication Instructions    albuterol 90 mcg/actuation inhaler 2 puffs, Every 4 hours PRN    albuterol 2.5 mg, nebulization, Every 6 hours PRN    atorvastatin (LIPITOR) 20 mg, oral, Daily    blood sugar diagnostic (Blood Glucose Test) strip 1 strip, Daily    dietary supplement capsule 3 capsules    dietary supplement capsule 1 capsule, Daily    dietary supplement capsule 1 capsule, Daily    empagliflozin (JARDIANCE) 10 mg, oral, Daily    ergocalciferol (VITAMIN D-2) 1,250 mcg, oral, Weekly, SATURDAY    Fluzone High-Dose Triv 24-25 syringe     lancets (Fingerstix Lancets) misc Daily    losartan (Cozaar) 25 mg tablet TAKE 1 TABLET BY MOUTH EVERY DAY    meloxicam (MOBIC) 15 mg, oral, Daily PRN    metFORMIN XR (GLUCOPHAGE-XR) 1,000 mg, oral, 2 times daily    multivitamin with minerals (multivitamin with folic acid) tablet 1 tablet, Daily    oxygen  (O2) gas therapy 1 each, Continuous    Spikevax 1901-1146,12y up,,PF, 50 mcg/0.5 mL syringe     tadalafil 20 mg, oral, Daily PRN    Trelegy Ellipta 100-62.5-25 mcg blister with device 1 puff, inhalation, Daily, Rinse mouth with water after use to reduce aftertaste and incidence of candidiasis. Do not swallow.      Vitals  BP Readings from Last 2 Encounters:   04/04/25 156/70   03/12/25 145/71     BMI Readings from Last 1 Encounters:   03/12/25 35.15 kg/m²      Labs  A1C  Lab Results   Component Value Date    HGBA1C 6.3 (H) 11/25/2024    HGBA1C 5.9 (A) 08/11/2023    HGBA1C 5.7 (A) 09/06/2022     BMP  Lab Results   Component Value Date    CALCIUM 9.8 12/04/2024     12/04/2024    K 4.8 12/04/2024    CO2 36 (H) 12/04/2024    CL 99 12/04/2024    BUN 19 12/04/2024    CREATININE 0.95 12/04/2024    EGFR 87 12/04/2024     LFTs  Lab Results   Component Value Date    ALT 18 09/06/2022    AST 16 09/06/2022     FLP  Lab Results   Component Value Date    TRIG 130 11/25/2024    CHOL 142 11/25/2024    LDLF 59 08/11/2023    LDLCALC 67 11/25/2024    HDL 49.0 11/25/2024     Urine Microalbumin  Lab Results   Component Value Date    MICROALBCREA 86.9 (H) 10/28/2021     Weight Management  Wt Readings from Last 3 Encounters:   03/12/25 111 kg (245 lb)   12/04/24 108 kg (239 lb)   11/21/24 107 kg (235 lb)      There is no height or weight on file to calculate BMI.     Assessment/Plan   Problem List Items Addressed This Visit       Chronic obstructive pulmonary disease (Multi) - Primary     Patient with COPD that is poorly controlled. Patient reported mMRC of 4 while adherent with Trelegy and O2 therapy. Pt has been using albuterol 4-5x per day. Pt saw pulmonology last year, has not FU since then. Pt agreed to start Pulm Rehab, will ask Pulm APRN for referral. Pt will also schedule FU. Will notify Pulm APRN if Trelegy 200-62.5-25 is beneficial in absence of asthma.     Medication Changes:  CONTINUE  Trelegy Ellipta 100-62.5-25 - 1  puff daily  O2 therapy  Albuterol PRN         Diabetes mellitus type 2 without retinopathy (Multi)     Patient's goal A1c is < 7%.  Is pt at goal? Yes, 6.3  Patient's SMBGs are NA.     Rationale for plan: Pt has controlled A1c, will reorder to assess DM status. Pt will not test BG but will trial Freestylenadeemiew request sent. Will reassess after Labs are completed. Plan to continue current therapy.     Medication Changes:  CONTINUE  Jardiance 10 mg daily  Metformin XR 1000mg BID            Clinical Pharmacist follow-up: 5/6 11 am    Continue all meds under the continuation of care with the referring provider and clinical pharmacy team.    Thank you,  Dayami Carroll PharmD [facility]  Clinical Pharmacy Resident, Primary Care     Verbal consent to manage patient's drug therapy was obtained from the patient. They were informed they may decline to participate or withdraw from participation in pharmacy services at any time.

## 2025-04-08 NOTE — ASSESSMENT & PLAN NOTE
Patient's goal A1c is < 7%.  Is pt at goal? Yes, 6.3  Patient's SMBGs are NA.     Rationale for plan: Pt has controlled A1c, will reorder to assess DM status. Pt will not open to testing BG. Will reassess after Labs are completed. Plan to continue current therapy.     Medication Changes:  CONTINUE  Jardiance 10 mg daily  Metformin XR 1000mg BID

## 2025-04-21 DIAGNOSIS — M17.12 UNILATERAL PRIMARY OSTEOARTHRITIS, LEFT KNEE: ICD-10-CM

## 2025-04-22 RX ORDER — MELOXICAM 15 MG/1
15 TABLET ORAL DAILY PRN
Qty: 90 TABLET | Refills: 1 | Status: SHIPPED | OUTPATIENT
Start: 2025-04-22

## 2025-04-24 LAB
ALBUMIN SERPL-MCNC: 4.4 G/DL (ref 3.6–5.1)
ALP SERPL-CCNC: 53 U/L (ref 35–144)
ALT SERPL-CCNC: 17 U/L (ref 9–46)
ANION GAP SERPL CALCULATED.4IONS-SCNC: 9 MMOL/L (CALC) (ref 7–17)
AST SERPL-CCNC: 15 U/L (ref 10–35)
BASOPHILS # BLD AUTO: 27 CELLS/UL (ref 0–200)
BASOPHILS NFR BLD AUTO: 0.4 %
BILIRUB SERPL-MCNC: 0.6 MG/DL (ref 0.2–1.2)
BUN SERPL-MCNC: 19 MG/DL (ref 7–25)
CALCIUM SERPL-MCNC: 9.5 MG/DL (ref 8.6–10.3)
CHLORIDE SERPL-SCNC: 101 MMOL/L (ref 98–110)
CO2 SERPL-SCNC: 35 MMOL/L (ref 20–32)
CREAT SERPL-MCNC: 1.12 MG/DL (ref 0.7–1.28)
EGFRCR SERPLBLD CKD-EPI 2021: 71 ML/MIN/1.73M2
EOSINOPHIL # BLD AUTO: 87 CELLS/UL (ref 15–500)
EOSINOPHIL NFR BLD AUTO: 1.3 %
ERYTHROCYTE [DISTWIDTH] IN BLOOD BY AUTOMATED COUNT: 11.7 % (ref 11–15)
EST. AVERAGE GLUCOSE BLD GHB EST-MCNC: 137 MG/DL
EST. AVERAGE GLUCOSE BLD GHB EST-SCNC: 7.6 MMOL/L
GLUCOSE SERPL-MCNC: 138 MG/DL (ref 65–99)
HBA1C MFR BLD: 6.4 %
HCT VFR BLD AUTO: 50.6 % (ref 38.5–50)
HGB BLD-MCNC: 16.8 G/DL (ref 13.2–17.1)
LYMPHOCYTES # BLD AUTO: 1487 CELLS/UL (ref 850–3900)
LYMPHOCYTES NFR BLD AUTO: 22.2 %
MCH RBC QN AUTO: 31.8 PG (ref 27–33)
MCHC RBC AUTO-ENTMCNC: 33.2 G/DL (ref 32–36)
MCV RBC AUTO: 95.7 FL (ref 80–100)
MONOCYTES # BLD AUTO: 583 CELLS/UL (ref 200–950)
MONOCYTES NFR BLD AUTO: 8.7 %
NEUTROPHILS # BLD AUTO: 4516 CELLS/UL (ref 1500–7800)
NEUTROPHILS NFR BLD AUTO: 67.4 %
PLATELET # BLD AUTO: 220 THOUSAND/UL (ref 140–400)
PMV BLD REES-ECKER: 9.9 FL (ref 7.5–12.5)
POTASSIUM SERPL-SCNC: 5 MMOL/L (ref 3.5–5.3)
PROT SERPL-MCNC: 6.5 G/DL (ref 6.1–8.1)
RBC # BLD AUTO: 5.29 MILLION/UL (ref 4.2–5.8)
SODIUM SERPL-SCNC: 145 MMOL/L (ref 135–146)
WBC # BLD AUTO: 6.7 THOUSAND/UL (ref 3.8–10.8)

## 2025-04-29 ENCOUNTER — APPOINTMENT (OUTPATIENT)
Dept: OPHTHALMOLOGY | Facility: CLINIC | Age: 70
End: 2025-04-29
Payer: MEDICARE

## 2025-04-29 DIAGNOSIS — H52.203 HYPEROPIA OF BOTH EYES WITH ASTIGMATISM AND PRESBYOPIA: ICD-10-CM

## 2025-04-29 DIAGNOSIS — H35.412 LATTICE DEGENERATION OF LEFT RETINA: ICD-10-CM

## 2025-04-29 DIAGNOSIS — H52.03 HYPEROPIA OF BOTH EYES WITH ASTIGMATISM AND PRESBYOPIA: ICD-10-CM

## 2025-04-29 DIAGNOSIS — H52.4 HYPEROPIA OF BOTH EYES WITH ASTIGMATISM AND PRESBYOPIA: ICD-10-CM

## 2025-04-29 DIAGNOSIS — E11.9 DIABETES MELLITUS TYPE 2 WITHOUT RETINOPATHY (MULTI): ICD-10-CM

## 2025-04-29 DIAGNOSIS — H25.813 COMBINED FORMS OF AGE-RELATED CATARACT OF BOTH EYES: Primary | ICD-10-CM

## 2025-04-29 PROCEDURE — 92015 DETERMINE REFRACTIVE STATE: CPT | Performed by: OPTOMETRIST

## 2025-04-29 PROCEDURE — 92014 COMPRE OPH EXAM EST PT 1/>: CPT | Performed by: OPTOMETRIST

## 2025-04-29 ASSESSMENT — CONF VISUAL FIELD
OS_NORMAL: 1
OS_INFERIOR_NASAL_RESTRICTION: 0
OS_INFERIOR_TEMPORAL_RESTRICTION: 0
OD_SUPERIOR_TEMPORAL_RESTRICTION: 0
OD_NORMAL: 1
OD_SUPERIOR_NASAL_RESTRICTION: 0
OD_INFERIOR_NASAL_RESTRICTION: 0
OD_INFERIOR_TEMPORAL_RESTRICTION: 0
OS_SUPERIOR_NASAL_RESTRICTION: 0
OS_SUPERIOR_TEMPORAL_RESTRICTION: 0

## 2025-04-29 ASSESSMENT — ENCOUNTER SYMPTOMS
MUSCULOSKELETAL NEGATIVE: 0
HEMATOLOGIC/LYMPHATIC NEGATIVE: 0
EYES NEGATIVE: 1
NEUROLOGICAL NEGATIVE: 0
RESPIRATORY NEGATIVE: 0
PSYCHIATRIC NEGATIVE: 0
ALLERGIC/IMMUNOLOGIC NEGATIVE: 0
CONSTITUTIONAL NEGATIVE: 0
GASTROINTESTINAL NEGATIVE: 0
ENDOCRINE NEGATIVE: 0
CARDIOVASCULAR NEGATIVE: 0

## 2025-04-29 ASSESSMENT — REFRACTION_WEARINGRX
OD_AXIS: 090
OD_SPHERE: +2.50
OS_SPHERE: +2.25
OS_CYLINDER: SPHERE
OS_ADD: +2.50
OD_ADD: +2.50
OD_CYLINDER: -0.25

## 2025-04-29 ASSESSMENT — REFRACTION_MANIFEST
OD_ADD: +2.50
OD_SPHERE: +2.25
OS_SPHERE: +2.50
OD_CYLINDER: -0.25
OD_AXIS: 090
OS_CYLINDER: SPHERE
OS_ADD: +2.50

## 2025-04-29 ASSESSMENT — VISUAL ACUITY
OS_CC: 20/30
OD_BAT_MED: 20/50
OD_CC: 20/25
METHOD: SNELLEN - LINEAR
OS_BAT_MED: 20/60

## 2025-04-29 ASSESSMENT — EXTERNAL EXAM - LEFT EYE: OS_EXAM: NORMAL

## 2025-04-29 ASSESSMENT — CUP TO DISC RATIO
OD_RATIO: .2, NO NVD
OS_RATIO: .2, NO NVD

## 2025-04-29 ASSESSMENT — TONOMETRY
OD_IOP_MMHG: 14
IOP_METHOD: GOLDMANN APPLANATION
OS_IOP_MMHG: 14

## 2025-04-29 ASSESSMENT — EXTERNAL EXAM - RIGHT EYE: OD_EXAM: NORMAL

## 2025-04-29 ASSESSMENT — SLIT LAMP EXAM - LIDS
COMMENTS: 1+ MGD, DERMATOCHALASIS
COMMENTS: 1+ MGD, DERMATOCHALASIS

## 2025-04-29 NOTE — PROGRESS NOTES
A spectacle prescription was given at the patient`s request. Minimal change.    DMII No retinopathy. I discussed the value of good blood sugar control under your management and annual eye exams.     Lattice degeneration of the retina. The patient was asked to return to our clinic or seek out eye care ASAP if new flashes of light or floaters are noted     Cataracts and will monitor. [facility] corrects to 20/20 20/25  OD/OS BAT 20/50 20/60     The patient was asked to return to [facility] in one year or sooner if ocular or vision changes occur. Manifest refraction (MR) DFE    Referred for cataract evaluation. BAT 20/50 20/60 OD/OS.

## 2025-05-02 ENCOUNTER — TELEPHONE (OUTPATIENT)
Dept: CARDIAC REHAB | Facility: CLINIC | Age: 70
End: 2025-05-02
Payer: MEDICARE

## 2025-05-02 DIAGNOSIS — E11.9 DIABETES MELLITUS TYPE 2 WITHOUT RETINOPATHY (MULTI): ICD-10-CM

## 2025-05-03 RX ORDER — EMPAGLIFLOZIN 10 MG/1
10 TABLET, FILM COATED ORAL DAILY
Qty: 30 TABLET | Refills: 2 | Status: SHIPPED | OUTPATIENT
Start: 2025-05-03

## 2025-05-06 ENCOUNTER — APPOINTMENT (OUTPATIENT)
Dept: PHARMACY | Facility: HOSPITAL | Age: 70
End: 2025-05-06
Payer: MEDICARE

## 2025-05-06 DIAGNOSIS — E11.9 DIABETES MELLITUS TYPE 2 WITHOUT RETINOPATHY (MULTI): ICD-10-CM

## 2025-05-06 DIAGNOSIS — J42 CHRONIC BRONCHITIS, UNSPECIFIED CHRONIC BRONCHITIS TYPE (MULTI): ICD-10-CM

## 2025-05-06 RX ORDER — FLUTICASONE FUROATE, UMECLIDINIUM BROMIDE AND VILANTEROL TRIFENATATE 200; 62.5; 25 UG/1; UG/1; UG/1
1 POWDER RESPIRATORY (INHALATION)
Qty: 60 EACH | Refills: 11 | Status: SHIPPED | OUTPATIENT
Start: 2025-05-06

## 2025-05-06 NOTE — ASSESSMENT & PLAN NOTE
"Patient with COPD that is poorly controlled. Previously discussed increasing trelegy to 200-62.5-25. Discussed APRN. APRN approved to increase. Pt declined history of recurrent pneumonia infections, last one was in \"covid times\". Pt to start Trelegy 200-62.5-25 and attend appt with pulmonology on 5/28/25. Pt has tried calling pulomonology rehab but was not successful in making appt. Will FU with APRN.     Medication Changes:  STOP  Trelegy 100-62.5-25  START  Trelegy 200-62.5-25  "

## 2025-05-06 NOTE — ASSESSMENT & PLAN NOTE
Patient's goal A1c is < 7%.  Is pt at goal? Yes, 6.4  Patient's SMBGs are NA.     Rationale for plan: pt reported controlled A1c, does not test BG. Pt to continue current regimen and focus on decreasing sugar and card diet.     Medication Changes:  CONTINUE  Jardiance 10 mg daily  Metformin XR 1000mg BID

## 2025-05-06 NOTE — PROGRESS NOTES
Clinical Pharmacy Appointment    Patient ID: Ravin Camacho is a 70 y.o. male who presents for COPD and Diabetes.    Pt is here for Follow Up appointment.     Referring Provider: Jose Antonio Prado MD  PCP: Jose Antonio Prado MD   Last visit with PCP: 3/12/2025   Next visit with PCP: 5/22/2025    Subjective   Medication Reconciliation:  Changed:   Added:   Discontinued: Farxiga    Drug Interactions  No relevant drug interactions were noted.    Medication System Management  Patient's preferred pharmacy: [facility]  Adherence/Organization: adherent to meds  Affordability/Accessibility: does not meet  PAP (income)      HPI  PULMONARY ASSESSMENT  Patient has been diagnosed with: COPD  Does patient see pulmonology: Yes    Date: 10/10/2024  has had PFT's completed in last 2 years    Current Regimen  Trelegy Ellipta 100-62.5-25 - 1 puff daily  O2 therapy  Albuterol PRN    Clarifications to above regimen: NA   Adverse Effects: NA   Appropriate technique? Yes    Historical Treatment  NA    Symptom Management  Current symptoms: NA  Triggers: NA  Alleviating factors: NA  Exercise capacity: limited, limited d/t dyspnea  How often do you use your rescue inhaler? 4-5 times per day  mMRC score: 4      Exacerbation Hx  When was your last hospitalization for an exacerbation? NA  When was the last time you were treated with antibiotics and/or steroids? NA   Total number of hospitalizations d/t exacerbation: NA    Immunization History: reports up to date but not sure when.   Influenza: Date [NA]  PCV13: Date [NA]  PPSV23: Date [NA]  PCV20: Date [NA]  COVID: Date [NA]  RSV: Date [NA]    Smoking History  He has never smoked.    DIABETES MELLITUS TYPE 2:    Does patient follow with Endocrinology: No  Last optometry exam: 4/9/25  Most recent visit in Podiatry: NA-- patient denies sores or cuts on feet today      Current diabetic medications include:  Jardiance 10 mg daily  Metformin XR 1000mg BID    Clarifications to above regimen:  NA  Adverse Effects: NA    Past diabetic medications include:      Glucose Readings:  Glucometer/CGM Type: NA  Patient tests BG 0 times per day    Current home BG readings (mg/dL): NA   Previous home BG readings (mg/dL): NA    Any episodes of hypoglycemia? N/A.  Did patient treat episode of hypoglycemia appropriately? N/A  Does the patient have a prescription for ready-to-use Glucagon? Not on insulin    Lifestyle:  Diet: 2-3 meals/day. NA  Exercise: does not exercise  Activity type: Type of Exercise : NA  Is limited by: dyspnea  Tobacco history: None    Secondary Prevention:  Statin? Yes  ACE-I/ARB? Yes  Aspirin? No    Pertinent PMH Review:  PMH of Pancreatitis: No  PMH of Retinopathy: No  PMH of Urinary Tract Infections: No  PMH of MTC: No  PMH of MEN2: No  UACR/EGFR in last year?: No  Albumin/Creatine Ratio   Date Value Ref Range Status   10/28/2021 86.9 (H) 0.0 - 30.0 ug/mg crt Final   05/12/2020 233.3 (H) 0.0 - 30.0 ug/mg crt Final       Objective   No Known Allergies  Social History     Social History Narrative    Not on file      Medication Review  Current Outpatient Medications   Medication Instructions    albuterol 90 mcg/actuation inhaler 2 puffs, Every 4 hours PRN    albuterol 2.5 mg, nebulization, Every 6 hours PRN    atorvastatin (LIPITOR) 20 mg, oral, Daily    blood sugar diagnostic (Blood Glucose Test) strip 1 strip, Daily    dietary supplement capsule 3 capsules    dietary supplement capsule 1 capsule, Daily    dietary supplement capsule 1 capsule, Daily    ergocalciferol (VITAMIN D-2) 1,250 mcg, oral, Weekly, SATURDAY    fluticasone-umeclidin-vilanter (Trelegy Ellipta) 200-62.5-25 mcg blister with device 1 puff, inhalation, Daily before breakfast    Fluzone High-Dose Triv 24-25 syringe     Jardiance 10 mg, oral, Daily    lancets (Fingerstix Lancets) misc Daily    losartan (Cozaar) 25 mg tablet TAKE 1 TABLET BY MOUTH EVERY DAY    meloxicam (MOBIC) 15 mg, oral, Daily PRN    metFORMIN XR (GLUCOPHAGE-XR)  "1,000 mg, oral, 2 times daily    multivitamin with minerals (multivitamin with folic acid) tablet 1 tablet, Daily    oxygen (O2) gas therapy 1 each, Continuous    Spikevax 4880-2825,12y up,,PF, 50 mcg/0.5 mL syringe     tadalafil 20 mg, oral, Daily PRN      Vitals  BP Readings from Last 2 Encounters:   04/04/25 156/70   03/12/25 145/71     BMI Readings from Last 1 Encounters:   03/12/25 35.15 kg/m²      Labs  A1C  Lab Results   Component Value Date    HGBA1C 6.4 (H) 04/23/2025    HGBA1C 6.3 (H) 11/25/2024    HGBA1C 5.9 (A) 08/11/2023     BMP  Lab Results   Component Value Date    CALCIUM 9.5 04/23/2025     04/23/2025    K 5.0 04/23/2025    CO2 35 (H) 04/23/2025     04/23/2025    BUN 19 04/23/2025    CREATININE 1.12 04/23/2025    EGFR 71 04/23/2025     LFTs  Lab Results   Component Value Date    ALT 17 04/23/2025    AST 15 04/23/2025    ALKPHOS 53 04/23/2025    BILITOT 0.6 04/23/2025     FLP  Lab Results   Component Value Date    TRIG 130 11/25/2024    CHOL 142 11/25/2024    LDLF 59 08/11/2023    LDLCALC 67 11/25/2024    HDL 49.0 11/25/2024     Urine Microalbumin  Lab Results   Component Value Date    MICROALBCREA 86.9 (H) 10/28/2021     Weight Management  Wt Readings from Last 3 Encounters:   03/12/25 111 kg (245 lb)   12/04/24 108 kg (239 lb)   11/21/24 107 kg (235 lb)      There is no height or weight on file to calculate BMI.     Assessment/Plan   Problem List Items Addressed This Visit       Chronic obstructive pulmonary disease (Multi)    Patient with COPD that is poorly controlled. Previously discussed increasing trelegy to 200-62.5-25. Discussed APRN. APRN approved to increase. Pt declined history of recurrent pneumonia infections, last one was in \"covid times\". Pt to start Trelegy 200-62.5-25 and attend appt with pulmonology on 5/28/25. Pt has tried calling pulomonology rehab but was not successful in making appt. Will FU with APRN.     Medication Changes:  STOP  Trelegy " 100-62.5-25  START  Trelegy 200-62.5-25         Relevant Medications    fluticasone-umeclidin-vilanter (Trelegy Ellipta) 200-62.5-25 mcg blister with device    Other Relevant Orders    Referral to Clinical Pharmacy    Diabetes mellitus type 2 without retinopathy (Multi)    Patient's goal A1c is < 7%.  Is pt at goal? Yes, 6.4  Patient's SMBGs are NA.     Rationale for plan: pt reported controlled A1c, does not test BG. Pt to continue current regimen and focus on decreasing sugar and card diet.     Medication Changes:  CONTINUE  Jardiance 10 mg daily  Metformin XR 1000mg BID         Relevant Orders    Referral to Clinical Pharmacy         Clinical Pharmacist follow-up: 6/3 11 am    Continue all meds under the continuation of care with the referring provider and clinical pharmacy team.    Thank you,  Dayami Carroll PharmD [facility]  Clinical Pharmacy Resident, Primary Care     Verbal consent to manage patient's drug therapy was obtained from the patient. They were informed they may decline to participate or withdraw from participation in pharmacy services at any time.

## 2025-05-20 ENCOUNTER — APPOINTMENT (OUTPATIENT)
Dept: RADIOLOGY | Facility: CLINIC | Age: 70
End: 2025-05-20
Payer: MEDICARE

## 2025-05-20 ENCOUNTER — TELEPHONE (OUTPATIENT)
Dept: CARDIAC REHAB | Facility: CLINIC | Age: 70
End: 2025-05-20

## 2025-05-20 DIAGNOSIS — N20.0 CALCULUS OF KIDNEY: ICD-10-CM

## 2025-05-20 PROCEDURE — 76770 US EXAM ABDO BACK WALL COMP: CPT

## 2025-05-20 PROCEDURE — 76770 US EXAM ABDO BACK WALL COMP: CPT | Performed by: STUDENT IN AN ORGANIZED HEALTH CARE EDUCATION/TRAINING PROGRAM

## 2025-05-20 NOTE — TELEPHONE ENCOUNTER
This is the second time I'm trying to return the patients call. His voicemail is still full. I am still unable to leave a message.

## 2025-05-22 ENCOUNTER — APPOINTMENT (OUTPATIENT)
Dept: PRIMARY CARE | Facility: CLINIC | Age: 70
End: 2025-05-22
Payer: MEDICARE

## 2025-05-22 DIAGNOSIS — E66.01 OBESITY, MORBID (MULTI): ICD-10-CM

## 2025-05-22 DIAGNOSIS — N20.0 NEPHROLITHIASIS: ICD-10-CM

## 2025-05-22 DIAGNOSIS — R06.02 SHORTNESS OF BREATH: ICD-10-CM

## 2025-05-22 DIAGNOSIS — Z85.118 HISTORY OF LUNG CANCER: ICD-10-CM

## 2025-05-22 DIAGNOSIS — C67.9 MALIGNANT NEOPLASM OF URINARY BLADDER, UNSPECIFIED SITE (MULTI): ICD-10-CM

## 2025-05-22 DIAGNOSIS — E11.9 TYPE 2 DIABETES MELLITUS WITHOUT COMPLICATION, WITHOUT LONG-TERM CURRENT USE OF INSULIN: ICD-10-CM

## 2025-05-22 DIAGNOSIS — J42 CHRONIC BRONCHITIS, UNSPECIFIED CHRONIC BRONCHITIS TYPE (MULTI): Primary | ICD-10-CM

## 2025-05-22 DIAGNOSIS — Z90.2 HISTORY OF LOBECTOMY OF LUNG: ICD-10-CM

## 2025-05-22 DIAGNOSIS — C34.90 MALIGNANT NEOPLASM OF LUNG, UNSPECIFIED LATERALITY, UNSPECIFIED PART OF LUNG (MULTI): ICD-10-CM

## 2025-05-22 PROCEDURE — 1159F MED LIST DOCD IN RCRD: CPT | Performed by: INTERNAL MEDICINE

## 2025-05-22 PROCEDURE — 4010F ACE/ARB THERAPY RXD/TAKEN: CPT | Performed by: INTERNAL MEDICINE

## 2025-05-22 PROCEDURE — 1160F RVW MEDS BY RX/DR IN RCRD: CPT | Performed by: INTERNAL MEDICINE

## 2025-05-22 PROCEDURE — 99214 OFFICE O/P EST MOD 30 MIN: CPT | Performed by: INTERNAL MEDICINE

## 2025-05-22 PROCEDURE — 3074F SYST BP LT 130 MM HG: CPT | Performed by: INTERNAL MEDICINE

## 2025-05-22 PROCEDURE — 3008F BODY MASS INDEX DOCD: CPT | Performed by: INTERNAL MEDICINE

## 2025-05-22 PROCEDURE — 1036F TOBACCO NON-USER: CPT | Performed by: INTERNAL MEDICINE

## 2025-05-22 PROCEDURE — 3078F DIAST BP <80 MM HG: CPT | Performed by: INTERNAL MEDICINE

## 2025-05-22 NOTE — PROGRESS NOTES
Subjective   Patient ID: Ravin Camacho is a 70 y.o. male who presents for Follow-up.  History of Present Illness  Ravin Camacho is a 70 year old male with diabetes and chronic obstructive pulmonary disease who presents for follow-up on medication adjustments and recent lab results.    His diabetes is well-controlled with an A1c of 6.4. He continues to take Jardiance and metformin. His blood pressure tends to be high initially but normalizes after a short period of rest. He has a history of high blood pressure, with recent readings showing improvement.    He experiences shortness of breath and is using Trelegy, which was recently doubled in dosage. He is unsure if there has been a noticeable improvement yet. He uses albuterol four to five times a day, particularly before bed and before activities like walking his dog. He is scheduled to start pulmonary rehabilitation next month, with an initial appointment followed by 36 visits.    He has kidney stones, with a recent renal ultrasound showing multiple bilateral nonobstructing renal calculi, up to 9 mm in the right kidney and 7 mm in the left kidney. He experiences intermittent pain, which he describes as 'comes and goes wherever.'    He mentions frequent urination during the day, almost every time he stands up after sitting for an hour, but only once at night.    His weight is noted to be 245 pounds, which he describes as 'pretty heavy,' and he has not noticed any significant changes. He recalls a previous recommendation to try Farxiga, but he is currently on Jardiance instead.    He notes swelling in his feet and ankles by the end of the day, which resolves by morning.    Review of Systems   Constitutional:  Positive for fatigue. Negative for fever and unexpected weight change.   Respiratory:  Positive for shortness of breath.    Cardiovascular: Negative.    Gastrointestinal:  Negative for abdominal pain.   Endocrine: Negative for polydipsia, polyphagia  "and polyuria.   Genitourinary:  Positive for frequency. Negative for difficulty urinating, dysuria and hematuria.     ROS otherwise negative aside from what was mentioned above in HPI.    Objective     /70   Pulse 74   Resp 18   Ht 1.778 m (5' 10\")   Wt 111 kg (245 lb)   SpO2 93%   BMI 35.15 kg/m²      Current Outpatient Medications   Medication Instructions    albuterol 90 mcg/actuation inhaler 2 puffs, Every 4 hours PRN    albuterol 2.5 mg, nebulization, Every 6 hours PRN    atorvastatin (LIPITOR) 20 mg, oral, Daily    blood sugar diagnostic (Blood Glucose Test) strip 1 strip, Daily    dietary supplement capsule 3 capsules    dietary supplement capsule 1 capsule, Daily    ergocalciferol (VITAMIN D-2) 1,250 mcg, oral, Weekly, SATURDAY    fluticasone-umeclidin-vilanter (Trelegy Ellipta) 200-62.5-25 mcg blister with device 1 puff, inhalation, Daily before breakfast    Jardiance 10 mg, oral, Daily    lancets (Fingerstix Lancets) misc Daily    losartan (Cozaar) 25 mg tablet TAKE 1 TABLET BY MOUTH EVERY DAY    meloxicam (MOBIC) 15 mg, oral, Daily PRN    metFORMIN XR (GLUCOPHAGE-XR) 1,000 mg, oral, 2 times daily    multivitamin with minerals (multivitamin with folic acid) tablet 1 tablet, Daily    oxygen (O2) gas therapy 1 each, Continuous    tadalafil 20 mg, oral, Daily PRN       Physical Exam  Constitutional:       General: He is not in acute distress.     Appearance: He is obese.   HENT:      Mouth/Throat:      Pharynx: Oropharynx is clear.   Neck:      Comments: No JVD or accessory muscle use  Cardiovascular:      Rate and Rhythm: Normal rate and regular rhythm.      Pulses: Normal pulses.      Heart sounds: Normal heart sounds.   Pulmonary:      Breath sounds: Normal breath sounds. No wheezing, rhonchi or rales.      Comments: O2 nasal cannula  Shallow frequent respirations  Abdominal:      General: Bowel sounds are normal. There is no distension.      Palpations: Abdomen is soft.   Neurological:      " Mental Status: He is alert.      Gait: Gait normal.       Physical Exam  VITALS: P- 74, BP- 124/70, SaO2- 93%  MEASUREMENTS: Weight- 245.  CHEST: Lungs clear to auscultation bilaterally, no wheezing, no fluid.     Results  LABS  Hemoglobin A1c: 6.4%  Creatinine: 1.1 mg/dL  GFR: 70 mL/min/1.73 m²  Hematocrit 50%  CO2 35    RADIOLOGY  Renal Ultrasound: Multiple bilateral nonobstructing renal calculi up to 9 mm in the right kidney, 7 mm in the left kidney (05/20/2025)  Chest CT 2/25-status post right upper lobe resection, chronic changes    DIAGNOSTIC  Echocardiogram: Left ventricular ejection fraction 65%, mildly enlarged right ventricle, impaired relaxation (08/2024)  Estimated pulmonary artery pressure normal    Assessment & Plan  Chronic obstructive pulmonary disease  COPD management ongoing with Trelegy inhaler at increased dosage. Effectiveness uncertain. Albuterol used 4-5 times daily. Pulmonary rehabilitation scheduled.  Mild elevation in hematocrit and carbon dioxide  - Continue Trelegy inhaler at adjusted dose.  - Continue albuterol as needed.  - Proceed with scheduled pulmonary rehabilitation.    Shortness of breath  Shortness of breath likely due to COPD and possible fluid balance issues. Echocardiogram shows normal LV function, mildly enlarged RV. Pulmonary rehabilitation anticipated to help.  - Proceed with pulmonary rehabilitation.    Type 2 diabetes mellitus  Type 2 diabetes well-controlled with A1c of 6.4. Jardiance chosen for heart and kidney benefits, aiding fluid balance and breathing.  - Continue Jardiance.  - Monitor A1c levels regularly.    Bilateral nonobstructing renal calculi  Renal ultrasound shows multiple bilateral nonobstructing calculi, up to 9 mm right kidney, 7 mm left kidney. Normal kidney function tests. No current obstruction or pain.  - Monitor renal calculi for any changes or symptoms.        Jose Antonio Prado MD     This medical note was created with the assistance of artificial  intelligence (AI) for documentation purposes. The content has been reviewed and confirmed by the healthcare provider for accuracy and completeness. Patient consented to the use of audio recording and use of AI during their visit.

## 2025-05-23 DIAGNOSIS — N52.9 ERECTILE DYSFUNCTION, UNSPECIFIED ERECTILE DYSFUNCTION TYPE: ICD-10-CM

## 2025-05-28 ENCOUNTER — OFFICE VISIT (OUTPATIENT)
Dept: PULMONOLOGY | Facility: HOSPITAL | Age: 70
End: 2025-05-28
Payer: MEDICARE

## 2025-05-28 VITALS
WEIGHT: 250.22 LBS | HEART RATE: 80 BPM | OXYGEN SATURATION: 97 % | SYSTOLIC BLOOD PRESSURE: 134 MMHG | RESPIRATION RATE: 22 BRPM | DIASTOLIC BLOOD PRESSURE: 75 MMHG | TEMPERATURE: 95.4 F | BODY MASS INDEX: 35.9 KG/M2

## 2025-05-28 DIAGNOSIS — R06.00 DYSPNEA, UNSPECIFIED TYPE: ICD-10-CM

## 2025-05-28 DIAGNOSIS — J96.11 CHRONIC HYPOXIC RESPIRATORY FAILURE: ICD-10-CM

## 2025-05-28 DIAGNOSIS — J44.9 CHRONIC OBSTRUCTIVE PULMONARY DISEASE, UNSPECIFIED COPD TYPE (MULTI): Primary | ICD-10-CM

## 2025-05-28 PROCEDURE — 3078F DIAST BP <80 MM HG: CPT | Performed by: NURSE PRACTITIONER

## 2025-05-28 PROCEDURE — 99214 OFFICE O/P EST MOD 30 MIN: CPT | Performed by: NURSE PRACTITIONER

## 2025-05-28 PROCEDURE — 3075F SYST BP GE 130 - 139MM HG: CPT | Performed by: NURSE PRACTITIONER

## 2025-05-28 PROCEDURE — 1159F MED LIST DOCD IN RCRD: CPT | Performed by: NURSE PRACTITIONER

## 2025-05-28 PROCEDURE — 1126F AMNT PAIN NOTED NONE PRSNT: CPT | Performed by: NURSE PRACTITIONER

## 2025-05-28 PROCEDURE — 4010F ACE/ARB THERAPY RXD/TAKEN: CPT | Performed by: NURSE PRACTITIONER

## 2025-05-28 PROCEDURE — 1036F TOBACCO NON-USER: CPT | Performed by: NURSE PRACTITIONER

## 2025-05-28 RX ORDER — TADALAFIL 20 MG/1
20 TABLET ORAL DAILY PRN
Qty: 30 TABLET | Refills: 3 | Status: SHIPPED | OUTPATIENT
Start: 2025-05-28

## 2025-05-28 ASSESSMENT — ENCOUNTER SYMPTOMS
PALPITATIONS: 0
DIZZINESS: 0
NUMBNESS: 0
WEAKNESS: 0
DIARRHEA: 0
AGITATION: 0
SINUS PRESSURE: 0
JOINT SWELLING: 0
FEVER: 0
VOICE CHANGE: 0
ABDOMINAL PAIN: 0
VOMITING: 0
NERVOUS/ANXIOUS: 0
RHINORRHEA: 0
FATIGUE: 0
MYALGIAS: 0
HEADACHES: 0
EYE PAIN: 0
ARTHRALGIAS: 0
NAUSEA: 0
BACK PAIN: 0

## 2025-05-28 ASSESSMENT — PAIN SCALES - GENERAL: PAINLEVEL_OUTOF10: 0-NO PAIN

## 2025-05-28 NOTE — PATIENT INSTRUCTIONS
1. COPD: Previous PFTs with severe, but now improved to moderate obstruction. Dyspnea on exertion much improved. 2019 6MWT - 89% on RA with ambulation. Echo with normal EF - RV slightly enlarged. Started on 2L nocturnal oximetry. Qtc 361.   - continue trelegy - 200- 1 inhalation daily   - continue albuterol HFA 2 puffs or albuterol nebulizers every 4-6 hours as needed   - heart healthy diet   - continue to great job getting active!!   - discussed pulmonary rehab-- referral placed- to start soon   - get pulse ox to check at home -- can check with moving around   -  will email Dr. Hayes about azithromycin daily --> can discuss at follow up given recent trelegy dose increase and to start pulmonary rehab soon.     2. Chronic hypoxic respiratory failure: nocturnal oximetry showed he needs 2L at night. O2 desat today shows he needs 4l with exertion   - continue 3L at rest and 4L with exertion    3. Lung cancer: CT in 3/2019 with no evidence of reoccurrence. CT in 4/2021 with new ASHLEY nodule + adenocarcinoma s/p SBRT in 8/2021.   - continue to monitor    4.  CHAPA: likely related to COPD above. Echo with normal EF -- some new diastolic dysfunction. Seen by Dr. Hayes.     5. Bladder cancer: completed a year of chemo- upcoming appt with Dr. Frausto     Thank you for visiting the Pulmonary clinic today!   Return to clinic 2-3 months or sooner if needed - can be virtual   Suly Maria CNP  My office -  [phone]- Ti is my . Pallavi is my nurse [phone].   Radiology scheduling [phone]   Appointment scheduling [phone]   Pulmonary function testing - [phone]

## 2025-05-28 NOTE — PROGRESS NOTES
Patient: Ravin Camacho    [id number]  : 1955 -- AGE 70 y.o.    Provider: JAYME Maldonado-CNP     Location [facility]   Service Date: 2025              [facility]  Follow up visit note      HISTORY OF PRESENT ILLNESS       HISTORY OF PRESENT ILLNESS   Mr. Camacho is a 70 year old male former smoker with COPD and h/o of lung cancer (s/p RUL lobectomy in ) who presents today for follow-up. Last seen in clinic on 21 for a prebronch visit - 21 reoccurrence of adenocarcinoma s/p SBRT in 2021. Last seen in clinic on 10/10/24.     Since last visit he has been continuing to have issues with dyspnea. He has been using his trelegy daily,  albuterol HFA here and there (before bed/ activity), and albuterol nebulizers here and there. He was using his nebulizers daily for a while and then stopped it - did not notice worsening symptoms. He states since he didn't notice a difference he has not been using it as often.  He has an appt with getting in touch with pulmonary rehab - upcoming appt to go to [place]. He had his trelegy dose to 200 - he has only been on it a few days, but hard to tell if its helping.    He has been using 3-L at rest and 4L with exertion. He denies bothersome cough. He has had some slight wheezing. He has CHAPA with walking room to room at home - about the same as previous.  He denies any SOB at rest, CP, or GERD. He has some slight nasal congestion in the morning/ runny nose.  He states not overly bothersome.     10/10/24: Since last visit he has been doing about the same. He felt the chemo really worsened the breathing and he has not recovered yet. He has finished his chemo. He is having a procedure coming up [place]? -- scraping out the inside of the prostate. He has been using his trelegy daily ($160 a month). He has been using his albuterol HFA 4-6x a day and albuterol nebulizers daily in the morning. 86% on 3L --  he has his POC, but he was on our oxygen. 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 He has a slight cough - little bit of plleghm in the morning. He will notice wheezing at times. He denies any SOB at rest or CP.  He denies any GERD or allergies. He has been checking his pulse ox at home - 94%. He has been wearing 3L at home.    CAT today 24 8/1/24: Since last visit he has had issued with his chemo for his bladder cancer. He continues to use his Trelegy daily and PRN albuterol 4-5 x a day. He states after his 11th chemo in May - he has had issues since. He sees him a week from today -- if cancer shows back up they are going to remove his bladder. His chemo goes into his bladder. He states after his chemo he felt he had a chills, fatigue, decreased appetite, and shortness of breath. He states most of these symptoms resolved after a day or two except for the shortness of breath. He states his breathing has never returned back to where it was. He did do the nocturnal oximetry. He had to be on 2L lying flat for his most recent CAT scan.  He feels the 2L at night has been helpful.  He has an occasional cough -- [place] post nasal drip in the morning. He does notice some wheezing.  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 He has SOB with lying flat, otherwise does not have SOB at rest. He states his SOB with lying back has been worse over the last 2 months. He denies any allergies, GERD, or chest pain. He has decreased appetite and early satiety- has not been eating as much.    CAT today 25 1/25/24: Since last visit he has been doing ok. He was able to get the trelegy - not sure if its working well. He has been using his rescue 3-4 x per day - usually before exercise/ before bed/ waking up in the morning.  He was able to get the nebulizer machine, but does not have the solution to go in it. He feels his breathing feels about the same. He has a clearing cough every so often. He will bring up mucous in the morning -  usually clear. He has noticed some intermittent wheezing. He feels his HCAPA is about the same. He denies any SOB at rest, GERD, or CP. He has some post nasal drip and sneezing for a few days - felt like maybe a slight allergy to something. He did not feel it was a cold. Chemo for bladder cancer - done in June. He was following with Dr. Holder with rad/onc - recently left . He has an upcoming appt with Dr. Bowman. He has not been watching his pulse ox - does not have one at home. He states his wife states he only snores when he is on his back. He has not had a sleep study and states he sleeps fine.   CAT today 16    10/19/23: He states things have been going pretty crappy. He has had bladder cancer twice now over the last year and a half. Currently on chemo for this. He has been using his Anoro daily - not sure how much it is helping. He has spinal stenosis so he can't play golf anymore.  He is asking about pulmonary rehab. He is using his albuterol HFA 3-4 x a day.  He does not have a nebulizer machine. He has been on chemo for 3-4 months - he is not sure if they could have side effects. He has a slight cough - sometimes productive with clear mucous (usually more productive first thing in the morning). He will notice wheezing at times - notices when he lays down. He has CHAPA with projects around the house. He states he will work for 3-4 minutes and then take a 5 minute break. He feels he recovers quick, but has to take frequent breaks. He denies any SOB at rest, GERD, or CP.  He denies any runny nose/ nasal congestion -- maybe some very mild throat clearing, but not botheresome. He got his flu shot, RSV shot, and COVID booster last week.    CAT today 16 // ACT today 14     7/5/23: Since last visit he feels his breathing is worse. He is still using his anoro daily - states if he gets sick he ends up with a bad cough for a long time. He has CHAPA with going up the stairs/ taking the garbage out. He states he wishes the  anoro worked better. He tried spiriva -- states the anoro was better. He will at times notice wheezing. He has PRN albuterol that he will use 2-3 x a day. He uses his albuterol more proactively before exercise. He denies cough except when he is sick. He denies any SOB at rest, allergies, GERD, or chest pain.   CAT today 14     5/20/21: Since last visit he started spiriva started over the last few weeks. He felt the anoro may have worked a little better, but he wants to give it a little more time. He has a little bit of a cough in the morning. He has wheezing sporadically more so when he is lying down. CHAPA is still doing better than previous. He feels he back slide a little with the inhaler switch   He denies any SOB at rest, allergies, GERD, CP, or snoring.     4/14/21: Since last visit his breathing has been doing better with weight loss. He is not sure if the anoro is helping. He usually uses his rescue before exercise, but rarely needs it PRN. His CHAPA with exertion is much better compared to previous. He has lost 45lbs in the last 7-8 months. He previously had CHAPA with going up steps, but now is able to walk a mile before he get SOB. He has a dry cough intermittently that is rarely productive. He had wheezing previously that was especially bothersome to his wife at night. He denies any SOB at rest, allergies, GERD, CP, or snoring.     12/3/18: Mr. Camacho reports a URI in October 2018 for which he was given a Z-pack. He has been having CHAPA for 1-2 years now.  denies fevers, chills, or nights sweats  +CHAPA  denies shortness of breath at rest,   mMRC Dyspnea Score = 2  +Cough, occasional wheeze  denies hemoptysis, or stridor  denies chest pain, orthopnea, or lower extremity edema  +weight gain  denies nausea, vomiting, abdominal pain, odynophagia, dysphagia, heartburn, anorexia, or weight loss  denies sinus congestion, post-nasal drip, epistaxis, or hoarseness of voice  denies snoring, apneas, excessive daytime  somnolence, napping    Inhalers / Nebulized medications / Oxygen:    Anoro   Albuterol    Since the last visit there have been no changes to the past medical history, past surgical history, social history, or family history.      ALLERGIES AND MEDICATIONS     ALLERGIES  No Known Allergies    MEDICATIONS  Current Outpatient Medications   Medication Sig Dispense Refill    albuterol 2.5 mg /3 mL (0.083 %) nebulizer solution TAKE 3 ML (2.5 MG) BY NEBULIZATION EVERY 6 HOURS IF NEEDED FOR WHEEZING OR SHORTNESS OF BREATH. 75 mL 5    albuterol 90 mcg/actuation inhaler INHALE 2 PUFFS BY MOUTH EVERY 4 TO 6 HOURS AS NEEDED 18 g 11    atorvastatin (Lipitor) 20 mg tablet TAKE 1 TABLET BY MOUTH EVERY DAY 90 tablet 3    blood sugar diagnostic (Blood Glucose Test) strip 1 strip by in vitro route once daily.      dietary supplement capsule Take 3 capsules by mouth. SPIRULINA      dietary supplement capsule Take 1 capsule by mouth once daily. KRE-ALKALYN      dietary supplement capsule Take 1 capsule by mouth once daily. PROBIOTICS      ergocalciferol (Vitamin D-2) 1.25 MG (13140 UT) capsule Take 1 capsule (1,250 mcg) by mouth 1 (one) time per week. SATURDAY 12 capsule 2    fluticasone-umeclidin-vilanter (Trelegy Ellipta) 200-62.5-25 mcg blister with device Inhale 1 puff once daily in the morning. Take before meals. 60 each 11    Fluzone High-Dose Triv 24-25 syringe       Jardiance 10 mg tablet TAKE 1 TABLET BY MOUTH EVERY DAY 30 tablet 2    lancets (Fingerstix Lancets) misc once daily.      losartan (Cozaar) 25 mg tablet TAKE 1 TABLET BY MOUTH EVERY  tablet 2    meloxicam (Mobic) 15 mg tablet TAKE 1 TABLET EVERY DAY AS NEEDED 90 tablet 1    metFORMIN  mg 24 hr tablet Take 2 tablets (1,000 mg) by mouth 2 times a day. 360 tablet 3    multivitamin with minerals (multivitamin with folic acid) tablet Take 1 tablet by mouth once daily.      oxygen (O2) gas therapy Inhale 1 each continuously.      Spikevax [id number],12y  up,,PF, 50 mcg/0.5 mL syringe       tadalafil 20 mg tablet Take 1 tablet (20 mg) by mouth once daily as needed for erectile dysfunction. 30 tablet 3     No current facility-administered medications for this visit.         PAST HISTORY     PAST MEDICAL HISTORY  - HLD   - HTN   - lung cancer -  (s/p RUL lobectomy in 1999) who presents today for follow-up. Reoccurrence of adenocarcinoma s/p SBRT in 8/2021.  - DMII   - OA   - bladder cancer - s/p surgery in 3/2022- then s/p 6 weeks of BCG then quarterly. Kidney stone s/p surgery 3/2023- this delayed his BCG. He thinks it reoccurred related to delayed BCG -- currently on chemo - gemcitabine/ docetaxel (instilled into bladder).       PAST SURGICAL HISTORY  Past Surgical History:   Procedure Laterality Date    BLADDER SURGERY      TURBT MARCH 2023    COLONOSCOPY  12/01/2016    Complete Colonoscopy    HAND SURGERY  12/01/2016    Hand Surgery                                                                                                                                                          KIDNEY STONE SURGERY      May 2022    KNEE ARTHROPLASTY      right knee    LUNG LOBECTOMY  05/22/2015    Lung Lobectomy    OTHER SURGICAL HISTORY  10/31/2019    Knee replacement    OTHER SURGICAL HISTORY  09/18/2022    Epidural steroid injection    OTHER SURGICAL HISTORY  09/18/2022    Bronchoscopy       IMMUNIZATION HISTORY  Immunization History   Administered Date(s) Administered    COVID-19, mRNA, LNP-S, PF, 30 mcg/0.3 mL dose 03/03/2021, 03/24/2021, 10/04/2021    Flu vaccine, quadrivalent, high-dose, preservative free, age 65y+ (FLUZONE) 10/26/2022    Flu vaccine, trivalent, preservative free, HIGH-DOSE, age 65y+ (Fluzone) 09/23/2020, 09/29/2021, 08/29/2024    Influenza, Seasonal, Quadrivalent, Adjuvanted 10/12/2023    Influenza, seasonal, injectable 10/12/2023    Moderna COVID-19 vaccine, 12 years and older (50mcg/0.5mL)(Spikevax) 10/12/2023, 08/29/2024    Moderna SARS-CoV-2  Vaccination 10/12/2023, 08/29/2024, 05/26/2025    Pfizer COVID-19 vaccine, bivalent, age 12 years and older (30 mcg/0.3 mL) 10/26/2022    Pneumococcal conjugate vaccine, 13-valent (PREVNAR 13) 09/23/2020    Pneumococcal conjugate vaccine, 20-valent (PREVNAR 20) 07/19/2023    Pneumococcal polysaccharide vaccine, 23-valent, age 2 years and older (PNEUMOVAX 23) 12/03/2018    RSV, 60 Years And Older (AREXVY) 10/12/2023    Tdap vaccine, age 7 year and older (BOOSTRIX, ADACEL) 04/23/2015    Zoster vaccine, recombinant, adult (SHINGRIX) 06/28/2021, 10/18/2021    Zoster, live 04/26/2015       SOCIAL HISTORY / OCCUPATIONAL/ENVIRONMENTAL HISTORY   Tobacco: Former smoker > quit 1999, smoked 2 ppd prior since age 16 (~45 pack years)   Occupation: former  / EMT,       FAMILY HISTORY  Family History   Problem Relation Name Age of Onset    Hypertension Mother      Lung cancer Father      No Known Problems Sister      No Known Problems Sister      No Known Problems Brother      No Known Problems Brother      Cancer Other Multiple Family Members       +cancer   No family history of lung diseases    RESULTS/DATA     Pulmonary Function Test Results       - 11/18/2015 - FEV1/FVC = 45% (LLN= 66%) FEV1 1.48 L (41% predicted)/ FVC 3.65 (81%)   - 2/26/19 - FEV1/FVC 0.48/ FEV1 1.65 (48%)/ FVC 3.65 (81%)  - 4/14/21: FEV1/FVC 0.43/ FEV1 1.75 (52%)/ FVC 4.09 (93%)/ TLC 6.79 (95%)/ DLCO 74%   - 10/19/23 - FEV1/FVC 0.36/FEV1 1.24 (38%)/ FVC 3.45 (80%)/ TLC 6.70 (94%)/ DLCO 59%     6MWT:    - 2/26/19 - 515m 94 -> 87% on RA, PADMA 2   - 10/19/23 - 351m 95 -> 89% on RA, PADMA 4     O2 desat:   - 2/26/19 95% on RA at rest -> 88% on RA with exertion, 96% on 2L with exertion   - 8/1/24 - 92-> 86% on RA, 94% on 2L at rest to -> 87% with exertion. 96% on 4L at rest -> 91% with exertion     Chest Radiograph     No results found for this or any previous visit from the past 2000 days.      Chest CT Scan     Multiple previous     -- 8/30/22-  Postoperative changes compatible with right upper lobectomy and post radiation changes within the left upper lobe without evidence of local recurrence. Interval complete resolution/resection of the posterior bladder  mass. Interval development of a solid 2 mm right middle lobe and a solid 3 mm left lower lobe pulmonary nodules. Attention on follow-up is recommended. Pancreatic head and uncinate process cystic lesions are unchanged  compared to CT chest 02/07/2022. A pancreatic MRI with MRCP is recommended for further characterization. Nonspecific left periaortic lymphadenopathy without significant metabolic activity on prior PET-CT. Attention on follow-up is recommended. Unchanged left adrenal gland nodules without significant metabolic activity on prior PET-CT. Attention on follow-up is recommended. Bilateral nonobstructing nephrolithiasis. Prostatomegaly. Correlate with serum PSA.  --- 2/27/23 - A few mediastinal lymph nodes have enlarged compared to 6 months ago, the dominant node from 8 mm to 11 mm short axis in the subcarinal region. Metastatic disease is possible. Consider PET/CT for further evaluation. Previously described new pulmonary nodules have resolved. No new suspicious pulmonary nodules.  --- 6/5/23 - Status post right pneumonectomy. [ RUL lobectomy ]. Bilateral lung micronodules, stable since 2/23. Nonobstructing right renal calculus.  --12/18/23 -  -- 8/16/24: No evidence of an acute intrathoracic process. Unchanged findings as  above.  -- 2/20/25 - Underlying smoking-related lung disease. Prior resection of the right upper lobe without evidence for local disease recurrence.  Stable right pleural thickening with coarse calcification. A central  nodular density with somewhat oblique orientation and associated  airway obliteration with contiguous bandlike linear densities and  mild left upper lobe volume loss is 1.8 cm in diameter, stable since  the immediate prior and was 1.0 cm 06/05/2023. Per chart  review, the  patient has had prior radiation. This likely reflects post radiation  change/granulation tissue, but the possibility of viable tumor can  not be excluded on the basis of noncontrast CT. No significant FDG  avidity noted on prior PET CT 06/21/2023. Follow-up PET CT may be  obtained as clinically appropriate to exclude significant metabolic  activity, versus close ongoing surveillance.    PET:   - 5/14/21 - Noncalcified, paramediastinal pulmonary lesion in the ASHLEY demonstrates abnormal hypermetabolic activity and is highly concerning for a neoplastic process. No evidence of hypermetabolic mediastinal lymphadenopathy or  metastatic disease in the rest of body. Postsurgical changes consistent with right upper lobectomy, with  no evidence of hypermetabolic recurrent disease along the surgical margins. Hypermetabolic soft tissue focus in the left parotid gland, which may represent a Warthin's tumor. Ultrasound is recommended for further characterization.  - 6/21/23 -Postsurgical changes in the right upper lobe without PET evidence of recurrent disease. Redemonstration of faint FDG avid soft tissue thickening in the left upper lobe, favored to represent post radiation changes. No PET  evidence of local recurrence. No PET evidence of locoregional zita or distant metastasis. FDG avid focus in the posterior apical prostate gland, nonspecific, correlate clinically. 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 Post radiation change seen in the left lung apex with slightly more coalescent soft tissue possibly representing radiation fibrosis but progressive tumor is also consideration.. Follow-up is advised to exclude any local progression of disease. PET-CT may be of diagnostic benefit to evaluate vascular calcification.        Echocardiogram     Echocardiogram -  8/11/23 -   CONCLUSIONS:  1. Left ventricular systolic function is normal with a 65-70% estimated ejection fraction.  RA normal size. The right ventricle is mildly enlarged - There is normal right ventricular global systolic function.    8/9/24: Left ventricular ejection fraction is normal, calculated by Person's biplane at 65%.   2. Spectral Doppler shows an impaired relaxation pattern of left ventricular diastolic filling.   3. There is normal right ventricular global systolic function.  RA normal size - Mildly enlarged right ventricle - normal RV systolic function      EKG - 10/10/24 - NSR - anterior infarct - abnormal ECG. Questionable change in initial forces of anterior leads.  Qtc 361     REVIEW OF SYSTEMS     REVIEW OF SYSTEMS  Review of Systems   Constitutional:  Negative for fatigue and fever.   HENT:  Negative for congestion, postnasal drip, rhinorrhea, sinus pressure and voice change.    Eyes:  Negative for pain and visual disturbance.   Cardiovascular:  Negative for chest pain, palpitations and leg swelling.   Gastrointestinal:  Negative for abdominal pain, diarrhea, nausea and vomiting.   Endocrine: Negative for cold intolerance and heat intolerance.   Musculoskeletal:  Negative for arthralgias, back pain, joint swelling and myalgias.   Skin:  Negative for rash.   Neurological:  Negative for dizziness, weakness, numbness and headaches.   Psychiatric/Behavioral:  Negative for agitation. The patient is not nervous/anxious.          PHYSICAL EXAM     VITAL SIGNS: There were no vitals taken for this visit.     CURRENT WEIGHT: [unfilled]  BMI: [unfilled]  PREVIOUS WEIGHTS:  Wt Readings from Last 3 Encounters:   05/22/25 111 kg (245 lb)   03/12/25 111 kg (245 lb)   12/04/24 108 kg (239 lb)       Physical Exam  Vitals reviewed.   Constitutional:       General: He is not in acute distress.     Appearance: Normal appearance. He is not ill-appearing or toxic-appearing.   HENT:      Head: Normocephalic.      Nose: No  rhinorrhea.   Cardiovascular:      Rate and Rhythm: Normal rate and regular rhythm.      Heart sounds: Normal heart sounds.   Pulmonary:      Effort: Pulmonary effort is normal. No respiratory distress.      Breath sounds: Normal breath sounds. No stridor. No wheezing, rhonchi or rales.   Abdominal:      General: Abdomen is flat.   Musculoskeletal:         General: Normal range of motion.      Right lower leg: No edema.      Left lower leg: No edema.   Skin:     General: Skin is warm and dry.      Nails: There is no clubbing.   Neurological:      General: No focal deficit present.      Mental Status: He is alert and oriented to person, place, and time.   Psychiatric:         Mood and Affect: Mood normal.         Behavior: Behavior normal.         Judgment: Judgment normal.         ASSESSMENT/PLAN     1. COPD: Previous PFTs with severe, but now improved to moderate obstruction. Dyspnea on exertion much improved. 2019 6MWT - 89% on RA with ambulation. Echo with normal EF - RV slightly enlarged. Started on 2L nocturnal oximetry. Qtc 361.   - continue trelegy - 200- 1 inhalation daily   - continue albuterol HFA 2 puffs or albuterol nebulizers every 4-6 hours as needed   - heart healthy diet   - continue to great job getting active!!   - discussed pulmonary rehab-- referral placed- to start soon   - get pulse ox to check at home -- can check with moving around   -  will email Dr. Hayes about azithromycin daily --> can discuss at follow up given recent trelegy dose increase and to start pulmonary rehab soon.     2. Chronic hypoxic respiratory failure: nocturnal oximetry showed he needs 2L at night. O2 desat today shows he needs 4l with exertion   - continue 3L at rest and 4L with exertion    3. Lung cancer: CT in 3/2019 with no evidence of reoccurrence. CT in 4/2021 with new ASHLEY nodule + adenocarcinoma s/p SBRT in 8/2021.   - continue to monitor    4.  CHAPA: likely related to COPD above. Echo with normal EF -- some new  diastolic dysfunction. Seen by Dr. Hayes.     5. Bladder cancer: completed a year of chemo- upcoming appt with Dr. Frausto     Thank you for visiting the Pulmonary clinic today!   Return to clinic 2-3 months or sooner if needed   Suly Maria CNP  My office -  [phone]- Ti is my . Pallavi is my nurse [phone].   Radiology scheduling [phone]   Appointment scheduling [phone]   Pulmonary function testing - [phone]

## 2025-05-30 ENCOUNTER — APPOINTMENT (OUTPATIENT)
Dept: UROLOGY | Facility: CLINIC | Age: 70
End: 2025-05-30
Payer: MEDICARE

## 2025-06-01 VITALS
HEIGHT: 70 IN | RESPIRATION RATE: 18 BRPM | SYSTOLIC BLOOD PRESSURE: 124 MMHG | DIASTOLIC BLOOD PRESSURE: 70 MMHG | BODY MASS INDEX: 35.07 KG/M2 | OXYGEN SATURATION: 93 % | WEIGHT: 245 LBS | HEART RATE: 74 BPM

## 2025-06-01 PROBLEM — Z85.118 HISTORY OF LUNG CANCER: Status: ACTIVE | Noted: 2025-06-01

## 2025-06-01 PROBLEM — E66.01 OBESITY, MORBID (MULTI): Status: ACTIVE | Noted: 2023-09-02

## 2025-06-01 PROBLEM — Z90.2 HISTORY OF LOBECTOMY OF LUNG: Status: ACTIVE | Noted: 2025-06-01

## 2025-06-01 ASSESSMENT — ENCOUNTER SYMPTOMS
ABDOMINAL PAIN: 0
FEVER: 0
DYSURIA: 0
UNEXPECTED WEIGHT CHANGE: 0
POLYPHAGIA: 0
SHORTNESS OF BREATH: 1
POLYDIPSIA: 0
HEMATURIA: 0
DIFFICULTY URINATING: 0
FREQUENCY: 1
FATIGUE: 1
CARDIOVASCULAR NEGATIVE: 1

## 2025-06-03 ENCOUNTER — APPOINTMENT (OUTPATIENT)
Dept: PHARMACY | Facility: HOSPITAL | Age: 70
End: 2025-06-03
Payer: MEDICARE

## 2025-06-03 DIAGNOSIS — J42 CHRONIC BRONCHITIS, UNSPECIFIED CHRONIC BRONCHITIS TYPE (MULTI): ICD-10-CM

## 2025-06-03 DIAGNOSIS — E11.9 DIABETES MELLITUS TYPE 2 WITHOUT RETINOPATHY (MULTI): Primary | ICD-10-CM

## 2025-06-03 NOTE — ASSESSMENT & PLAN NOTE
Patient with COPD that is stable and improved. Pt saw pulmonology and scheduled pulm rehab. Pt tolerated increase in Trelegy (100->200). Pt has been using less albuterol since last visit with pulmonology and using trelegy. Pt to continue close FU with pulm rehab and notify clinical pharmacy if any issues arise with medications.    Medication Changes:  CONTINUE  Trelegy Ellipta 200-62.5-25 - 1 puff daily  O2 therapy  Albuterol PRN

## 2025-06-03 NOTE — PROGRESS NOTES
Clinical Pharmacy Appointment    Patient ID: Ravin Camacho is a 70 y.o. male who presents for Diabetes and COPD.    Pt is here for Follow Up appointment.     Referring Provider: Jose Antonio Prado MD  PCP: Jose Antonio Prado MD   Last visit with PCP: 3/12/2025   Next visit with PCP: 5/22/2025    Subjective   Medication Reconciliation:  Changed:   Added:   Discontinued: Farxiga    Drug Interactions  No relevant drug interactions were noted.    Medication System Management  Patient's preferred pharmacy: [facility]  Adherence/Organization: adherent to meds  Affordability/Accessibility: does not meet  PAP (income)      HPI  PULMONARY ASSESSMENT  Patient has been diagnosed with: COPD  Does patient see pulmonology: Yes    Date: 10/10/2024  has had PFT's completed in last 2 years    Current Regimen  Trelegy Ellipta 200-62.5-25 - 1 puff daily  O2 therapy  Albuterol PRN    Clarifications to above regimen: has been using less of albuterol  Adverse Effects: NA   Appropriate technique? Yes    Historical Treatment  NA    Symptom Management  Current symptoms: NA  Triggers: NA  Alleviating factors: NA  Exercise capacity: limited, limited d/t dyspnea  How often do you use your rescue inhaler? 2-3x week  mMRC score: 4      Exacerbation Hx  When was your last hospitalization for an exacerbation? NA  When was the last time you were treated with antibiotics and/or steroids? NA   Total number of hospitalizations d/t exacerbation: NA    Immunization History: reports up to date but not sure when.   Influenza: Date [NA]  PCV13: Date [NA]  PPSV23: Date [NA]  PCV20: Date [NA]  COVID: Date [NA]  RSV: Date [NA]    Smoking History  He has never smoked.    DIABETES MELLITUS TYPE 2:    Does patient follow with Endocrinology: No  Last optometry exam: 4/9/25  Most recent visit in Podiatry: NA-- patient denies sores or cuts on feet today      Current diabetic medications include:  Jardiance 10 mg daily  Metformin XR 1000mg BID    Clarifications to  above regimen: NA  Adverse Effects: NA    Past diabetic medications include:      Glucose Readings:  Glucometer/CGM Type: NA  Patient tests BG 0 times per day    Current home BG readings (mg/dL): NA   Previous home BG readings (mg/dL): NA    Any episodes of hypoglycemia? N/A.  Did patient treat episode of hypoglycemia appropriately? N/A  Does the patient have a prescription for ready-to-use Glucagon? Not on insulin    Lifestyle:  Diet: 2-3 meals/day. NA  Exercise: does not exercise  Activity type: Type of Exercise : NA  Is limited by: dyspnea  Tobacco history: None    Secondary Prevention:  Statin? Yes  ACE-I/ARB? Yes  Aspirin? No    Pertinent PMH Review:  PMH of Pancreatitis: No  PMH of Retinopathy: No  PMH of Urinary Tract Infections: No  PMH of MTC: No  PMH of MEN2: No  UACR/EGFR in last year?: No  Albumin/Creatine Ratio   Date Value Ref Range Status   10/28/2021 86.9 (H) 0.0 - 30.0 ug/mg crt Final   05/12/2020 233.3 (H) 0.0 - 30.0 ug/mg crt Final       Objective   No Known Allergies  Social History     Social History Narrative    Not on file      Medication Review  Current Outpatient Medications   Medication Instructions    albuterol 90 mcg/actuation inhaler 2 puffs, Every 4 hours PRN    albuterol 2.5 mg, nebulization, Every 6 hours PRN    atorvastatin (LIPITOR) 20 mg, oral, Daily    blood sugar diagnostic (Blood Glucose Test) strip 1 strip, Daily    dietary supplement capsule 3 capsules    dietary supplement capsule 1 capsule, Daily    ergocalciferol (VITAMIN D-2) 1,250 mcg, oral, Weekly, SATURDAY    fluticasone-umeclidin-vilanter (Trelegy Ellipta) 200-62.5-25 mcg blister with device 1 puff, inhalation, Daily before breakfast    Jardiance 10 mg, oral, Daily    lancets (Fingerstix Lancets) misc Daily    losartan (Cozaar) 25 mg tablet TAKE 1 TABLET BY MOUTH EVERY DAY    meloxicam (MOBIC) 15 mg, oral, Daily PRN    metFORMIN XR (GLUCOPHAGE-XR) 1,000 mg, oral, 2 times daily    multivitamin with minerals  (multivitamin with folic acid) tablet 1 tablet, Daily    oxygen (O2) gas therapy 1 each, Continuous    tadalafil 20 mg, oral, Daily PRN      Vitals  BP Readings from Last 2 Encounters:   05/28/25 134/75   05/22/25 124/70     BMI Readings from Last 1 Encounters:   05/28/25 35.90 kg/m²      Labs  A1C  Lab Results   Component Value Date    HGBA1C 6.4 (H) 04/23/2025    HGBA1C 6.3 (H) 11/25/2024    HGBA1C 5.9 (A) 08/11/2023     BMP  Lab Results   Component Value Date    CALCIUM 9.5 04/23/2025     04/23/2025    K 5.0 04/23/2025    CO2 35 (H) 04/23/2025     04/23/2025    BUN 19 04/23/2025    CREATININE 1.12 04/23/2025    EGFR 71 04/23/2025     LFTs  Lab Results   Component Value Date    ALT 17 04/23/2025    AST 15 04/23/2025    ALKPHOS 53 04/23/2025    BILITOT 0.6 04/23/2025     FLP  Lab Results   Component Value Date    TRIG 130 11/25/2024    CHOL 142 11/25/2024    LDLF 59 08/11/2023    LDLCALC 67 11/25/2024    HDL 49.0 11/25/2024     Urine Microalbumin  Lab Results   Component Value Date    MICROALBCREA 86.9 (H) 10/28/2021     Weight Management  Wt Readings from Last 3 Encounters:   05/28/25 113 kg (250 lb 3.6 oz)   05/22/25 111 kg (245 lb)   03/12/25 111 kg (245 lb)      There is no height or weight on file to calculate BMI.     Assessment/Plan   Problem List Items Addressed This Visit       Chronic obstructive pulmonary disease (Multi)    Patient with COPD that is stable and improved. Pt saw pulmonology and scheduled pulm rehab. Pt tolerated increase in Trelegy (100->200). Pt has been using less albuterol since last visit with pulmonology and using trelegy. Pt to continue close FU with pulm rehab and notify clinical pharmacy if any issues arise with medications.    Medication Changes:  CONTINUE  Trelegy Ellipta 200-62.5-25 - 1 puff daily  O2 therapy  Albuterol PRN         Diabetes mellitus type 2 without retinopathy (Multi) - Primary    Patient's goal A1c is < 7%.  Is pt at goal? Yes, 6.4  Patient's SMBGs  are NA.     Rationale for plan: pt has controlled A1c, however, slight increase seen from 6.3. Pt to continue current regimen and continue heart healthy diet.     Medication Changes:  CONTINUE  Jardiance 10 mg daily  Metformin XR 1000mg BID    Future Considerations:  Consider increasing Jardiance to 25 mg and decreasing metformin if indicated.                 Clinical Pharmacist follow-up: 12/3 11:20 am    Continue all meds under the continuation of care with the referring provider and clinical pharmacy team.    Thank you,  Dayami Carroll PharmD [facility]  Clinical Pharmacy Resident, Primary Care     Verbal consent to manage patient's drug therapy was obtained from the patient. They were informed they may decline to participate or withdraw from participation in pharmacy services at any time.

## 2025-06-03 NOTE — ASSESSMENT & PLAN NOTE
Patient's goal A1c is < 7%.  Is pt at goal? Yes, 6.4  Patient's SMBGs are NA.     Rationale for plan: pt has controlled A1c, however, slight increase seen from 6.3. Pt to continue current regimen and continue heart healthy diet.     Medication Changes:  CONTINUE  Jardiance 10 mg daily  Metformin XR 1000mg BID    Future Considerations:  Consider increasing Jardiance to 25 mg and decreasing metformin if indicated.

## 2025-06-19 NOTE — PROGRESS NOTES
HPI    70 y.o. male being seen with the following problem list:    Problem list:  T1 papillary urothelial carcinoma. Surveillance by Dr. Frausto   Non obstructing bilateral nephrolithiasis without hydronephrosis. Managed by Dr. Ambriz  Bothersome LUTS - S/p HoLEP + Botox 100U. Benign 10.5g 11/11/24     - Renal US on 4/18/24 demonstrated no bladder mass. Non obstructing bilateral nephrolithiasis without hydronephrosis. 1.4cm stone in the left kidney, will continue to follow with Dr. Ambriz for treatment on this.      5/14/24 CT abdomen/pelvis  1. Multiple bilateral nonobstructing renal calculi as detailed above.  No ureteral stones or hydroureteronephrosis identified.  2. No evidence of metastatic disease identified in the abdomen or pelvis.  3. Prostatomegaly. Correlate with serum PSA     3/29/24 microUA - neg blood in urine  3/29/24 Ucx - neg     5/3/24 microUA - neg blood in urine  5/3/24 Ucx- neg     5/31/24 microUA - neg blood in urine  5/31/24 Ucx - neg     10/09/24 - PVR 100cc. Bothersome LUTs like Weak stream, urgency and frequency. Has worsening COPD, is on oxygen therapy.       11/13/24 - Presents for TOV. Passed TOV     11/27/24 - PVR 19cc. Urinary symptoms somewhat improved, not a whole lot better. Still with nocturia x2-3, mild day time frequency, not bothersome. Emptying bladder well, no leakage. Stream is mostly clear.      01/22/25 - Seen today over telehealth, performed this visit using real-time telehealth tools, including an audio/video connection between Ravin Camacho at home and [facility]. Consent for telemedicine visit was obtained.   Urinary symptoms improved. He is able to sleep through the night mostly, nocturia 1x. Stream is somewhat better, minimal frequency. Erections work well on Cialis 20mg. He will be seeing Dr. Frausto for his bladder ca.      Cysto with Dr. Frausto on 4/4/25 with normal finding, at that time he denied any urinary symptoms.      06/25/25 - Seen today over telehealth, performed this visit using real-time telehealth tools, including an audio/video connection between Ravin Lópezholland at home and Ravin Navarro MD at [facility]. Consent for telemedicine visit was obtained.   Doing well, no urinary concerns at this point.       Lab Results   Component Value Date    PSA 3.64 08/11/2023              Current Medications:  Current Medications[1]     Active Problems:  Ravin Camacho is a 70 y.o. male with the following Problems and Medications.  Problem List[2]  Current Medications[3]    PMH:  Medical History[4]    PSH:  Surgical History[5]    FMH:  Family History[6]    SHx:  Social History[7]    Allergies:  RX Allergies[8]      Assessment/Plan  Doing well symptomatically, no issues at this point. He will continue to follow up with Dr. Frausto for his bladder ca and see me as needed     FU prn.     Scribe Attestation  By signing my name below, I, Thanhmariam Reid, [place], attest that this documentation has been prepared under the direction and in the presence of Ravin Navarro MD.           [1]   Current Outpatient Medications   Medication Sig Dispense Refill    albuterol 2.5 mg /3 mL (0.083 %) nebulizer solution TAKE 3 ML (2.5 MG) BY NEBULIZATION EVERY 6 HOURS IF NEEDED FOR WHEEZING OR SHORTNESS OF BREATH. 75 mL 5    albuterol 90 mcg/actuation inhaler INHALE 2 PUFFS BY MOUTH EVERY 4 TO 6 HOURS AS NEEDED 18 g 11    atorvastatin (Lipitor) 20 mg tablet TAKE 1 TABLET BY MOUTH EVERY DAY 90 tablet 3    blood sugar diagnostic (Blood Glucose Test) strip 1 strip by in vitro route once daily.      dietary supplement capsule Take 3 capsules by mouth. SPIRULINA      dietary supplement capsule Take 1 capsule by mouth once daily. KRE-ALKALYN      ergocalciferol (Vitamin D-2) 1.25 MG (47517 UT) capsule Take 1 capsule (1,250 mcg) by mouth 1 (one) time per week. SATURDAY 12 capsule 2    fluticasone-umeclidin-vilanter (Trelegy Ellipta) 200-62.5-25  mcg blister with device Inhale 1 puff once daily in the morning. Take before meals. 60 each 11    Jardiance 10 mg tablet TAKE 1 TABLET BY MOUTH EVERY DAY 30 tablet 2    lancets (Fingerstix Lancets) misc once daily.      losartan (Cozaar) 25 mg tablet TAKE 1 TABLET BY MOUTH EVERY  tablet 2    meloxicam (Mobic) 15 mg tablet TAKE 1 TABLET EVERY DAY AS NEEDED 90 tablet 1    metFORMIN  mg 24 hr tablet Take 2 tablets (1,000 mg) by mouth 2 times a day. 360 tablet 3    multivitamin with minerals (multivitamin with folic acid) tablet Take 1 tablet by mouth once daily.      oxygen (O2) gas therapy Inhale 1 each continuously.      tadalafil 20 mg tablet TAKE 1 TABLET (20 MG) BY MOUTH ONCE DAILY AS NEEDED FOR ERECTILE DYSFUNCTION. 30 tablet 3     No current facility-administered medications for this visit.   [2]   Patient Active Problem List  Diagnosis    Abnormal finding on MRI of brain    Acute otitis externa    Asymmetrical hearing loss    Benign essential hypertension    Malignant neoplasm of urinary bladder (Multi)    Mass of urinary bladder    Nephrolithiasis    Cervical radiculitis    Cluster headaches    Conjunctival concretion    Chronic obstructive pulmonary disease (Multi)    Dermatofibroma    Hyperglycemia    Diabetes mellitus type 2 without retinopathy (Multi)    Type 2 diabetes mellitus    Difficulty walking    Dysfunction of both eustachian tubes    Shortness of breath    Epididymitis    Erectile dysfunction    Erythrocytosis    Fatigue    Mixed conductive and sensorineural hearing loss    Hip pain    History of malignant neoplasm of thoracic cavity structure    Hyperlipidemia    Hyperopia of both eyes with astigmatism and presbyopia    Hyperopia    Lattice degeneration of left retina    Low HDL (under 40)    Vitamin D deficiency    Knee stiffness    Status post cystoscopy with ureteral stent placement    Primary localized osteoarthritis    Peripheral nerve disease    Paresthesia    Obesity     Nuclear sclerosis    Benign neoplasm of soft tissue    Lump on finger    Malignant neoplasm of lung (Multi)    Decreased testosterone level    Spinal stenosis    Testicular mass    Lung nodule    Microscopic hematuria    History of colonic polyps    Weak urinary stream    Melanocytic nevi of trunk    Neoplasm of unspecified behavior of bone, soft tissue, and skin    Other specified congenital malformations of skin    Chest discomfort    Obesity, morbid (Multi)    Healthcare maintenance    Benign prostatic hyperplasia with urinary frequency    OAB (overactive bladder)    Enlarged prostate with urinary obstruction    Nocturia    Combined forms of age-related cataract of both eyes    History of lung cancer    History of lobectomy of lung   [3]   Current Outpatient Medications   Medication Sig Dispense Refill    albuterol 2.5 mg /3 mL (0.083 %) nebulizer solution TAKE 3 ML (2.5 MG) BY NEBULIZATION EVERY 6 HOURS IF NEEDED FOR WHEEZING OR SHORTNESS OF BREATH. 75 mL 5    albuterol 90 mcg/actuation inhaler INHALE 2 PUFFS BY MOUTH EVERY 4 TO 6 HOURS AS NEEDED 18 g 11    atorvastatin (Lipitor) 20 mg tablet TAKE 1 TABLET BY MOUTH EVERY DAY 90 tablet 3    blood sugar diagnostic (Blood Glucose Test) strip 1 strip by in vitro route once daily.      dietary supplement capsule Take 3 capsules by mouth. SPIRULINA      dietary supplement capsule Take 1 capsule by mouth once daily. KRE-ALKALYN      ergocalciferol (Vitamin D-2) 1.25 MG (72958 UT) capsule Take 1 capsule (1,250 mcg) by mouth 1 (one) time per week. SATURDAY 12 capsule 2    fluticasone-umeclidin-vilanter (Trelegy Ellipta) 200-62.5-25 mcg blister with device Inhale 1 puff once daily in the morning. Take before meals. 60 each 11    Jardiance 10 mg tablet TAKE 1 TABLET BY MOUTH EVERY DAY 30 tablet 2    lancets (Fingerstix Lancets) misc once daily.      losartan (Cozaar) 25 mg tablet TAKE 1 TABLET BY MOUTH EVERY  tablet 2    meloxicam (Mobic) 15 mg tablet TAKE 1 TABLET  EVERY DAY AS NEEDED 90 tablet 1    metFORMIN  mg 24 hr tablet Take 2 tablets (1,000 mg) by mouth 2 times a day. 360 tablet 3    multivitamin with minerals (multivitamin with folic acid) tablet Take 1 tablet by mouth once daily.      oxygen (O2) gas therapy Inhale 1 each continuously.      tadalafil 20 mg tablet TAKE 1 TABLET (20 MG) BY MOUTH ONCE DAILY AS NEEDED FOR ERECTILE DYSFUNCTION. 30 tablet 3     No current facility-administered medications for this visit.   [4]   Past Medical History:  Diagnosis Date    Acute otitis media 04/05/2023    Bladder cancer (Multi)     S/P CHEMO COMPLETE    BPH (benign prostatic hyperplasia)     Cervical disc disease     CERVICAL RADICULITIS    Cluster headache 04/05/2023    COPD (chronic obstructive pulmonary disease) (Multi)     HOME O2 3LNC AT REST, 4LNC WITH EXERTION, DR. Echeverria TASKED FOR CLEARANCE    Di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    Lumbar disc disease     SPINAL STENOSIS    Lung cancer (Multi)     REOCCURANCE 4/2021, S/P LOBECTOMY 2015    On home O2     Personal history of irradiation 09/18/2022    History of radiation therapy    Pure hypercholesterolemia, unspecified     High cholesterol    Type 2 diabetes mellitus     A1C 5.9 8/11/23    Vitamin D deficiency     Wears dentures     LOWER   [5]   Past Surgical History:  Procedure Laterality Date    BLADDER SURGERY  3/1/2023    TURBT MARCH 2023    COLONOSCOPY  12/01/2016    Complete Colonoscopy    CYSTOSCOPY  Spring 2025    HAND SURGERY  12/01/2016    Hand Surgery                                                                                                                                                          KIDNEY STONE SURGERY  5/23/2022    May 2022    KNEE ARTHROPLASTY      right knee    LUNG LOBECTOMY  05/22/2015    Lung  Lobectomy    OTHER SURGICAL HISTORY  10/31/2019    Knee replacement    OTHER SURGICAL HISTORY  2022    Epidural steroid injection    OTHER SURGICAL HISTORY  2022    Bronchoscopy    VASECTOMY     [6]   Family History  Problem Relation Name Age of Onset    Hypertension Mother      Lung cancer Father      No Known Problems Sister      No Known Problems Sister      No Known Problems Brother      No Known Problems Brother      Cancer Other Multiple Family Members    [7]   Social History  Tobacco Use    Smoking status: Former     Current packs/day: 0.00     Types: Cigarettes     Quit date:      Years since quittin.4     Passive exposure: Past    Smokeless tobacco: Never   Vaping Use    Vaping status: Never Used   Substance Use Topics    Alcohol use: Not Currently     Alcohol/week: 1.0 standard drink of alcohol     Types: 1 Cans of beer per week    Drug use: Never   [8] No Known Allergies

## 2025-06-23 ENCOUNTER — CLINICAL SUPPORT (OUTPATIENT)
Dept: CARDIAC REHAB | Facility: CLINIC | Age: 70
End: 2025-06-23
Payer: MEDICARE

## 2025-06-23 VITALS
SYSTOLIC BLOOD PRESSURE: 110 MMHG | BODY MASS INDEX: 35.79 KG/M2 | HEIGHT: 70 IN | DIASTOLIC BLOOD PRESSURE: 60 MMHG | OXYGEN SATURATION: 94 % | WEIGHT: 250 LBS

## 2025-06-23 DIAGNOSIS — J44.9 CHRONIC OBSTRUCTIVE PULMONARY DISEASE, UNSPECIFIED COPD TYPE (MULTI): ICD-10-CM

## 2025-06-23 ASSESSMENT — PATIENT HEALTH QUESTIONNAIRE - PHQ9
2. FEELING DOWN, DEPRESSED OR HOPELESS: NOT AT ALL
7. TROUBLE CONCENTRATING ON THINGS, SUCH AS READING THE NEWSPAPER OR WATCHING TELEVISION: NOT AT ALL
SUM OF ALL RESPONSES TO PHQ9 QUESTIONS 1 & 2: 0
1. LITTLE INTEREST OR PLEASURE IN DOING THINGS: NOT AT ALL
SUM OF ALL RESPONSES TO PHQ QUESTIONS 1-9: 6
9. THOUGHTS THAT YOU WOULD BE BETTER OFF DEAD, OR OF HURTING YOURSELF: NOT AT ALL
SUM OF ALL RESPONSES TO PHQ QUESTIONS 1-9: 6
6. FEELING BAD ABOUT YOURSELF - OR THAT YOU ARE A FAILURE OR HAVE LET YOURSELF OR YOUR FAMILY DOWN: NOT AT ALL
4. FEELING TIRED OR HAVING LITTLE ENERGY: NEARLY EVERY DAY
8. MOVING OR SPEAKING SO SLOWLY THAT OTHER PEOPLE COULD HAVE NOTICED. OR THE OPPOSITE, BEING SO FIGETY OR RESTLESS THAT YOU HAVE BEEN MOVING AROUND A LOT MORE THAN USUAL: NOT AT ALL
3. TROUBLE FALLING OR STAYING ASLEEP OR SLEEPING TOO MUCH: NOT AT ALL
5. POOR APPETITE OR OVEREATING: NEARLY EVERY DAY

## 2025-06-23 NOTE — PROGRESS NOTES
FUV    Last seen - 4/4/25     HISTORY OF PRESENT ILLNESS:   Ravin Camacho is a 70 y.o. male who is being seen today for continued cystoscopic surveillance.  -T1 papillary urothelial carcinoma, high grade  -Pt has completed 12 months of maintenance of Gemcitabine/Docetaxel intravesical instillations by Charleen Sutton NP, last was on 6/4/24.   - 1.4cm stone in the left kidney, managed by Dr. Ambriz   -s/p HOLEP+ Botox 100U with Dr Navarro   PAST MEDICAL HISTORY:  Past Medical History:   Diagnosis Date    Acute otitis media 04/05/2023    Bladder cancer (Multi)     S/P CHEMO COMPLETE    BPH (benign prostatic hyperplasia)     Cervical disc disease     CERVICAL RADICULITIS    Cluster headache 04/05/2023    COPD (chronic obstructive pulmonary disease) (Multi)     HOME O2 3LNC AT REST, 4LNC WITH EXERTION, DR. Echeverria TASKED FOR CLEARANCE    Diastolic dysfunction     [facility], DR. ALVA APPT 12/4/24    Hip pain, right 04/05/2023    HL (hearing loss)     Hypertension     Kidney stone     1.4CM LEFT KIDNEY    Lattice degeneration of both retinas 04/05/2023    Lumbar disc disease     SPINAL STENOSIS    Lung cancer (Multi)     REOCCURANCE 4/2021, S/P LOBECTOMY 2015    On home O2     Personal history of irradiation 09/18/2022    History of radiation therapy    Pure hypercholesterolemia, unspecified     High cholesterol    Type 2 diabetes mellitus     A1C 5.9 8/11/23    Vitamin D deficiency     Wears dentures     LOWER       PAST SURGICAL HISTORY:  Past Surgical History:   Procedure Laterality Date    BLADDER SURGERY      TURBT MARCH 2023    COLONOSCOPY  12/01/2016    Complete Colonoscopy    HAND SURGERY  12/01/2016    Hand Surgery                                                                                                                                                          KIDNEY STONE SURGERY      May 2022    KNEE ARTHROPLASTY      right knee    LUNG LOBECTOMY  05/22/2015    Lung Lobectomy    OTHER SURGICAL HISTORY   "10/31/2019    Knee replacement    OTHER SURGICAL HISTORY  09/18/2022    Epidural steroid injection    OTHER SURGICAL HISTORY  09/18/2022    Bronchoscopy        ALLERGIES:   No Known Allergies     MEDICATIONS:   Current Outpatient Medications   Medication Instructions    albuterol 90 mcg/actuation inhaler 2 puffs, Every 4 hours PRN    albuterol 2.5 mg, nebulization, Every 6 hours PRN    atorvastatin (LIPITOR) 20 mg, oral, Daily    blood sugar diagnostic (Blood Glucose Test) strip 1 strip, Daily    dietary supplement capsule 3 capsules    dietary supplement capsule 1 capsule, Daily    ergocalciferol (VITAMIN D-2) 1,250 mcg, oral, Weekly, SATURDAY    fluticasone-umeclidin-vilanter (Trelegy Ellipta) 200-62.5-25 mcg blister with device 1 puff, inhalation, Daily before breakfast    Jardiance 10 mg, oral, Daily    lancets (Fingerstix Lancets) misc Daily    losartan (Cozaar) 25 mg tablet TAKE 1 TABLET BY MOUTH EVERY DAY    meloxicam (MOBIC) 15 mg, oral, Daily PRN    metFORMIN XR (GLUCOPHAGE-XR) 1,000 mg, oral, 2 times daily    multivitamin with minerals (multivitamin with folic acid) tablet 1 tablet, Daily    oxygen (O2) gas therapy 1 each, Continuous    tadalafil 20 mg, oral, Daily PRN        PHYSICAL EXAM:  There were no vitals taken for this visit.  Constitutional: Patient appears well-developed and well-nourished. No distress.    Pulmonary/Chest: Effort normal. No respiratory distress.   Abdominal: Soft, ND NT  : WNL  Musculoskeletal: Normal range of motion.    Neurological: Alert and oriented to person, place, and time.  Psychiatric: Normal mood and affect. Behavior is normal. Thought content normal.      Labs:  Lab Results   Component Value Date    TESTOSTERONE 220 (L) 11/25/2024     Lab Results   Component Value Date    PSA 3.64 08/11/2023     No components found for: \"CBC\"  Lab Results   Component Value Date    CREATININE 1.12 04/23/2025     No components found for: \"TESTOTMS\"  Lab Results   Component Value Date "    TESTF 64.3 10/28/2021     Imaging:  - Renal US on 4/18/24 demonstrated No bladder mass. Non obstructing bilateral nephrolithiasis without hydronephrosis. 1.4cm stone in the left kidney, will continue to follow with Dr. Ambriz for treatment on this.  -  - Results reviewed with patient. Patient reassured.      Discussion:  Doing well, no complaints. Denies any dysuria, hematuria, bothersome urinary frequency, urgency, or flank pain.  The cystoscopy was completed today due to T1 papillary urothelial carcinoma, high grade and demonstrated no tumors, stones or lesions. Cytology sent. 1 abx given to patient in clinic today. Patient instructed to follow up in 3 months for continued surveillance.    Cystoscopic surveillance schedule: q3 months until 3/2026, q6 until 3/2028, and yearly after no recurrences.  Assessment:      No diagnosis found.    T1 papillary urothelial carcinoma, high grade     Ravin Camacho is a 70 y.o. male here for FUV     Plan:   Will follow up in 3 months for continued cystoscopic surveillance.  Will get a renal US prior to next visit.   All questions and concerns were addressed. Patient verbalizes understanding and has no other questions at this time.     Scribe Attestation  By signing my name below, IFang Scribe   attest that this documentation has been prepared under the direction and in the presence of Mello Frausto MD.

## 2025-06-23 NOTE — PROGRESS NOTES
Pulmonary Rehabilitation Initial Treatment Plan    Name: Ravin Camacho  Medical Record Number: [id number]  YOB: 1955  Age: 70 y.o.    Today’s Date: 6/23/2025  Primary Care Physician: Jose Antonio Prado MD  Referring Physician: Trell Sanderson MD  Program Location: [address]     General  Primary Diagnosis: J44.9   Onset/Date of Diagnosis: 4/5/23    Initial Assessment, not yet started program.      Falls Risk: High  Psychosocial Assessment    Pre PHQ-9: 6      Sent PH-Q 9 to MD if score > 20:   PRE-PHQ-9- 6  POST-PHQ-9-    Stress Management    Pt reported/currently experiencing stress: No  Patient uses stress management skills: Pt does not have a stress management skills  History of: no history of anxiety or depression  Currently seeing a mental health provider: No  Social Support: Yes, Whom:Yes  Pre CAT score: 26  Post CAT score: Survey to be given at discharge.   Learning Assessment:  Learning assessment/barriers: None  Preferred learning method: Reading handout  Barriers: None  Comments:    Stages of Change:Preparation    Psychosocial Plan    Goal Status: Initial Assessment; goals not yet started    Psychosocial Goals: Demonstrating proper techniques for stress management and Maintain or lower PH-Q 9 score by discharge    Psychosocial Interventions/Education: To be done in pulmonary rehab      Initial Assessment: NA    Oxygen Assessment    SpO2 at rest: 94 %  SpO2 with exertion: 85 %    Oxygen Use    Supplemental O2 prescribed: Yes,   Liters at rest: 3-4 L  Liters with exertion: 4 L    SpO2 at rest: 94 %  SpO2 with exertion: 85 %    Using O2 as prescribed: Yes       Demonstrates appropriate knowledge of O2 use: Yes   Demonstrates appropriate knowledge of O2 safety: Yes     Home O2 System: Concentrator  Portable O2 System: Portable Oxygen Concentrator    Hypoxia managed: Yes   Home Pulse Oximeter: Yes     Pre MMRC: 4  Post MMRC: To be done at discharge    Oxygen Plan  Goal Status:  "Initial Assessment; goals not yet started  Oxygen Goals: Decrease MMRC score, Learn and use diaphragmatic breathing as needed, Learn and use pursed lip breathing as needed, and Titrate supplemental O2 as needed to keep SpO2 at 88% or greater    Oxygen Education/Interventions:   To be done in Pulmonary Rehab.    Nutrition Assessment:    Hyperlipidemia: No     Lipids:   Lab Results   Component Value Date    CHOL 142 11/25/2024    HDL 49.0 11/25/2024    LDLF 59 08/11/2023    TRIG 130 11/25/2024       Current Dietary Guidelines: No restrictions  Barriers to dietary change: no    Diet Habit Survey: Picture Your Plate  Pre: 56  Post: To be done at discharge.    Diabetes Assessment    Lab Results   Component Value Date    HGBA1C 6.4 (H) 04/23/2025       History of Diabetes: Yes Pt monitors BS at home: No   Frequency: Pt does not check his sugars. Did advise patient that he would need to do so prior to exercise.  Hypoglycemic Episodes: No     Weight Management    Height: 177.8 cm (5' 10\")  Weight: 113 kg (250 lb)  BMI (Calculated): 35.87  No data recorded    Nutrition Plan    Goal Status: Initial Assessment; goals not yet started    Nutrition Goals: Lose 1lb/week while enrolled in program    Nutrition Interventions/Education:   To be done in pulmonary rehab     Initial Assessment: NA    Exercise Assessment    No  Mode: NA  Frequency: NA  Duration: NA    6 Minute Walk Assessment     6 MWT distance:  Distance Walked(ft): 750   FiO2 used during test: 4 Liters    Exercise Prescription      Exercise Prescription based on: 6 Minute Walk Test Ravin Camacho walked with our oxygen starting on 4LNC. Pt did require 8LNC due to pulse ox reading 85%. Pt also required 2 stops totaling 90 seconds    Pre Test O2 Sat/HR: 94/89  1 Minute O2 Sat/HR: 85/114 (160 ft - Increased pt from 6 to 8LNC)  2 Minute O2 Sat/HR: 94/106 (140 ft)  3 Minute O2 Sat/HR: 90/104 (150 ft)  4 Minute O2 Sat/HR: 88/112 (150 ft)  5 Minute O2 Sat/HR: 88/114 " (Stopped for full minute)  6 Minute O2 Sat/HR: 94/104 (150 ft)  Distance Walked(ft): 750  Chayo Dyspnea: 4 (RPE 12)  Chayo PRE: 0     Frequency:  2 days/week   Mode: Treadmill, NuStep, and Recumbent Cycle   Duration: 30-15 total aerobic minutes   Intensity: RPE 10-12  Target HR:    MET Level: 1.1-2.0  Patient wears supplemental O2: Yes;   O2 Flow Rate: 3 to 4 L/min  SpO2 Range: 88 to 100 %     Modality Workload METs Duration (minutes)   1 Pre-Exercise      2 Treadmill 1.0  1.8 5-10 :00   3 NuStep 1  2.0 5-10 :00   4 Recumbent Bike 1  1.1-1.3 5-10 :00   5       6 Post-Exercise        Resistance Training: No   Home Exercise Prescription given: To be given prior to discharge from program.    Exercise Plan    Goal Status: Initial Assessment; goals not yet started    Exercise Goals: Increase total exercise duration to 30-45 minutes, Obtain 150 minutes/week of moderate intensity aerobic exercise, and Establish a home exercise program before discharge    Exercise Interventions/Education:   To be done in pulmonary rehab     Initial Assessment: NA    Other Core Components/Risk Factor Assessment:    Medication adherence  Current Medications:   Medication Documentation Review Audit       Reviewed by Jose Antonio Prado MD (Physician) on 06/01/25 at 2209      Medication Order Taking? Sig Documenting Provider Last Dose Status   albuterol 2.5 mg /3 mL (0.083 %) nebulizer solution [id number] Yes TAKE 3 ML (2.5 MG) BY NEBULIZATION EVERY 6 HOURS IF NEEDED FOR WHEEZING OR SHORTNESS OF BREATH. DANIELLA Maldonado  Active   albuterol 90 mcg/actuation inhaler [id number] Yes INHALE 2 PUFFS BY MOUTH EVERY 4 TO 6 HOURS AS NEEDED DANIELLA Maldonado 11/11/2024 Morning Active   atorvastatin (Lipitor) 20 mg tablet [id number] Yes TAKE 1 TABLET BY MOUTH EVERY DAY Jose Antonio Prado MD  Active   blood sugar diagnostic (Blood Glucose Test) strip [id number] Yes 1 strip by in vitro route once daily. Historical Provider, MD Past Week  Active   dietary supplement capsule [id number] Yes Take 3 capsules by mouth. SPIRULINA Oscar Hernandez MD Past Week Active   dietary supplement capsule [id number] Yes Take 1 capsule by mouth once daily. ANTOINETTE Hernandez MD Past Week Active   Patient not taking:  Discontinued 05/28/25 1328   ergocalciferol (Vitamin D-2) 1.25 MG (45792 UT) capsule [id number] Yes Take 1 capsule (1,250 mcg) by mouth 1 (one) time per week. SATURDAY Jose Antonio Prado MD Past Week Active   fluticasone-umeclidin-vilanter (Trelegy Ellipta) 200-62.5-25 mcg blister with device [id number] Yes Inhale 1 puff once daily in the morning. Take before meals. Jose Antonio Prado MD  Active      Patient not taking:   Discontinued 05/28/25 1344   Jardiance 10 mg tablet [id number] Yes TAKE 1 TABLET BY MOUTH EVERY DAY Jose Antonio Prado MD  Active   lancets (Fingerstix Lancets) misc [id number] Yes once daily. Oscar Hernandez MD Past Week Active   losartan (Cozaar) 25 mg tablet [id number] Yes TAKE 1 TABLET BY MOUTH EVERY DAY Jose Antonio Prado MD  Active   meloxicam (Mobic) 15 mg tablet [id number] Yes TAKE 1 TABLET EVERY DAY AS NEEDED Jose Antonio Prado MD  Active   metFORMIN  mg 24 hr tablet [id number] Yes Take 2 tablets (1,000 mg) by mouth 2 times a day. Jose Antonio Prado MD  Active   multivitamin with minerals (multivitamin with folic acid) tablet [id number] Yes Take 1 tablet by mouth once daily. Oscar Hernandez MD Past Week Active   oxygen (O2) gas therapy [id number] Yes Inhale 1 each continuously. Oscar Hernandez MD  Active      Patient not taking:   Discontinued 05/28/25 1344    Discontinued 05/28/25 1335                                 Medication compliance: Yes   Uses pill box/organizer: No    Carries medication list: Yes    Uses Inhalers appropriately: Yes     Blood Pressure Management  History of Hypertension: Yes   Medication Changes: No   Resting BP:  Visit Vitals  /60        Heart Failure Management  Hx of Heart Failure:  No    Smoking/Tobacco Assessment  Tobacco Use History[1]    Other Core Component Plan    Goal Status: Initial Assessment; goals not yet started    Other Core Component Goals: Stress management skills    Other Core Component Interventions/Education: NA      Initial Assessment: NA    Individual Patient Goals:    1.) Any kind of improvement      Goal Status: Initial Assessment; goals not yet started    Staff Comments:  Initial Assessment     Rehab Staff Signature: ALDA MURILLO RRT             [1]   Social History  Tobacco Use   Smoking Status Former    Current packs/day: 0.00    Types: Cigarettes    Quit date:     Years since quittin.4    Passive exposure: Past   Smokeless Tobacco Never

## 2025-06-24 ENCOUNTER — APPOINTMENT (OUTPATIENT)
Dept: OPHTHALMOLOGY | Facility: CLINIC | Age: 70
End: 2025-06-24
Payer: MEDICARE

## 2025-06-24 DIAGNOSIS — H25.813 COMBINED FORM OF AGE-RELATED CATARACT, BOTH EYES: Primary | ICD-10-CM

## 2025-06-24 PROCEDURE — 99213 OFFICE O/P EST LOW 20 MIN: CPT | Performed by: OPHTHALMOLOGY

## 2025-06-24 ASSESSMENT — VISUAL ACUITY
CORRECTION_TYPE: GLASSES
OS_BAT_MED: 20/30
OD_CC: 20/30+1
OS_CC: 20/30-2
METHOD: SNELLEN - LINEAR
OD_BAT_MED: 20/30-2

## 2025-06-24 ASSESSMENT — SLIT LAMP EXAM - LIDS
COMMENTS: 1+ MGD, DERMATOCHALASIS
COMMENTS: 1+ MGD, DERMATOCHALASIS

## 2025-06-24 ASSESSMENT — REFRACTION_WEARINGRX
OS_SPHERE: +2.25
OS_CYLINDER: SPHERE
OD_SPHERE: +2.50
OD_AXIS: 090
OD_ADD: +2.50
OD_CYLINDER: -0.25
OS_ADD: +2.50

## 2025-06-24 ASSESSMENT — CUP TO DISC RATIO
OD_RATIO: .2, NO NVD
OS_RATIO: .2, NO NVD

## 2025-06-24 ASSESSMENT — TONOMETRY
OD_IOP_MMHG: 14
OS_IOP_MMHG: 13
IOP_METHOD: GOLDMANN APPLANATION

## 2025-06-24 ASSESSMENT — EXTERNAL EXAM - LEFT EYE: OS_EXAM: NORMAL

## 2025-06-24 ASSESSMENT — EXTERNAL EXAM - RIGHT EYE: OD_EXAM: NORMAL

## 2025-06-24 NOTE — PROGRESS NOTES
Assessment/Plan   Diagnoses and all orders for this visit:  Combined form of age-related cataract, both eyes    Nearing Visual significance, patient is not driving at night and tolerating for now.    Plan:  ATs  See in 1 year for BAT and DFE.

## 2025-06-25 ENCOUNTER — TELEMEDICINE (OUTPATIENT)
Dept: UROLOGY | Facility: HOSPITAL | Age: 70
End: 2025-06-25
Payer: MEDICARE

## 2025-06-25 DIAGNOSIS — R35.0 BENIGN PROSTATIC HYPERPLASIA WITH URINARY FREQUENCY: Primary | ICD-10-CM

## 2025-06-25 DIAGNOSIS — N40.1 BENIGN PROSTATIC HYPERPLASIA WITH URINARY FREQUENCY: Primary | ICD-10-CM

## 2025-06-25 DIAGNOSIS — R35.1 NOCTURIA: ICD-10-CM

## 2025-06-25 PROCEDURE — 1036F TOBACCO NON-USER: CPT | Performed by: UROLOGY

## 2025-06-25 PROCEDURE — 4010F ACE/ARB THERAPY RXD/TAKEN: CPT | Performed by: UROLOGY

## 2025-06-25 PROCEDURE — 99213 OFFICE O/P EST LOW 20 MIN: CPT | Performed by: UROLOGY

## 2025-07-01 ENCOUNTER — CLINICAL SUPPORT (OUTPATIENT)
Dept: CARDIAC REHAB | Facility: CLINIC | Age: 70
End: 2025-07-01
Payer: MEDICARE

## 2025-07-01 DIAGNOSIS — J44.9 CHRONIC OBSTRUCTIVE PULMONARY DISEASE, UNSPECIFIED COPD TYPE (MULTI): ICD-10-CM

## 2025-07-01 PROCEDURE — 94626 PHY/QHP OP PULM RHB W/MNTR: CPT | Performed by: INTERNAL MEDICINE

## 2025-07-03 ENCOUNTER — CLINICAL SUPPORT (OUTPATIENT)
Dept: CARDIAC REHAB | Facility: CLINIC | Age: 70
End: 2025-07-03
Payer: MEDICARE

## 2025-07-03 DIAGNOSIS — J44.9 CHRONIC OBSTRUCTIVE PULMONARY DISEASE, UNSPECIFIED COPD TYPE (MULTI): ICD-10-CM

## 2025-07-03 PROCEDURE — 94626 PHY/QHP OP PULM RHB W/MNTR: CPT | Performed by: INTERNAL MEDICINE

## 2025-07-08 ENCOUNTER — CLINICAL SUPPORT (OUTPATIENT)
Dept: CARDIAC REHAB | Facility: CLINIC | Age: 70
End: 2025-07-08
Payer: MEDICARE

## 2025-07-08 DIAGNOSIS — J44.9 CHRONIC OBSTRUCTIVE PULMONARY DISEASE, UNSPECIFIED COPD TYPE (MULTI): ICD-10-CM

## 2025-07-08 PROCEDURE — 94626 PHY/QHP OP PULM RHB W/MNTR: CPT | Performed by: INTERNAL MEDICINE

## 2025-07-10 ENCOUNTER — APPOINTMENT (OUTPATIENT)
Dept: UROLOGY | Facility: HOSPITAL | Age: 70
End: 2025-07-10
Payer: MEDICARE

## 2025-07-10 ENCOUNTER — CLINICAL SUPPORT (OUTPATIENT)
Dept: CARDIAC REHAB | Facility: CLINIC | Age: 70
End: 2025-07-10
Payer: MEDICARE

## 2025-07-10 DIAGNOSIS — J44.9 CHRONIC OBSTRUCTIVE PULMONARY DISEASE, UNSPECIFIED COPD TYPE (MULTI): ICD-10-CM

## 2025-07-10 PROCEDURE — 94626 PHY/QHP OP PULM RHB W/MNTR: CPT | Performed by: INTERNAL MEDICINE

## 2025-07-12 DIAGNOSIS — E55.9 VITAMIN D DEFICIENCY, UNSPECIFIED: ICD-10-CM

## 2025-07-14 RX ORDER — ERGOCALCIFEROL 1.25 MG/1
1.25 CAPSULE ORAL WEEKLY
Qty: 12 CAPSULE | Refills: 2 | Status: SHIPPED | OUTPATIENT
Start: 2025-07-14

## 2025-07-15 ENCOUNTER — CLINICAL SUPPORT (OUTPATIENT)
Dept: CARDIAC REHAB | Facility: CLINIC | Age: 70
End: 2025-07-15
Payer: MEDICARE

## 2025-07-15 DIAGNOSIS — J44.9 CHRONIC OBSTRUCTIVE PULMONARY DISEASE, UNSPECIFIED COPD TYPE (MULTI): ICD-10-CM

## 2025-07-15 PROCEDURE — 94626 PHY/QHP OP PULM RHB W/MNTR: CPT | Performed by: INTERNAL MEDICINE

## 2025-07-17 ENCOUNTER — CLINICAL SUPPORT (OUTPATIENT)
Dept: CARDIAC REHAB | Facility: CLINIC | Age: 70
End: 2025-07-17
Payer: MEDICARE

## 2025-07-17 DIAGNOSIS — J44.9 CHRONIC OBSTRUCTIVE PULMONARY DISEASE, UNSPECIFIED COPD TYPE (MULTI): ICD-10-CM

## 2025-07-17 PROCEDURE — 94626 PHY/QHP OP PULM RHB W/MNTR: CPT | Performed by: INTERNAL MEDICINE

## 2025-07-17 NOTE — PROGRESS NOTES
Pulmonary Rehabilitation 30 Day Reassessment    Name: Ravin Camacho  Medical Record Number: [id number]  YOB: 1955  Age: 70 y.o.    Today’s Date: 7/17/2025  Primary Care Physician: Jose Antonio Prado MD  Referring Physician: Trell Sanderson MD  Program Location: [address]     General  Primary Diagnosis: J44.9   Onset/Date of Diagnosis: 4/5/23    In Progress      Falls Risk: High  Psychosocial Assessment    No data recorded      Sent PH-Q 9 to MD if score > 20:   PRE-PHQ-9- 6  POST-PHQ-9-    Stress Management    Pt reported/currently experiencing stress: No  Patient uses stress management skills: Pt does not have a stress management skills  History of: no history of anxiety or depression  Currently seeing a mental health provider: No  Social Support: Yes, Whom:Yes  Pre CAT score: 26  Post CAT score: Survey to be given at discharge.   Learning Assessment:  Learning assessment/barriers: None  Preferred learning method: Reading handout  Barriers: None  Comments:    Stages of Change:Action    Psychosocial Plan    Goal Status: In progress    Psychosocial Goals: Demonstrating proper techniques for stress management and Maintain or lower PH-Q 9 score by discharge    Psychosocial Interventions/Education: To be done in pulmonary rehab      Psychosocial Reassessment: To be completed at discharge    Oxygen Assessment    SpO2 at rest: 96%  SpO2 with exertion: 92%    Oxygen Use    Supplemental O2 prescribed: Yes,   Liters at rest: 3-4 L  Liters with exertion: 4 L    SpO2 at rest: 96%  SpO2 with exertion: 92%    Using O2 as prescribed: Yes       Demonstrates appropriate knowledge of O2 use: Yes   Demonstrates appropriate knowledge of O2 safety: Yes     Home O2 System: Concentrator  Portable O2 System: Portable Oxygen Concentrator    Hypoxia managed: Yes   Home Pulse Oximeter: Yes     Pre MMRC: 4  Post MMRC: To be done at discharge    Oxygen Plan  Goal Status: In progress  Oxygen Goals: Decrease MMRC  score, Learn and use diaphragmatic breathing as needed, Learn and use pursed lip breathing as needed, and Titrate supplemental O2 as needed to keep SpO2 at 88% or greater    Oxygen Education/Interventions:   To be done in Pulmonary Rehab.    Nutrition Assessment:    Hyperlipidemia: No     Lipids:   Lab Results   Component Value Date    CHOL 142 11/25/2024    HDL 49.0 11/25/2024    LDLF 59 08/11/2023    TRIG 130 11/25/2024       Current Dietary Guidelines: No restrictions  Barriers to dietary change: no    Diet Habit Survey: Picture Your Plate  Pre: 56  Post: To be done at discharge.    Diabetes Assessment    Lab Results   Component Value Date    HGBA1C 6.4 (H) 04/23/2025       History of Diabetes: Yes Pt monitors BS at home: No   Frequency: Pt does not check his sugars. Did advise patient that he would need to do so prior to exercise.  Hypoglycemic Episodes: No     Weight Management       No data recorded    Nutrition Plan    Goal Status: In progress    Nutrition Goals: Lose 1lb/week while enrolled in program    Nutrition Interventions/Education:   To be done in pulmonary rehab     Initial Assessment: NA    Exercise Assessment    No  Mode: NA  Frequency: NA  Duration: NA    6 Minute Walk Assessment     6 MWT distance:    750 ft  FiO2 used during test: 4 Liters    Exercise Prescription      Exercise Prescription based on: 6 Minute Walk Test Ravin Camacho walked with our oxygen starting on 4LNC. Pt did require 8LNC due to pulse ox reading 85%. Pt also required 2 stops totaling 90 seconds          Frequency:  2 days/week   Mode: Treadmill, NuStep, and Recumbent Cycle   Duration: 30-15 total aerobic minutes   Intensity: RPE 10-12  Target HR:    MET Level: 1.1-2.0  Patient wears supplemental O2: Yes;   O2 Flow Rate: 3 to 4 L/min  SpO2 Range: 88 to 100 %     Modality Workload METs Duration (minutes)   1 Pre-Exercise      2 Treadmill 1.0  1.8 5   3 NuStep 1  2.0 20   4 Recumbent Bike 1  1.1-1.3 5-10   5        6 Post-Exercise        Resistance Training: No   Home Exercise Prescription given: To be given prior to discharge from program.    Exercise Plan    Goal Status: In progress    Exercise Goals: Increase total exercise duration to 30-45 minutes, Obtain 150 minutes/week of moderate intensity aerobic exercise, and Establish a home exercise program before discharge    Exercise Interventions/Education:   To be done in pulmonary rehab     Exercise Reassessment: To be completed every 30 days    Other Core Components/Risk Factor Assessment:    Medication adherence  Current Medications:   Medication Documentation Review Audit       Reviewed by EVELYN Espinosa (Technician) on 06/24/25 at 1433      Medication Order Taking? Sig Documenting Provider Last Dose Status   albuterol 2.5 mg /3 mL (0.083 %) nebulizer solution [id number]  TAKE 3 ML (2.5 MG) BY NEBULIZATION EVERY 6 HOURS IF NEEDED FOR WHEEZING OR SHORTNESS OF BREATH. DANIELLA Maldonado  Active   albuterol 90 mcg/actuation inhaler [id number] No INHALE 2 PUFFS BY MOUTH EVERY 4 TO 6 HOURS AS NEEDED DANIELLA Maldonado 11/11/2024 Morning Active   atorvastatin (Lipitor) 20 mg tablet [id number]  TAKE 1 TABLET BY MOUTH EVERY DAY Jose Antonio Prado MD  Active   blood sugar diagnostic (Blood Glucose Test) strip [id number] No 1 strip by in vitro route once daily. Historical Provider, MD Past Week Active   dietary supplement capsule [id number] No Take 3 capsules by mouth. SPIRULINA Historical Provider, MD Past Week Active   dietary supplement capsule [id number] No Take 1 capsule by mouth once daily. KRE-ALKALYN Historical Provider, MD Past Week Active   ergocalciferol (Vitamin D-2) 1.25 MG (29079 UT) capsule [id number] No Take 1 capsule (1,250 mcg) by mouth 1 (one) time per week. SATURDAY Jose Antonio Prado MD Past Week Active   fluticasone-umeclidin-vilanter (Trelegy Ellipta) 200-62.5-25 mcg blister with device [id number]  Inhale 1 puff once daily in the morning. Take  before meals. Jose Antonio Prado MD  Active   Jardiance 10 mg tablet [id number]  TAKE 1 TABLET BY MOUTH EVERY DAY Jose Antonio Prado MD  Active   lancets (Fingerstix Lancets) misc [id number] No once daily. Historical Provider, MD Past Week Active   losartan (Cozaar) 25 mg tablet [id number]  TAKE 1 TABLET BY MOUTH EVERY DAY Jose Antonio Prado MD  Active   meloxicam (Mobic) 15 mg tablet [id number]  TAKE 1 TABLET EVERY DAY AS NEEDED Jose Antonio Prado MD  Active   metFORMIN  mg 24 hr tablet [id number]  Take 2 tablets (1,000 mg) by mouth 2 times a day. Jose Antonio Prado MD  Active   multivitamin with minerals (multivitamin with folic acid) tablet [id number] No Take 1 tablet by mouth once daily. Historical Provider, MD Past Week Active   oxygen (O2) gas therapy [id number]  Inhale 1 each continuously. Historical Provider, MD  Active   tadalafil 20 mg tablet [id number]  TAKE 1 TABLET (20 MG) BY MOUTH ONCE DAILY AS NEEDED FOR ERECTILE DYSFUNCTION. Mello Frausto MD  Active                                 Medication compliance: Yes   Uses pill box/organizer: No    Carries medication list: Yes    Uses Inhalers appropriately: Yes     Blood Pressure Management  History of Hypertension: Yes   Medication Changes: No   Resting BP:  114/62       Heart Failure Management  Hx of Heart Failure: No    Smoking/Tobacco Assessment  Tobacco Use History[1]    Other Core Component Plan    Goal Status: In progress    Other Core Component Goals: Stress management skills    Other Core Component Interventions/Education: NA      Other Core Components Reassessment: NA    Individual Patient Goals:    1.) Any kind of improvement      Goal Status: In progress    Staff Comments:  Pt has been to a total of 5 sessions at pulmonary rehab. Pt has been limited to the NS and RB. Pt has been able to complete 20 minutes on the NS and 5 minutes on the RB. Pt does use 6 LNC with exercise. Will continue on working to increase patients workout time to 45 minutes.    Rehab Staff  Signature: ALDA MURILLO RRT             [1]   Social History  Tobacco Use   Smoking Status Former    Current packs/day: 0.00    Types: Cigarettes    Quit date:     Years since quittin.5    Passive exposure: Past   Smokeless Tobacco Never

## 2025-07-22 ENCOUNTER — CLINICAL SUPPORT (OUTPATIENT)
Dept: CARDIAC REHAB | Facility: CLINIC | Age: 70
End: 2025-07-22
Payer: MEDICARE

## 2025-07-22 DIAGNOSIS — J44.9 CHRONIC OBSTRUCTIVE PULMONARY DISEASE, UNSPECIFIED COPD TYPE (MULTI): ICD-10-CM

## 2025-07-22 PROCEDURE — 94626 PHY/QHP OP PULM RHB W/MNTR: CPT | Performed by: INTERNAL MEDICINE

## 2025-07-23 ENCOUNTER — APPOINTMENT (OUTPATIENT)
Dept: PULMONOLOGY | Facility: HOSPITAL | Age: 70
End: 2025-07-23
Payer: MEDICARE

## 2025-07-24 ENCOUNTER — CLINICAL SUPPORT (OUTPATIENT)
Dept: CARDIAC REHAB | Facility: CLINIC | Age: 70
End: 2025-07-24
Payer: MEDICARE

## 2025-07-24 DIAGNOSIS — J44.9 CHRONIC OBSTRUCTIVE PULMONARY DISEASE, UNSPECIFIED COPD TYPE (MULTI): ICD-10-CM

## 2025-07-24 PROCEDURE — 94626 PHY/QHP OP PULM RHB W/MNTR: CPT | Performed by: INTERNAL MEDICINE

## 2025-07-29 ENCOUNTER — CLINICAL SUPPORT (OUTPATIENT)
Dept: CARDIAC REHAB | Facility: CLINIC | Age: 70
End: 2025-07-29
Payer: MEDICARE

## 2025-07-29 DIAGNOSIS — J44.9 CHRONIC OBSTRUCTIVE PULMONARY DISEASE, UNSPECIFIED COPD TYPE (MULTI): ICD-10-CM

## 2025-07-29 PROCEDURE — 94626 PHY/QHP OP PULM RHB W/MNTR: CPT | Performed by: INTERNAL MEDICINE

## 2025-07-29 NOTE — PROGRESS NOTES
Ravin Camacho  1955  [id number]  70 y.o.    [facility] [id number] CARDDAYA      Cardiac/Pulmonary Rehab Nutrition Education    7/29/2025  Gave and reviewed PYP and pulmonary nutrition handout with patient. PYP score 56. Encouraged pt to increase vegetables and decrease processed meats. Reviewed portion size of meats and plate set up and encouraged pt to decrease portions at meals to allow for balance. Encouraged pt to further review at home and follow up with questions as needed.     Signature Jane Padilla, ZANA, LD

## 2025-07-30 NOTE — PROGRESS NOTES
FUV    Last seen - 4/4/25     HISTORY OF PRESENT ILLNESS:   Ravin Camacho is a 70 y.o. male who is being seen today for continued cystoscopic surveillance.  -T1 papillary urothelial carcinoma, high grade  -Pt has completed 12 months of maintenance of Gemcitabine/Docetaxel intravesical instillations by Charleen Sutton NP, last was on 6/4/24.   - 1.4cm stone in the left kidney, managed by Dr. Ambriz   - s/p HOLEP+ Botox 100U with Dr Navarro   PAST MEDICAL HISTORY:  Past Medical History:   Diagnosis Date    Acute otitis media 04/05/2023    Bladder cancer (Multi)     S/P CHEMO COMPLETE    BPH (benign prostatic hyperplasia)     Cervical disc disease     CERVICAL RADICULITIS    Cluster headache 04/05/2023    COPD (chronic obstructive pulmonary disease) (Multi)     HOME O2 3LNC AT REST, 4LNC WITH EXERTION, DR. Echeverria TASKED FOR CLEARANCE    Diastolic dysfunction     NEWDR. 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    Lumbar disc disease     SPINAL STENOSIS    Lung cancer (Multi)     REOCCURANCE 4/2021, S/P LOBECTOMY 2015    On home O2     Personal history of irradiation 09/18/2022    History of radiation therapy    Pure hypercholesterolemia, unspecified     High cholesterol    Type 2 diabetes mellitus     A1C 5.9 8/11/23    Vitamin D deficiency     Wears dentures     LOWER     PAST SURGICAL HISTORY:  Past Surgical History:   Procedure Laterality Date    BLADDER SURGERY  3/1/2023    TURBT MARCH 2023    COLONOSCOPY  12/01/2016    Complete Colonoscopy    CYSTOSCOPY  Spring 2025    HAND SURGERY  12/01/2016    Hand Surgery                                                                                                                                                          KIDNEY STONE SURGERY  5/23/2022    May 2022    KNEE ARTHROPLASTY      right knee    LUNG LOBECTOMY  05/22/2015  "   Lung Lobectomy    OTHER SURGICAL HISTORY  10/31/2019    Knee replacement    OTHER SURGICAL HISTORY  09/18/2022    Epidural steroid injection    OTHER SURGICAL HISTORY  09/18/2022    Bronchoscopy    VASECTOMY  1997     ALLERGIES:   No Known Allergies     MEDICATIONS:   Current Outpatient Medications   Medication Instructions    albuterol 90 mcg/actuation inhaler 2 puffs, Every 4 hours PRN    albuterol 2.5 mg, nebulization, Every 6 hours PRN    atorvastatin (LIPITOR) 20 mg, oral, Daily    blood sugar diagnostic (Blood Glucose Test) strip 1 strip, Daily    dietary supplement capsule 3 capsules    dietary supplement capsule 1 capsule, Daily    ergocalciferol (VITAMIN D-2) 1.25 mg, oral, Weekly, SATURDAY    fluticasone-umeclidin-vilanter (Trelegy Ellipta) 200-62.5-25 mcg blister with device 1 puff, inhalation, Daily before breakfast    Jardiance 10 mg, oral, Daily    lancets (Fingerstix Lancets) misc Daily    losartan (Cozaar) 25 mg tablet TAKE 1 TABLET BY MOUTH EVERY DAY    meloxicam (MOBIC) 15 mg, oral, Daily PRN    metFORMIN XR (GLUCOPHAGE-XR) 1,000 mg, oral, 2 times daily    multivitamin with minerals (multivitamin with folic acid) tablet 1 tablet, Daily    oxygen (O2) gas therapy 1 each, Continuous    tadalafil 20 mg, oral, Daily PRN      PHYSICAL EXAM:  There were no vitals taken for this visit.  Constitutional: Patient appears well-developed and well-nourished. No distress.    Pulmonary/Chest: Effort normal. No respiratory distress.   Abdominal: Soft, ND NT  : WNL  Musculoskeletal: Normal range of motion.    Neurological: Alert and oriented to person, place, and time.  Psychiatric: Normal mood and affect. Behavior is normal. Thought content normal.      Labs:  Lab Results   Component Value Date    TESTOSTERONE 220 (L) 11/25/2024     Lab Results   Component Value Date    PSA 3.64 08/11/2023     No components found for: \"CBC\"  Lab Results   Component Value Date    CREATININE 1.12 04/23/2025     No components " "found for: \"TESTOTMS\"  Lab Results   Component Value Date    TESTF 64.3 10/28/2021     Imaging:  Renal ultrasound (05/20/25) personally reviewed and independently interpreted.  Multiple bilateral non-obstructing renal calculi;  Measuring up to 9 mm in the right kidney  Measuring up to 7 mm in the left kidney.    Renal US on 4/18/24 demonstrated No bladder mass. Non obstructing bilateral nephrolithiasis without hydronephrosis. 1.4cm stone in the left kidney, will continue to follow with Dr. Ambriz for treatment on this.    Renal US, 5/20/25: 1. Multiple bilateral non-obstructing renal calculi measuring up to 9  mm in the right kidney and 7 mm in the left kidney. Results reviewed with patient. Patient reassured.      Discussion:  Doing well, no complaints. Denies any dysuria, hematuria, bothersome urinary frequency, urgency, or flank pain. Hx of T1 papillary urothelial carcinoma, high grade. Underwent cystoscopy today for surveillance which was negative for recurrence; no tumors, stones, or lesions. Cytology sent. 1 abx given to patient in clinic today. Patient instructed to follow up in 3 months for continued surveillance.    Discussed his ultrasound results, which revealed multiple bilateral non-obstructing renal calculi. Measuring up to 9 mm on the right; 7 mm on the left. Patient is on chronic supplemental O2. Discussed options, and will continue monitoring these stones for now. Will plan to repeat interval imaging in 6 months to assess stone burden. If worsening, will refer to my Colleague for treatment.     Cystoscopic surveillance schedule: q3 months until 3/2026, q6 until 3/2028, and yearly after no recurrences.  Assessment:      No diagnosis found.    T1 papillary urothelial carcinoma, high grade     Ravin Camacoh is a 70 y.o. male here for FUV     Plan:   Follow up in 3 months for next surveillance cystoscope.  CT stone in 6 months to reevaluate stone burden.     All questions and concerns were " addressed. Patient verbalizes understanding and has no other questions at this time.     Scribe Attestation:  Scribed for Dr. Mello Frausto by Abdi Mcclelland.   By signing my name below, I, Paola Olvera attest that this documentation has been prepared under the direction and in the presence of Dr. Mello Frausto MD. All medical record entries made by the Scribe were at my direction or personally dictated by me. I have reviewed the chart and agree that the record accurately reflects my personal performance of the history, physical exam, discussion and plan. 07/31/25

## 2025-07-31 ENCOUNTER — APPOINTMENT (OUTPATIENT)
Dept: CARDIAC REHAB | Facility: CLINIC | Age: 70
End: 2025-07-31
Payer: MEDICARE

## 2025-07-31 ENCOUNTER — PROCEDURE VISIT (OUTPATIENT)
Dept: UROLOGY | Facility: HOSPITAL | Age: 70
End: 2025-07-31
Payer: MEDICARE

## 2025-07-31 VITALS — HEART RATE: 80 BPM | DIASTOLIC BLOOD PRESSURE: 79 MMHG | SYSTOLIC BLOOD PRESSURE: 159 MMHG

## 2025-07-31 DIAGNOSIS — R39.9 LOWER URINARY TRACT SYMPTOMS (LUTS): ICD-10-CM

## 2025-07-31 DIAGNOSIS — C67.9 MALIGNANT NEOPLASM OF URINARY BLADDER, UNSPECIFIED SITE (MULTI): ICD-10-CM

## 2025-07-31 PROCEDURE — 52000 CYSTOURETHROSCOPY: CPT | Performed by: UROLOGY

## 2025-07-31 PROCEDURE — 2500000001 HC RX 250 WO HCPCS SELF ADMINISTERED DRUGS (ALT 637 FOR MEDICARE OP): Performed by: UROLOGY

## 2025-07-31 PROCEDURE — 81003 URINALYSIS AUTO W/O SCOPE: CPT | Mod: QW | Performed by: UROLOGY

## 2025-07-31 RX ORDER — CIPROFLOXACIN 500 MG/1
500 TABLET, FILM COATED ORAL ONCE
Status: COMPLETED | OUTPATIENT
Start: 2025-07-31 | End: 2025-07-31

## 2025-07-31 RX ADMIN — CIPROFLOXACIN 500 MG: 500 TABLET ORAL at 14:43

## 2025-08-02 DIAGNOSIS — E11.9 DIABETES MELLITUS TYPE 2 WITHOUT RETINOPATHY (MULTI): ICD-10-CM

## 2025-08-04 RX ORDER — EMPAGLIFLOZIN 10 MG/1
10 TABLET, FILM COATED ORAL DAILY
Qty: 30 TABLET | Refills: 2 | Status: SHIPPED | OUTPATIENT
Start: 2025-08-04

## 2025-08-05 ENCOUNTER — CLINICAL SUPPORT (OUTPATIENT)
Dept: CARDIAC REHAB | Facility: CLINIC | Age: 70
End: 2025-08-05
Payer: MEDICARE

## 2025-08-05 DIAGNOSIS — J44.9 CHRONIC OBSTRUCTIVE PULMONARY DISEASE, UNSPECIFIED COPD TYPE (MULTI): ICD-10-CM

## 2025-08-05 PROCEDURE — 94626 PHY/QHP OP PULM RHB W/MNTR: CPT | Performed by: INTERNAL MEDICINE

## 2025-08-07 ENCOUNTER — CLINICAL SUPPORT (OUTPATIENT)
Dept: CARDIAC REHAB | Facility: CLINIC | Age: 70
End: 2025-08-07
Payer: MEDICARE

## 2025-08-07 DIAGNOSIS — J44.9 CHRONIC OBSTRUCTIVE PULMONARY DISEASE, UNSPECIFIED COPD TYPE (MULTI): ICD-10-CM

## 2025-08-07 PROCEDURE — 94626 PHY/QHP OP PULM RHB W/MNTR: CPT | Performed by: INTERNAL MEDICINE

## 2025-08-08 NOTE — PROGRESS NOTES
Pulmonary Rehabilitation 60 Day Reassessment    Name: Ravin Camacho  Medical Record Number: [id number]  YOB: 1955  Age: 70 y.o.    Today’s Date: 8/8/2025  Primary Care Physician: Jose Antonio Prado MD  Referring Physician: Trell Sanderson MD  Program Location: [address]     General  Primary Diagnosis: J44.9   Onset/Date of Diagnosis: 4/5/23    In Progress      Falls Risk: High  Psychosocial Assessment    No data recorded      Sent PH-Q 9 to MD if score > 20:   PRE-PHQ-9- 6  POST-PHQ-9-    Stress Management    Pt reported/currently experiencing stress: No  Patient uses stress management skills: Pt does not have a stress management skills  History of: no history of anxiety or depression  Currently seeing a mental health provider: No  Social Support: Yes, Whom:Yes  Pre CAT score: 26  Post CAT score: Survey to be given at discharge.   Learning Assessment:  Learning assessment/barriers: None  Preferred learning method: Reading handout  Barriers: None  Comments:    Stages of Change:Action    Psychosocial Plan    Goal Status: In progress    Psychosocial Goals: Demonstrating proper techniques for stress management and Maintain or lower PH-Q 9 score by discharge    Psychosocial Interventions/Education: To be done in pulmonary rehab      Psychosocial Reassessment: To be completed at discharge    Oxygen Assessment    SpO2 at rest: 96%  SpO2 with exertion: 92%    Oxygen Use    Supplemental O2 prescribed: Yes,   Liters at rest: 3-4 L  Liters with exertion: 4-6L    SpO2 at rest: 96%  SpO2 with exertion: 92%    Using O2 as prescribed: Yes       Demonstrates appropriate knowledge of O2 use: Yes   Demonstrates appropriate knowledge of O2 safety: Yes     Home O2 System: Concentrator  Portable O2 System: Portable Oxygen Concentrator    Hypoxia managed: Yes   Home Pulse Oximeter: Yes     Pre MMRC: 4  Post MMRC: To be done at discharge    Oxygen Plan  Goal Status: In progress  Oxygen Goals: Decrease MMRC  score, Learn and use diaphragmatic breathing as needed, Learn and use pursed lip breathing as needed, and Titrate supplemental O2 as needed to keep SpO2 at 88% or greater    Oxygen Education/Interventions:   To be done in Pulmonary Rehab.    Nutrition Assessment:    Hyperlipidemia: No     Lipids:   Lab Results   Component Value Date    CHOL 142 11/25/2024    HDL 49.0 11/25/2024    LDLF 59 08/11/2023    TRIG 130 11/25/2024       Current Dietary Guidelines: No restrictions  Barriers to dietary change: no    Diet Habit Survey: Picture Your Plate  Pre: 56  Post: To be done at discharge.    Diabetes Assessment    Lab Results   Component Value Date    HGBA1C 6.4 (H) 04/23/2025       History of Diabetes: Yes Pt monitors BS at home: No   Frequency: Pt does not check his sugars. Did advise patient that he would need to do so prior to exercise.  Hypoglycemic Episodes: No     Weight Management       No data recorded    Nutrition Plan    Goal Status: In progress    Nutrition Goals: Lose 1lb/week while enrolled in program    Nutrition Interventions/Education:   7/29/2025  Gave and reviewed PYP and pulmonary nutrition handout with patient. PYP score 56. Encouraged pt to increase vegetables and decrease processed meats. Reviewed portion size of meats and plate set up and encouraged pt to decrease portions at meals to allow for balance. Encouraged pt to further review at home and follow up with questions as needed.      Signature Jane Padilla RDN, LD    Exercise Assessment    No  Mode: NA  Frequency: NA  Duration: NA    6 Minute Walk Assessment     6 MWT distance:    750 ft  FiO2 used during test: 4 Liters    Exercise Prescription      Exercise Prescription based on: 6 Minute Walk Test Ravin Camacho walked with our oxygen starting on 4LNC. Pt did require 8LNC due to pulse ox reading 85%. Pt also required 2 stops totaling 90 seconds          Frequency:  2 days/week   Mode: Treadmill, NuStep, and Recumbent  Cycle   Duration: 30-15 total aerobic minutes   Intensity: RPE 10-12  Target HR:    MET Level: 1.1-2.0  Patient wears supplemental O2: Yes;   O2 Flow Rate: 3 to 4 L/min  SpO2 Range: 88 to 100 %     Modality Workload METs Duration (minutes)   1 Pre-Exercise      2 Treadmill       3 NuStep 2 2.4 40-45   4 Recumbent Bike       5       6 Post-Exercise        Resistance Training: No   Home Exercise Prescription given: To be given prior to discharge from program.    Exercise Plan    Goal Status: In progress    Exercise Goals: Increase total exercise duration to 30-45 minutes, Obtain 150 minutes/week of moderate intensity aerobic exercise, and Establish a home exercise program before discharge    Exercise Interventions/Education:   To be done in pulmonary rehab     Exercise Reassessment: To be completed every 30 days    Other Core Components/Risk Factor Assessment:    Medication adherence  Current Medications:   Medication Documentation Review Audit       Reviewed by Sara Issa MA (Medical Assistant) on 07/31/25 at 1423      Medication Order Taking? Sig Documenting Provider Last Dose Status   albuterol 2.5 mg /3 mL (0.083 %) nebulizer solution [id number]  TAKE 3 ML (2.5 MG) BY NEBULIZATION EVERY 6 HOURS IF NEEDED FOR WHEEZING OR SHORTNESS OF BREATH. JAYME Maldonado-CNP  Active   albuterol 90 mcg/actuation inhaler [id number] No INHALE 2 PUFFS BY MOUTH EVERY 4 TO 6 HOURS AS NEEDED DANIELLA Maldonado 11/11/2024 Morning Active   atorvastatin (Lipitor) 20 mg tablet [id number]  TAKE 1 TABLET BY MOUTH EVERY DAY Jose Antonio Prado MD  Active   blood sugar diagnostic (Blood Glucose Test) strip [id number] No 1 strip by in vitro route once daily. Oscar Hernandez MD Past Week Active   ciprofloxacin (Cipro) tablet 500 mg [id number]   Mello Frausto MD  Active   dietary supplement capsule [id number] No Take 3 capsules by mouth. SPIRULINA Oscar Hernandez MD Past Week Active   dietary supplement capsule  [id number] No Take 1 capsule by mouth once daily. ANTOINETTE Historical Provider, MD Past Week Active   ergocalciferol (Vitamin D-2) 1250 mcg (50,000 units) capsule [id number]  TAKE 1 CAPSULE (1,250 MCG) BY MOUTH 1 (ONE) TIME PER WEEK. SATURDAY Jose Antonio Prado MD  Active   fluticasone-umeclidin-vilanter (Trelegy Ellipta) 200-62.5-25 mcg blister with device [id number]  Inhale 1 puff once daily in the morning. Take before meals. Jose Antonio Prado MD  Active   Jardiance 10 mg tablet [id number]  TAKE 1 TABLET BY MOUTH EVERY DAY Jose Antonio Prado MD  Active   lancets (Fingerstix Lancets) misc [id number] No once daily. Historical Provider, MD Past Week Active   losartan (Cozaar) 25 mg tablet [id number]  TAKE 1 TABLET BY MOUTH EVERY DAY Jose Antonio Prado MD  Active   meloxicam (Mobic) 15 mg tablet [id number]  TAKE 1 TABLET EVERY DAY AS NEEDED Jose Antonio Prado MD  Active   metFORMIN  mg 24 hr tablet [id number]  Take 2 tablets (1,000 mg) by mouth 2 times a day. Jose Antonio Prado MD  Active   multivitamin with minerals (multivitamin with folic acid) tablet [id number] No Take 1 tablet by mouth once daily. Historical Provider, MD Past Week Active   oxygen (O2) gas therapy [id number]  Inhale 1 each continuously. Oscar Provider, MD  Active   tadalafil 20 mg tablet [id number]  TAKE 1 TABLET (20 MG) BY MOUTH ONCE DAILY AS NEEDED FOR ERECTILE DYSFUNCTION. Mello Frausto MD  Active                                 Medication compliance: Yes   Uses pill box/organizer: No    Carries medication list: Yes    Uses Inhalers appropriately: Yes     Blood Pressure Management  History of Hypertension: Yes   Medication Changes: No   Resting BP: 126/70       Heart Failure Management  Hx of Heart Failure: No    Smoking/Tobacco Assessment  Tobacco Use History[1]    Other Core Component Plan    Goal Status: In progress    Other Core Component Goals: Stress management skills    Other Core Component Interventions/Education: NA      Other Core Components  Reassessment: NA    Individual Patient Goals:    1.) Any kind of improvement      Goal Status: In progress    Education:  ADLs- 25-handout and lecture  Breathing Techniques- 25-handout and lecture  Medical Tests -25-handout and lecture    Staff Comments:  Pt has been to a total of 10 sessions at pulmonary rehab. Pt is limited to the NS due to pain. Pt has been able to tolerate 40 minutes on the NS with a goal of 45 minutes.   Rehab Staff Signature: ALDA MURILLO RRT             [1]   Social History  Tobacco Use   Smoking Status Former    Current packs/day: 0.00    Types: Cigarettes    Quit date:     Years since quittin.6    Passive exposure: Past   Smokeless Tobacco Never

## 2025-08-12 ENCOUNTER — CLINICAL SUPPORT (OUTPATIENT)
Dept: CARDIAC REHAB | Facility: CLINIC | Age: 70
End: 2025-08-12
Payer: MEDICARE

## 2025-08-12 DIAGNOSIS — J44.9 CHRONIC OBSTRUCTIVE PULMONARY DISEASE, UNSPECIFIED COPD TYPE (MULTI): ICD-10-CM

## 2025-08-12 PROCEDURE — 94626 PHY/QHP OP PULM RHB W/MNTR: CPT | Performed by: INTERNAL MEDICINE

## 2025-08-14 ENCOUNTER — APPOINTMENT (OUTPATIENT)
Dept: CARDIAC REHAB | Facility: CLINIC | Age: 70
End: 2025-08-14
Payer: MEDICARE

## 2025-08-19 ENCOUNTER — CLINICAL SUPPORT (OUTPATIENT)
Dept: CARDIAC REHAB | Facility: CLINIC | Age: 70
End: 2025-08-19
Payer: MEDICARE

## 2025-08-19 DIAGNOSIS — J44.9 CHRONIC OBSTRUCTIVE PULMONARY DISEASE, UNSPECIFIED COPD TYPE (MULTI): ICD-10-CM

## 2025-08-19 PROCEDURE — 94626 PHY/QHP OP PULM RHB W/MNTR: CPT | Performed by: INTERNAL MEDICINE

## 2025-08-20 ENCOUNTER — HOSPITAL ENCOUNTER (OUTPATIENT)
Dept: RADIOLOGY | Facility: CLINIC | Age: 70
Discharge: HOME | End: 2025-08-20
Payer: MEDICARE

## 2025-08-20 DIAGNOSIS — C34.90 MALIGNANT NEOPLASM OF LUNG, UNSPECIFIED LATERALITY, UNSPECIFIED PART OF LUNG (MULTI): ICD-10-CM

## 2025-08-20 PROCEDURE — 71250 CT THORAX DX C-: CPT

## 2025-08-20 PROCEDURE — 71250 CT THORAX DX C-: CPT | Performed by: RADIOLOGY

## 2025-08-21 ENCOUNTER — CLINICAL SUPPORT (OUTPATIENT)
Dept: CARDIAC REHAB | Facility: CLINIC | Age: 70
End: 2025-08-21
Payer: MEDICARE

## 2025-08-21 DIAGNOSIS — J44.9 CHRONIC OBSTRUCTIVE PULMONARY DISEASE, UNSPECIFIED COPD TYPE (MULTI): ICD-10-CM

## 2025-08-21 PROCEDURE — 94626 PHY/QHP OP PULM RHB W/MNTR: CPT | Performed by: INTERNAL MEDICINE

## 2025-08-25 ENCOUNTER — TELEPHONE (OUTPATIENT)
Dept: RADIATION ONCOLOGY | Facility: HOSPITAL | Age: 70
End: 2025-08-25
Payer: MEDICARE

## 2025-08-26 ENCOUNTER — CLINICAL SUPPORT (OUTPATIENT)
Dept: CARDIAC REHAB | Facility: CLINIC | Age: 70
End: 2025-08-26
Payer: MEDICARE

## 2025-08-26 ENCOUNTER — HOSPITAL ENCOUNTER (OUTPATIENT)
Dept: RADIATION ONCOLOGY | Facility: HOSPITAL | Age: 70
Setting detail: RADIATION/ONCOLOGY SERIES
Discharge: HOME | End: 2025-08-26
Payer: MEDICARE

## 2025-08-26 DIAGNOSIS — J44.9 CHRONIC OBSTRUCTIVE PULMONARY DISEASE, UNSPECIFIED COPD TYPE (MULTI): ICD-10-CM

## 2025-08-26 DIAGNOSIS — C34.90 MALIGNANT NEOPLASM OF LUNG, UNSPECIFIED LATERALITY, UNSPECIFIED PART OF LUNG (MULTI): Primary | ICD-10-CM

## 2025-08-26 PROCEDURE — 99214 OFFICE O/P EST MOD 30 MIN: CPT | Mod: 95 | Performed by: NURSE PRACTITIONER

## 2025-08-26 PROCEDURE — 99214 OFFICE O/P EST MOD 30 MIN: CPT | Performed by: NURSE PRACTITIONER

## 2025-08-26 ASSESSMENT — ENCOUNTER SYMPTOMS
HEMATOLOGIC/LYMPHATIC NEGATIVE: 1
WHEEZING: 0
SHORTNESS OF BREATH: 1
COUGH: 0
CHEST TIGHTNESS: 0
STRIDOR: 0
DIAPHORESIS: 0
MUSCULOSKELETAL NEGATIVE: 1
GASTROINTESTINAL NEGATIVE: 1
HEMATURIA: 0
CHILLS: 0
DIFFICULTY URINATING: 0
DYSURIA: 0
FLANK PAIN: 0
UNEXPECTED WEIGHT CHANGE: 0
NEUROLOGICAL NEGATIVE: 1
ACTIVITY CHANGE: 0
APPETITE CHANGE: 0
CHOKING: 0
CARDIOVASCULAR NEGATIVE: 1
FATIGUE: 0
FEVER: 0
PSYCHIATRIC NEGATIVE: 1
APNEA: 0

## 2025-08-27 ENCOUNTER — APPOINTMENT (OUTPATIENT)
Dept: PULMONOLOGY | Facility: HOSPITAL | Age: 70
End: 2025-08-27
Payer: MEDICARE

## 2025-08-28 ENCOUNTER — CLINICAL SUPPORT (OUTPATIENT)
Dept: CARDIAC REHAB | Facility: CLINIC | Age: 70
End: 2025-08-28
Payer: MEDICARE

## 2025-08-28 DIAGNOSIS — J44.9 CHRONIC OBSTRUCTIVE PULMONARY DISEASE, UNSPECIFIED COPD TYPE (MULTI): ICD-10-CM

## 2025-08-28 PROCEDURE — 94626 PHY/QHP OP PULM RHB W/MNTR: CPT | Performed by: INTERNAL MEDICINE

## 2025-09-02 ENCOUNTER — CLINICAL SUPPORT (OUTPATIENT)
Dept: CARDIAC REHAB | Facility: CLINIC | Age: 70
End: 2025-09-02
Payer: MEDICARE

## 2025-09-02 DIAGNOSIS — J44.9 CHRONIC OBSTRUCTIVE PULMONARY DISEASE, UNSPECIFIED COPD TYPE (MULTI): ICD-10-CM

## 2025-09-02 PROCEDURE — 94626 PHY/QHP OP PULM RHB W/MNTR: CPT | Performed by: INTERNAL MEDICINE

## 2025-09-04 ENCOUNTER — CLINICAL SUPPORT (OUTPATIENT)
Dept: CARDIAC REHAB | Facility: CLINIC | Age: 70
End: 2025-09-04
Payer: MEDICARE

## 2025-09-04 DIAGNOSIS — J44.9 CHRONIC OBSTRUCTIVE PULMONARY DISEASE, UNSPECIFIED COPD TYPE (MULTI): ICD-10-CM

## 2025-09-04 PROCEDURE — 94626 PHY/QHP OP PULM RHB W/MNTR: CPT | Performed by: INTERNAL MEDICINE

## 2025-11-11 ENCOUNTER — APPOINTMENT (OUTPATIENT)
Dept: PRIMARY CARE | Facility: CLINIC | Age: 70
End: 2025-11-11
Payer: MEDICARE

## 2025-11-14 ENCOUNTER — APPOINTMENT (OUTPATIENT)
Dept: UROLOGY | Facility: HOSPITAL | Age: 70
End: 2025-11-14
Payer: MEDICARE

## 2025-12-03 ENCOUNTER — APPOINTMENT (OUTPATIENT)
Dept: PHARMACY | Facility: HOSPITAL | Age: 70
End: 2025-12-03
Payer: MEDICARE

## 2026-04-28 ENCOUNTER — APPOINTMENT (OUTPATIENT)
Dept: OPHTHALMOLOGY | Facility: CLINIC | Age: 71
End: 2026-04-28
Payer: MEDICARE

## 2026-05-13 ENCOUNTER — APPOINTMENT (OUTPATIENT)
Dept: OPHTHALMOLOGY | Facility: CLINIC | Age: 71
End: 2026-05-13
Payer: MEDICARE

## (undated) DEVICE — BLADE, ROTATION MORCELLATOR, 4.8MM X 385MM, PIRHANA, DISPOSABLE

## (undated) DEVICE — NEEDLE, HYPODERMIC, MONOJECT, 18 G X 1.5 IN

## (undated) DEVICE — SYRINGE, 50 CC, LUER LOCK

## (undated) DEVICE — TUBING, MORCELLATOR PUMP, DISPOSABLE

## (undated) DEVICE — CATHETER, LASER URETERAL, 7.1FR, 40CM

## (undated) DEVICE — TOWEL, SURGICAL, NEURO, O/R, 16 X 26, BLUE, STERILE

## (undated) DEVICE — CATHETER, URETHRAL, FOLEY, 3 WAY, BARDEX IC, 22 FR, 30 CC, SILVER LATEX

## (undated) DEVICE — BAG, DRAINAGE, ANTI-REFLUX CHAMBER, 2000ML

## (undated) DEVICE — SYRINGE, 10 CC, LUER LOCK

## (undated) DEVICE — SYRINGE, 60 CC, IRRIGATION, PISTON, CATH TIP, W/LUER ADAPTER,DISP

## (undated) DEVICE — TUBING, SUCTION, CONNECTING, STERILE 0.25 X 120 IN., LF

## (undated) DEVICE — Device

## (undated) DEVICE — TUBING, SUCTION, CONNECTING, NON-CONDUCTIVE, SURE GRIP CONNECTORS, 3/16 IN X 10 FT

## (undated) DEVICE — NEEDLE, INJETAK ADJUSTABLE TIP

## (undated) DEVICE — TOWEL PACK, STERILE, 4/PACK, BLUE

## (undated) DEVICE — PLUG, CATHETER

## (undated) DEVICE — IRRIGATION SET, CYSTOSCOPY, TURP, Y, CONTINUOUS, 81 IN

## (undated) DEVICE — COLLECTION BAG, FLUID, NON-STERILE